# Patient Record
Sex: FEMALE | Race: WHITE | Employment: UNEMPLOYED | RURAL
[De-identification: names, ages, dates, MRNs, and addresses within clinical notes are randomized per-mention and may not be internally consistent; named-entity substitution may affect disease eponyms.]

---

## 2017-10-16 ENCOUNTER — HOSPITAL ENCOUNTER (OUTPATIENT)
Dept: PHYSICAL THERAPY | Age: 47
Discharge: HOME | End: 2017-10-31
Attending: NURSE PRACTITIONER | Admitting: NURSE PRACTITIONER

## 2017-10-16 DIAGNOSIS — M25.462 EFFUSION OF LEFT KNEE: ICD-10-CM

## 2017-10-16 ASSESSMENT — PAIN SCALES - GENERAL: PAINLEVEL_OUTOF10: 6

## 2017-10-16 ASSESSMENT — PAIN DESCRIPTION - LOCATION: LOCATION: KNEE

## 2017-10-16 ASSESSMENT — PAIN DESCRIPTION - DESCRIPTORS: DESCRIPTORS: PRESSURE

## 2017-10-16 ASSESSMENT — PAIN DESCRIPTION - ORIENTATION: ORIENTATION: LEFT

## 2017-10-16 NOTE — PROGRESS NOTES
Physical Therapy  Initial Assessment  Date: 10/16/2017  Patient Name: Karishma Lanza  MRN: [id number]  : 1970     Treatment Diagnosis: L knee pain; limited ROM, weakness    Restrictions  Restrictions/Precautions  Restrictions/Precautions:  (Previous hx of MI, so monitor for s/s)    Subjective   Chart Reviewed: Yes  Patient assessed for rehabilitation services?: Yes  Referring Practitioner: Murriel Sandifer, APRN  Referral Date : 10/06/17  Diagnosis: L knee pain  PT Insurance Information: [employer]    Subjective: Patient reports she has pain in L knee. She has had a long history of knee problems bilaterally with previous surgeries. About 3 weeks ago she had a pop in her L knee. She couldn't walk for 3 days immediately following. She had a knee brace that she wore for 1 and a half weeks. She then took it off so she wouldn't become dependent on it. She works on her feet at [employer]. She feels like when she moves a certain way she gets afraid it's \"going to go. \" Walking on concrete is difficult. She has trouble getting her leg up to don shoes and socks or LBD. Pain Assessment: 0-10  Pain Level: 6 (at worst)  Pain Location: Knee  Pain Orientation: Left  Pain Descriptors: Pressure  Patient Currently in Pain: No    Vision/Hearing   WNL    Orientation  Orientation  Overall Orientation Status: Within Normal Limits    Objective  Observation/Palpation  Palpation: Tender to palpation in medial and lateral joint line, patella tendon and lateral HSs.   Observation: mild limp with ambulation     PROM RLE (degrees)  RLE General PROM: limited  AROM RLE (degrees)  R Knee Flexion 0-145: 127 deg  R Knee Extension 0: lacking 7 degrees  PROM LLE (degrees)  LLE General PROM: limited  AROM LLE (degrees)  L Knee Flexion 0-145: 115 deg  L Knee Extension 0: lacking 7 degrees    Strength RLE  R Hip Flexion: 4+/5  R Hip Extension: 4/5  R Hip ABduction: 4/5  R Knee Flexion: 5/5  R Knee Extension: 5/5  R Ankle Short term goals: 3-4 weeks   Short term goal 1: Patient will improve her score on the LEFS from 29/80 to no less than 40/80 to demonstrate improved function. Short term goal 2: Patient will improve her L knee extension strength to 4-/5 to improve her long distance ambulation. Short term goal 3: Patient will improve her L hip abduction strength to 4-/5 to improve her ability to stand for prolonged times. Short term goal 4: Patient will report no greater than 3/10 pain at rest in L knee to improve the quality of her life. Short term goal 5: Patient will improve L knee flexion AROM to 120 degrees to improve her ability to don her shoes and socks. Long term goals  Long term goal 1: Patient will improve her score on the LEFS from 29/80 to no less than 50/80 to demonstrate improved function. Long term goal 2: Patient will improve her L knee extension strength to 4/5 to improve her long distance ambulation. Long term goal 3: Patient will improve her L hip abduction strength to 4/5 to improve her ability to stand for prolonged times. Long term goal 4: Patient will report no greater than 1/10 pain at rest in L knee to improve the quality of her life. Long term goal 5: Patient will improve L knee flexion AROM to 125 degrees to improve her ability to don her shoes and socks. Therapy Time   Individual Concurrent Group Co-treatment   Time In 1700         Time Out 1741         Minutes [address] [place] [address], [facility]  License Number [id number]    Certification of Medical Necessity: It will be understood that this treatment plan is certified medically necessary by the documenting therapist and referring physician mentioned in this report. Unless the physician indicated otherwise through written correspondence with our office, all further referrals will act as certification of medical necessity on this treatment plan.       Thank you for this referral.  If you have questions regarding this plan of care, please call [phone].           Revisions to this plan (optional):                     Please sign and return this plan to:   FAX: [phone]      Signature:                                 Date:

## 2017-10-18 ENCOUNTER — HOSPITAL ENCOUNTER (OUTPATIENT)
Dept: PHYSICAL THERAPY | Age: 47
Discharge: HOME | End: 2017-10-18
Admitting: NURSE PRACTITIONER

## 2017-10-18 DIAGNOSIS — M25.462 EFFUSION OF LEFT KNEE: ICD-10-CM

## 2017-10-23 ENCOUNTER — HOSPITAL ENCOUNTER (OUTPATIENT)
Dept: PHYSICAL THERAPY | Age: 47
Discharge: HOME | End: 2017-10-23
Admitting: NURSE PRACTITIONER

## 2017-10-23 DIAGNOSIS — M25.462 EFFUSION OF LEFT KNEE: ICD-10-CM

## 2017-10-23 NOTE — FLOWSHEET NOTE
Physical Therapy Daily Treatment Note   Date:  10/23/2017    TIme In:    1602                  Time Out:     3750    Patient Name:  Pritesh Weldon    :  1970  MRN: [id number]    Restrictions/Precautions:  Previous hx of MI, so monitor for s/s  Pertinent Medical History:  Medical/Treatment Diagnosis Information:   L knee pain   Limited ROM, weakness   Insurance/Certification information:   [employer]  Physician Information:   Phineas Quivers, APRN  Plan of care signed (Y/N):    Visit# / total visits:     2    Progress Note: []  Yes  [x]  No  Next due by: Visit #10 or 11/15/17      Pain level:  7.5/10  Subjective:  Pt reports she has a lot of popping and hurts all the time. This rainy weather seems to be worsening her symptoms. She reports she had a lot of pain with the roller last visit and was bruised from her knee to mid thigh. Objective:  Observation:   Test measurements:    Palpation:    Exercises:  Exercise Resistance/Repetitions Other comments   Standing hip abd 2x12 23   Sidestepping x3 laps 23   TKE 2x15   GTB 23   Heels Slides x30 23   Clams RTB x15 23   SLR in Supine 2x12 23   Quad Sets 5\"x20 23   Nustep 8' L4 23        Heel raises  Next visit    Glut sets Next visit    HS sets Next visit      Other Therapeutic Activities:      Manual Treatments:  PROM knee flex/ext, Light STM to anterior, posterior, and lateral knee    Modalities:        Timed Code Treatment Minutes:  53      Total Treatment Minutes:  63    Treatment/Activity Tolerance:  [x] Patient tolerated treatment well [] Patient limited by fatigue  [] Patient limited by pain  [] Patient limited by other medical complications  [x] Other: Pt had difficulty with manual therapy because of tenderness to touch along IT band, anterior knee, posterior knee, and in distal quad. She has a large amount of popping in her knee and repeatedly had to take a break from manual therapy to pop her knee because it would feel locked up.   She

## 2017-10-25 ENCOUNTER — HOSPITAL ENCOUNTER (OUTPATIENT)
Dept: PHYSICAL THERAPY | Age: 47
Discharge: HOME | End: 2017-10-25
Admitting: NURSE PRACTITIONER

## 2017-10-25 DIAGNOSIS — M25.462 EFFUSION OF LEFT KNEE: ICD-10-CM

## 2017-10-25 NOTE — FLOWSHEET NOTE
Physical Therapy Daily Treatment Note   Date:  10/25/2017    TIme In:    1550                 Time Out:     6433    Patient Name:  Marisel Garner    :  1970  MRN: [id number]    Restrictions/Precautions:  Previous hx of MI, so monitor for s/s  Pertinent Medical History:  Medical/Treatment Diagnosis Information:   L knee pain   Limited ROM, weakness   Insurance/Certification information:   [employer] [facility]  Physician Information:   MARK Cheney  Plan of care signed (Y/N):    Visit# / total visits:     3    Progress Note: []  Yes  [x]  No  Next due by: Visit #10 or 11/15/17      Pain level:  8/10  Subjective:  Pt reports having increased pain since last visit. Objective:  Observation:   Test measurements:    Palpation:    Exercises:  Exercise Resistance/Repetitions Other comments   Standing hip abd 2x12 25   Sidestepping x3 laps 25   TKE 2x15   GTB 25   Heels Slides x30 25   Clams RTB x15 25   SLR in Supine 2x12 25   Quad Sets 5\"x20 25   Nustep 8' L4 25        Heel raises  Next visit    Glut sets Next visit    HS sets Next visit      Other Therapeutic Activities:      Manual Treatments:  PROM knee flex/ext, Light STM to anterior, posterior, and lateral knee    Modalities:        Timed Code Treatment Minutes:  55      Total Treatment Minutes:  65    Treatment/Activity Tolerance:  [x] Patient tolerated treatment well [] Patient limited by fatigue  [] Patient limited by pain  [] Patient limited by other medical complications  [x] Other:   Pt tolerated tx well, decreased c/o pain/soreness after session.     Pain after treatment:      7/10    Prognosis: [x] Good [] Fair  [] Poor    Patient Requires Follow-up: [x] Yes  [] No    Plan:   [x] Continue per plan of care [] Alter current plan (see comments)  [] Plan of care initiated [] Hold pending MD visit [] Discharge    Plan for Next Session:        Electronically signed by:  Deborah Suh PTA,

## 2017-10-30 ENCOUNTER — HOSPITAL ENCOUNTER (OUTPATIENT)
Dept: PHYSICAL THERAPY | Age: 47
Discharge: HOME | End: 2017-10-30
Admitting: NURSE PRACTITIONER

## 2017-10-30 DIAGNOSIS — M25.462 EFFUSION OF LEFT KNEE: ICD-10-CM

## 2017-11-01 ENCOUNTER — HOSPITAL ENCOUNTER (OUTPATIENT)
Dept: PHYSICAL THERAPY | Age: 47
Discharge: HOME | End: 2017-11-01
Admitting: NURSE PRACTITIONER

## 2017-11-01 ENCOUNTER — HOSPITAL ENCOUNTER (OUTPATIENT)
Dept: OTHER | Age: 47
Discharge: HOME | End: 2017-11-30
Attending: NURSE PRACTITIONER | Admitting: NURSE PRACTITIONER

## 2017-11-01 DIAGNOSIS — M25.462 EFFUSION OF LEFT KNEE: ICD-10-CM

## 2017-11-06 ENCOUNTER — HOSPITAL ENCOUNTER (OUTPATIENT)
Dept: PHYSICAL THERAPY | Age: 47
Discharge: HOME | End: 2017-11-06
Admitting: NURSE PRACTITIONER

## 2017-11-06 DIAGNOSIS — M25.462 EFFUSION OF LEFT KNEE: ICD-10-CM

## 2017-12-01 ENCOUNTER — HOSPITAL ENCOUNTER (OUTPATIENT)
Dept: OTHER | Age: 47
Discharge: HOME | End: 2017-12-31
Attending: NURSE PRACTITIONER | Admitting: NURSE PRACTITIONER

## 2018-07-02 ENCOUNTER — HOSPITAL ENCOUNTER (OUTPATIENT)
Dept: PHYSICAL THERAPY | Age: 48
Discharge: HOME | End: 2018-07-05
Admitting: NURSE PRACTITIONER

## 2018-07-02 DIAGNOSIS — M25.462 EFFUSION OF LEFT KNEE: ICD-10-CM

## 2018-07-02 ASSESSMENT — PAIN SCALES - GENERAL: PAINLEVEL_OUTOF10: 5

## 2018-07-02 ASSESSMENT — PAIN DESCRIPTION - LOCATION: LOCATION: NECK;SHOULDER

## 2018-07-02 ASSESSMENT — PAIN DESCRIPTION - ORIENTATION: ORIENTATION: RIGHT

## 2018-07-02 ASSESSMENT — PAIN DESCRIPTION - PAIN TYPE: TYPE: ACUTE PAIN

## 2018-07-09 ENCOUNTER — HOSPITAL ENCOUNTER (OUTPATIENT)
Dept: PHYSICAL THERAPY | Age: 48
Discharge: HOME | End: 2018-07-12
Admitting: NURSE PRACTITIONER

## 2018-07-09 DIAGNOSIS — M25.462 EFFUSION OF LEFT KNEE: ICD-10-CM

## 2018-07-11 ENCOUNTER — HOSPITAL ENCOUNTER (OUTPATIENT)
Dept: PHYSICAL THERAPY | Age: 48
Discharge: HOME | End: 2018-07-14
Admitting: NURSE PRACTITIONER

## 2018-07-11 DIAGNOSIS — M25.462 EFFUSION OF LEFT KNEE: ICD-10-CM

## 2018-07-25 ENCOUNTER — HOSPITAL ENCOUNTER (OUTPATIENT)
Dept: PHYSICAL THERAPY | Facility: HOSPITAL | Age: 48
Setting detail: THERAPIES SERIES
Discharge: HOME | End: 2018-07-25
Payer: MEDICAID

## 2018-07-25 PROCEDURE — 97110 THERAPEUTIC EXERCISES: CPT

## 2018-07-25 PROCEDURE — 97140 MANUAL THERAPY 1/> REGIONS: CPT

## 2018-07-25 NOTE — FLOWSHEET NOTE
Physical Therapy Daily Treatment Note/Discharge Summary   Date:  2018    TIme In:   1600                   Time Out:      [id number]    Patient Name:  John Zazueta    :  1970  MRN: [id number]    Restrictions/Precautions:    Pertinent Medical History:  Medical/Treatment Diagnosis Information:  ·   cervical strain, shoulder pain     Insurance/Certification information:    [employer] medicaid  Physician Information:    MARK Ko  Plan of care signed (Y/N):    Visit# / total visits:     4/    G-Code (if applicable):      Date / Visit # G-Code Applied:         Progress Note: []  Yes  [x]  No  Next due by: Visit #10      Pain level:  0/10  Subjective: Pt reported she has been doing much better but still has increased difficulty with repetitive lifting at work. Objective:  Observation:   Test measurements: Quick DASH: 2%;  Cervical: flex: 0-50 deg, ext: 0-40 deg, R SB: 0-25 deg, L SB: 0-25 deg, R Rot: 0-48 deg, L Rot: 0-53  Palpation:    Exercises:  Exercise Resistance/Repetitions Other comments   UT Stretch 5x20\" 25   LS Stretch 5x20\" 25   Chin Tuck x10 25   Scap Sqeeze x20 25   Mid/Low Rows RTBx20 25   Shoulder ER/IR RTB x20 25   Wall Posture 5\"x10 25                              Other Therapeutic Activities:      Manual Treatments:   STM, gentle cxtx, OA release    Modalities:   MH / ES R shoulder / cervical region (not today)      Timed Code Treatment Minutes:  45       Total Treatment Minutes:  65    Treatment/Activity Tolerance:  [x] Patient tolerated treatment well [] Patient limited by fatigue  [] Patient limited by pain  [] Patient limited by other medical complications  [x] Other: Pt has made good progress since starting PT. Pt has achieved 4/4 STG's and 3/4 LTG's with pt still lacking B cervical rotation AROM. Pt has marked improvement with B cervical rotation but still falls short of goal.  Pt feels like she is ready to be discharged to [facility].   Pt provided with updated HEP and expressed good understanding. Pain after treatment:     0/10    Prognosis: [x] Good [] Fair  [] Poor    Patient Requires Follow-up: [] Yes  [x] No      Goals  Short term goals  Time Frame for Short term goals: 3 weeks  Short term goal 1: Pt to be I with HEP - met  Short term goal 2: Pt to demonstrate a 5 deg or greater increase in B cervical rotation AROM. - met   Short term goal 3: Pt to perform daily work activities with pain of less than 5/10 on average. - met  Short term goal 4: Pt to report a 50% reduction in R UE radicular symptoms. - met  Long term goals  Time Frame for Long term goals : 6-8 weeks  Long term goal 1: Quick DASH score to improve to 15% or less indicating improved function. - met. Long term goal 2: Pt to perform daily work activities with pain of less than 3/10. - met  Long term goal 3: Pt to have grade I or less tenderness to palpation at R scapular / cervical / shoulder musculature. - met   Long term goal 4: Pt to demonstrate B cervical rotation AROM of 0-60 deg.  - not met but improved    Plan:   [] Continue per plan of care [] Alter current plan (see comments)  [] Plan of care initiated [] Hold pending MD visit [x] Discharge    Plan for Next Session:        Electronically signed by:  Luis Trujillo PTA

## 2018-08-20 ENCOUNTER — HOSPITAL ENCOUNTER (EMERGENCY)
Facility: HOSPITAL | Age: 48
Discharge: HOME | End: 2018-08-21
Attending: EMERGENCY MEDICINE
Payer: MEDICAID

## 2018-08-20 DIAGNOSIS — S40.011A CONTUSION OF MULTIPLE SITES OF RIGHT SHOULDER AND UPPER ARM, INITIAL ENCOUNTER: Primary | ICD-10-CM

## 2018-08-20 DIAGNOSIS — S40.021A CONTUSION OF MULTIPLE SITES OF RIGHT SHOULDER AND UPPER ARM, INITIAL ENCOUNTER: Primary | ICD-10-CM

## 2018-08-20 PROCEDURE — 99283 EMERGENCY DEPT VISIT LOW MDM: CPT

## 2018-08-20 ASSESSMENT — PAIN SCALES - GENERAL: PAINLEVEL_OUTOF10: 8

## 2018-08-21 ENCOUNTER — APPOINTMENT (OUTPATIENT)
Dept: GENERAL RADIOLOGY | Facility: HOSPITAL | Age: 48
End: 2018-08-21
Payer: MEDICAID

## 2018-08-21 VITALS
DIASTOLIC BLOOD PRESSURE: 70 MMHG | HEART RATE: 86 BPM | BODY MASS INDEX: 35.99 KG/M2 | TEMPERATURE: 98.7 F | OXYGEN SATURATION: 97 % | SYSTOLIC BLOOD PRESSURE: 125 MMHG | WEIGHT: 216 LBS | RESPIRATION RATE: 16 BRPM | HEIGHT: 65 IN

## 2018-08-21 PROCEDURE — 73000 X-RAY EXAM OF COLLAR BONE: CPT

## 2018-08-21 PROCEDURE — 6370000000 HC RX 637 (ALT 250 FOR IP): Performed by: EMERGENCY MEDICINE

## 2018-08-21 PROCEDURE — 73030 X-RAY EXAM OF SHOULDER: CPT

## 2018-08-21 RX ORDER — TRAMADOL HYDROCHLORIDE 50 MG/1
50 TABLET ORAL ONCE
Status: COMPLETED | OUTPATIENT
Start: 2018-08-21 | End: 2018-08-21

## 2018-08-21 RX ADMIN — TRAMADOL HYDROCHLORIDE 50 MG: 50 TABLET, FILM COATED ORAL at 01:20

## 2018-08-21 NOTE — ED PROVIDER NOTES
[address] [place] [address] Court  eMERGENCY dEPARTMENT eNCOUnter      Pt Name: Leslie Tyler  MRN: [id number]  YOB: 1970  Date of evaluation: 2/17/6492  Provider: Priyanka Hennessy MD    CHIEF COMPLAINT       Chief Complaint   Patient presents with    Fall     Pt.fell,injured her right clavicle,shoulder,neck. HISTORY OF PRESENT ILLNESS  (Location/Symptom, Timing/Onset, Context/Setting, Quality, Duration, Modifying Factors, Severity.)   Wandy Vasquez is a 46 y.o. female who presents to the emergency department with right shoulder and clavicle pain after she fell and landed on her shoulder. This happened several hours ago but she is now unable to tolerate the pain. Nursing notes were reviewed. REVIEW OF SYSTEMS    (2-9 systems for level 4, 10 or more for level 5)   ROS:  General:  No fevers, no chills, no weakness  HEENT: No sore throat, runny nose or ear pain  Cardiovascular:  No chest pain, no palpitations  Respiratory:  No shortness of breath, no cough, no wheezing  Gastrointestinal:  No pain, no nausea, no vomiting, no diarrhea  Musculoskeletal:  Right shoulder and clavicle pain as above. No muscle pain, no joint pain  Skin:  No rash, no easy bruising  Genitourinary:  No dysuria, no hematuria    Except as noted above the remainder of the review of systems was reviewed and negative.        PAST MEDICAL HISTORY     Past Medical History:   Diagnosis Date    [facility]     CAD (coronary artery disease)     Diabetes mellitus ([facility]     Hypertension     Pneumonia          SURGICAL HISTORY       Past Surgical History:   Procedure Laterality Date    CORONARY ANGIOPLASTY WITH STENT PLACEMENT      HERNIA REPAIR      KNEE SURGERY           CURRENT MEDICATIONS       Previous Medications    ASPIRIN 325 MG EC TABLET    Take 325 mg by mouth daily    CARVEDILOL (COREG) 12.5 MG TABLET    Take 12.5 mg by mouth 2 times daily (with meals)    GABAPENTIN PO    Take by mouth LISINOPRIL (PRINIVIL;ZESTRIL) 20 MG TABLET    Take 20 mg by mouth daily    METFORMIN (GLUCOPHAGE) 500 MG TABLET    Take 500 mg by mouth 2 times daily (with meals)    SIMVASTATIN (ZOCOR) 10 MG TABLET    Take 10 mg by mouth nightly       ALLERGIES     Codeine; Dye [iodides]; and Sulfur    FAMILY HISTORY     No family history on file. SOCIAL HISTORY       Social History     Social History    Marital status:      Spouse name: N/A    Number of children: N/A    Years of education: N/A     Social History Main Topics    Smoking status: Current Every Day Smoker     Packs/day: 0.50     Types: Cigarettes    Smokeless tobacco: Never Used    Alcohol use No    Drug use: No    Sexual activity: Not on file     Other Topics Concern    Not on file     Social History Narrative    No narrative on file         PHYSICAL EXAM    (up to 7 for level 4, 8 or more for level 5)     ED Triage Vitals   BP Temp Temp src Pulse Resp SpO2 Height Weight   -- -- -- -- -- -- -- --       Physical Exam  General :Patient is awake, alert, oriented, in no acute distress, nontoxic appearing  HEENT: Pupils are equally round and reactive to light, EOMI. Cardiac: Heart regular rate, rhythm, no murmurs, rubs, or gallops  Lungs: Lungs are clear to auscultation, there is no wheezing, rhonchi, or rales. Abdomen: Abdomen is soft, nontender, nondistended. Musculoskeletal: Tenderness to palpation over the area of the medial right clavicle; there is some mild swelling present; patient has full abduction and abduction of the actual shoulder  Joint. Ambulatory  Back: No midline or bony tenderness. Dermatology: Skin is warm and dry  Psych: Mentation is grossly normal, cognition is grossly normal. Affect is appropriate.       DIAGNOSTIC RESULTS       RADIOLOGY:   Non-plain film images such as CT, Ultrasound and MRI are read by the radiologist. Plain radiographic images are visualized and preliminarily interpreted by the emergency physician are any questions or concerns please feel free to contact the dictating provider for clarification.     Doris Llanes MD  Attending Emergency Physician                  Doris Llanes MD  08/21/18 [id number]

## 2018-08-30 ENCOUNTER — HOSPITAL ENCOUNTER (OUTPATIENT)
Dept: GENERAL RADIOLOGY | Facility: HOSPITAL | Age: 48
Discharge: HOME | End: 2018-08-30
Payer: MEDICAID

## 2018-08-30 DIAGNOSIS — M89.8X0 EXOSTOSES, MULTIPLE: ICD-10-CM

## 2018-08-30 PROCEDURE — 73000 X-RAY EXAM OF COLLAR BONE: CPT

## 2018-08-31 ENCOUNTER — HOSPITAL ENCOUNTER (EMERGENCY)
Facility: HOSPITAL | Age: 48
Discharge: HOME | End: 2018-08-31
Attending: EMERGENCY MEDICINE | Admitting: EMERGENCY MEDICINE

## 2018-08-31 ENCOUNTER — HOSPITAL ENCOUNTER (OUTPATIENT)
Dept: CT IMAGING | Facility: HOSPITAL | Age: 48
Discharge: HOME | End: 2018-08-31
Payer: MEDICAID

## 2018-08-31 ENCOUNTER — HOSPITAL ENCOUNTER (OUTPATIENT)
Facility: HOSPITAL | Age: 48
Discharge: HOME | End: 2018-08-31

## 2018-08-31 ENCOUNTER — HOSPITAL ENCOUNTER (EMERGENCY)
Facility: HOSPITAL | Age: 48
End: 2018-08-31
Payer: MEDICAID

## 2018-08-31 ENCOUNTER — HOSPITAL ENCOUNTER (OUTPATIENT)
Facility: HOSPITAL | Age: 48
End: 2018-08-31
Payer: MEDICAID

## 2018-08-31 VITALS
HEART RATE: 88 BPM | HEIGHT: 65 IN | SYSTOLIC BLOOD PRESSURE: 147 MMHG | TEMPERATURE: 98.3 F | OXYGEN SATURATION: 98 % | BODY MASS INDEX: 39.32 KG/M2 | RESPIRATION RATE: 20 BRPM | WEIGHT: 236 LBS | DIASTOLIC BLOOD PRESSURE: 78 MMHG

## 2018-08-31 DIAGNOSIS — N83.8 OVARIAN MASS, RIGHT: Primary | ICD-10-CM

## 2018-08-31 DIAGNOSIS — R52 PAIN: ICD-10-CM

## 2018-08-31 LAB
ALBUMIN SERPL-MCNC: 4.2 G/DL (ref 3.5–5)
ALBUMIN/GLOB SERPL: 1.4 G/DL (ref 1–2)
ALP SERPL-CCNC: 71 U/L (ref 38–126)
ALT SERPL W P-5'-P-CCNC: 39 U/L (ref 13–69)
ANION GAP SERPL CALCULATED.3IONS-SCNC: 12.3 MMOL/L (ref 10–20)
AST SERPL-CCNC: 36 U/L (ref 15–46)
BASOPHILS # BLD AUTO: 0.09 10*3/MM3 (ref 0–0.2)
BASOPHILS NFR BLD AUTO: 0.8 % (ref 0–2.5)
BILIRUB SERPL-MCNC: 0.3 MG/DL (ref 0.2–1.3)
BUN BLD-MCNC: 14 MG/DL (ref 7–20)
BUN/CREAT SERPL: 23.3 (ref 7.1–23.5)
CALCIUM SPEC-SCNC: 9.1 MG/DL (ref 8.4–10.2)
CHLORIDE SERPL-SCNC: 102 MMOL/L (ref 98–107)
CO2 SERPL-SCNC: 29 MMOL/L (ref 26–30)
CREAT BLD-MCNC: 0.6 MG/DL (ref 0.6–1.3)
DEPRECATED RDW RBC AUTO: 39.8 FL (ref 37–54)
EOSINOPHIL # BLD AUTO: 0.21 10*3/MM3 (ref 0–0.7)
EOSINOPHIL NFR BLD AUTO: 1.9 % (ref 0–7)
ERYTHROCYTE [DISTWIDTH] IN BLOOD BY AUTOMATED COUNT: 12.5 % (ref 11.5–14.5)
GFR SERPL CREATININE-BSD FRML MDRD: 106 ML/MIN/1.73
GFR SERPL CREATININE-BSD FRML MDRD: 128 ML/MIN/1.73
GLOBULIN UR ELPH-MCNC: 2.9 GM/DL
GLUCOSE BLD-MCNC: 186 MG/DL (ref 74–98)
HCT VFR BLD AUTO: 40.6 % (ref 37–47)
HGB BLD-MCNC: 13.3 G/DL (ref 12–16)
IMM GRANULOCYTES # BLD: 0.03 10*3/MM3 (ref 0–0.06)
IMM GRANULOCYTES NFR BLD: 0.3 % (ref 0–0.6)
LYMPHOCYTES # BLD AUTO: 2.24 10*3/MM3 (ref 0.6–3.4)
LYMPHOCYTES NFR BLD AUTO: 19.7 % (ref 10–50)
MCH RBC QN AUTO: 29 PG (ref 27–31)
MCHC RBC AUTO-ENTMCNC: 32.8 G/DL (ref 30–37)
MCV RBC AUTO: 88.5 FL (ref 81–99)
MONOCYTES # BLD AUTO: 1.03 10*3/MM3 (ref 0–0.9)
MONOCYTES NFR BLD AUTO: 9.1 % (ref 0–12)
NEUTROPHILS # BLD AUTO: 7.75 10*3/MM3 (ref 2–6.9)
NEUTROPHILS NFR BLD AUTO: 68.2 % (ref 37–80)
NRBC BLD MANUAL-RTO: 0 /100 WBC (ref 0–0)
PLATELET # BLD AUTO: 257 10*3/MM3 (ref 130–400)
PMV BLD AUTO: 10 FL (ref 6–12)
POTASSIUM BLD-SCNC: 4.3 MMOL/L (ref 3.5–5.1)
PROT SERPL-MCNC: 7.1 G/DL (ref 6.3–8.2)
RBC # BLD AUTO: 4.59 10*6/MM3 (ref 4.2–5.4)
SODIUM BLD-SCNC: 139 MMOL/L (ref 137–145)
WBC NRBC COR # BLD: 11.35 10*3/MM3 (ref 4.8–10.8)

## 2018-08-31 PROCEDURE — 36415 COLL VENOUS BLD VENIPUNCTURE: CPT

## 2018-08-31 PROCEDURE — 99283 EMERGENCY DEPT VISIT LOW MDM: CPT

## 2018-08-31 PROCEDURE — 80053 COMPREHEN METABOLIC PANEL: CPT | Performed by: NURSE PRACTITIONER

## 2018-08-31 PROCEDURE — 4500000002 HC ER NO CHARGE

## 2018-08-31 PROCEDURE — 82378 CARCINOEMBRYONIC ANTIGEN: CPT | Performed by: NURSE PRACTITIONER

## 2018-08-31 PROCEDURE — 86304 IMMUNOASSAY TUMOR CA 125: CPT | Performed by: NURSE PRACTITIONER

## 2018-08-31 PROCEDURE — 74176 CT ABD & PELVIS W/O CONTRAST: CPT

## 2018-08-31 PROCEDURE — 85025 COMPLETE CBC W/AUTO DIFF WBC: CPT | Performed by: NURSE PRACTITIONER

## 2018-08-31 PROCEDURE — 86301 IMMUNOASSAY TUMOR CA 19-9: CPT | Performed by: NURSE PRACTITIONER

## 2018-08-31 RX ORDER — ASPIRIN 81 MG/1
81 TABLET, CHEWABLE ORAL DAILY
COMMUNITY

## 2018-08-31 RX ORDER — CLOPIDOGREL BISULFATE 75 MG/1
75 TABLET ORAL DAILY
COMMUNITY

## 2018-08-31 RX ORDER — SERTRALINE HYDROCHLORIDE 25 MG/1
50 TABLET, FILM COATED ORAL DAILY
COMMUNITY
End: 2022-03-15 | Stop reason: SDUPTHER

## 2018-08-31 RX ORDER — LISINOPRIL 20 MG/1
20 TABLET ORAL DAILY
COMMUNITY
End: 2021-09-01

## 2018-08-31 RX ORDER — ATORVASTATIN CALCIUM 10 MG/1
80 TABLET, FILM COATED ORAL DAILY
COMMUNITY
End: 2022-03-15 | Stop reason: SDUPTHER

## 2018-08-31 RX ORDER — GABAPENTIN 300 MG/1
400 CAPSULE ORAL 3 TIMES DAILY
COMMUNITY
End: 2021-04-19

## 2018-09-01 NOTE — ED PROVIDER NOTES
Subjective   History of Present Illness  This is a 48-year-old female who comes in today stating she had a outpatient CT scan done today at [facility] and was contacted by her primary care provider and told to come to the emergency department because she had a 20 cm x 30 cm mass on her ovary.  She reports that she's been having pain intermittently for the past month has progressively gotten worse until she went to see her primary care on Tuesday and they ordered the CT scan to be done today.  She denies any worsening pain but does state that it feels uncomfortable and has for the past month.  She denies any difficulty urinating fever or chills nausea or vomiting.  Review of Systems   Constitutional: Negative.    HENT: Negative.    Eyes: Negative.    Respiratory: Negative.    Cardiovascular: Negative.    Gastrointestinal: Negative.    Endocrine: Negative.    Genitourinary: Positive for menstrual problem and pelvic pain.   Musculoskeletal: Negative.    Skin: Negative.    Allergic/Immunologic: Negative.    Neurological: Negative.    Psychiatric/Behavioral: Negative.    All other systems reviewed and are negative.      Past Medical History:   Diagnosis Date   • Diabetes mellitus (CMS/HCC)    • Hyperlipidemia    • Hypertension    • Myocardial infarction    • Neuropathy        Allergies   Allergen Reactions   • Codeine Nausea And Vomiting   • Sulfa Antibiotics Nausea And Vomiting   • Contrast Dye Rash       Past Surgical History:   Procedure Laterality Date   • HERNIA REPAIR     • KNEE SURGERY         History reviewed. No pertinent family history.    Social History     Social History   • Marital status:      Social History Main Topics   • Smoking status: Current Every Day Smoker     Packs/day: 1.00     Types: Cigarettes   • Alcohol use No   • Drug use: No     Other Topics Concern   • Not on file           Objective   Physical Exam   Constitutional: She appears well-developed and well-nourished.    Nursing note and vitals reviewed.  GEN: No acute distress  Head: Normocephalic, atraumatic  Eyes: Pupils equal round reactive to light  ENT: Posterior pharynx normal in appearance, oral mucosa is moist  Chest: Nontender to palpation  Cardiovascular: Regular rate  Lungs: Clear to auscultation bilaterally  Abdomen: Soft, nontender, nondistended, no peritoneal signs  Extremities: No edema, normal appearance  Neuro: GCS 15  Psych: Mood and affect are appropriate      Procedures           ED Course    called to Miki Bryson to obtain results of CT scan done today.  Was informed that the CT had not been read yet.  Shortly after received a call from the radiologist  who verbally gave me the reading.  He reports that the patient has a large 30 cm x 20 cm solid right cystic ovarian mass which looks as though has been there for some time.  He reports that emergent follow up was not necessary she has no lymphadenopathy to any of the surrounding tissue.  He recommended a gynecology referral for evaluation of this cystic mass.  Called to Dr. GEN Gonzalez on-call OB/GYN and discussed case with him.  He reports that no further testing needed to be done today that she needs to be followed up with gynecology oncology who can perform removal of this large mass.  Given the patient's history of having this pain for over a month and she has no severe onset of pain no nausea or vomiting and it's unlikely that this mass is torsion.  So we will go ahead and draw labs for the gynecology oncology department to review and we will discharge her home with strict return to care instructions.  I've advised her any increase in pain nausea or vomiting she's to report back to the emergency department.  Dr. Gonzalez will also follow-up on this patient as well to see that she gets to the oncology gynecology department.              MDM      Final diagnoses:   Ovarian mass, right            Lange, Nicci COE, APRN  08/31/18 [id number]

## 2018-09-01 NOTE — ED NOTES
Jeana Loo, RN from [facility] called to say that the Radiologist would like to call and speak with ARETHA Herring about patient's CT report. ARETHA Santiago notified.       Nancy Dorantes  08/31/18 2136

## 2018-09-01 NOTE — ED NOTES
Spoke with Jeana Loo, RN from [facility] who stated that the Radiologist had not read CT yet and they had contacted him and would send CT report as soon as it had been read. ARETHA Santiago notified.     Nancy Dorantes  08/31/18 2121

## 2018-09-01 NOTE — ED NOTES
Medical Fax release sent to Analilia Bryson to obtain medical records on patient.     Nancy Dorantes  08/31/18 [id number]

## 2018-09-02 LAB
CANCER AG125 SERPL-ACNC: 32 U/ML (ref 0–38.1)
CANCER AG19-9 SERPL-ACNC: 8 U/ML (ref 0–35)

## 2018-09-04 ENCOUNTER — OFFICE VISIT (OUTPATIENT)
Dept: OBSTETRICS AND GYNECOLOGY | Facility: CLINIC | Age: 48
End: 2018-09-04

## 2018-09-04 VITALS
SYSTOLIC BLOOD PRESSURE: 166 MMHG | DIASTOLIC BLOOD PRESSURE: 90 MMHG | HEIGHT: 65 IN | WEIGHT: 236 LBS | BODY MASS INDEX: 39.32 KG/M2

## 2018-09-04 DIAGNOSIS — N83.8 OVARIAN MASS: Primary | ICD-10-CM

## 2018-09-04 LAB
CEA SERPL-MCNC: 5.9 NG/ML (ref 0–4.7)
SPECIMEN STATUS: NORMAL

## 2018-09-04 PROCEDURE — 99204 OFFICE O/P NEW MOD 45 MIN: CPT | Performed by: OBSTETRICS & GYNECOLOGY

## 2018-09-04 RX ORDER — CARVEDILOL 6.25 MG/1
6.25 TABLET ORAL 2 TIMES DAILY WITH MEALS
COMMUNITY
Start: 2018-09-04 | End: 2021-04-19 | Stop reason: DRUGHIGH

## 2018-09-04 RX ORDER — CYCLOBENZAPRINE HCL 10 MG
10 TABLET ORAL 3 TIMES DAILY PRN
COMMUNITY
Start: 2018-09-04 | End: 2021-09-01

## 2018-09-04 NOTE — PROGRESS NOTES
Subjective   Chief Complaint   Patient presents with   • Ovarian Mass     Hector Nugent is a 48 y.o. year old .  Patient's last menstrual period was 2018.  She presents to be seen because of 30cm ovarian complex mass noted on CT and confirmed on TVs today.  normal CEA marginally elevated. MIx 2, stents placed, on plavix  .   PAtient states started having difficulty moving bowels and this with abd discomfort led her to ED and PCP with subsequent imaging  Revealing above.   OTHER COMPLAINTS:  Nothing else    The following portions of the patient's history were reviewed and updated as appropriate:  She  has a past medical history of Diabetes mellitus (CMS/[facility]); Hyperlipidemia; Hypertension; Myocardial infarction; and Neuropathy.  She  does not have a problem list on file.  She  has a past surgical history that includes Hernia repair and Knee surgery.  Her family history is not on file.  She  reports that she has been smoking Cigarettes.  She has been smoking about 1.00 pack per day. She has never used smokeless tobacco. She reports that she does not drink alcohol or use drugs.  Current Outpatient Prescriptions   Medication Sig Dispense Refill   • aspirin 81 MG chewable tablet Chew 81 mg Daily.     • atorvastatin (LIPITOR) 10 MG tablet Take 10 mg by mouth Daily.     • carvedilol (COREG) 6.25 MG tablet      • clopidogrel (PLAVIX) 75 MG tablet Take 75 mg by mouth Daily.     • cyclobenzaprine (FLEXERIL) 10 MG tablet      • gabapentin (NEURONTIN) 300 MG capsule Take 300 mg by mouth 3 (Three) Times a Day.     • lisinopril (PRINIVIL,ZESTRIL) 20 MG tablet Take 20 mg by mouth Daily.     • metFORMIN (GLUCOPHAGE) 500 MG tablet Take 500 mg by mouth 2 (Two) Times a Day With Meals.     • ONE TOUCH ULTRA TEST test strip      • sertraline (ZOLOFT) 25 MG tablet Take 25 mg by mouth Daily.       No current facility-administered medications for this visit.      Current Outpatient Prescriptions on File Prior to Visit  "  Medication Sig   • aspirin 81 MG chewable tablet Chew 81 mg Daily.   • atorvastatin (LIPITOR) 10 MG tablet Take 10 mg by mouth Daily.   • clopidogrel (PLAVIX) 75 MG tablet Take 75 mg by mouth Daily.   • gabapentin (NEURONTIN) 300 MG capsule Take 300 mg by mouth 3 (Three) Times a Day.   • lisinopril (PRINIVIL,ZESTRIL) 20 MG tablet Take 20 mg by mouth Daily.   • metFORMIN (GLUCOPHAGE) 500 MG tablet Take 500 mg by mouth 2 (Two) Times a Day With Meals.   • sertraline (ZOLOFT) 25 MG tablet Take 25 mg by mouth Daily.     No current facility-administered medications on file prior to visit.      She is allergic to codeine; sulfa antibiotics; and contrast dye.    Smoking status: Current Every Day Smoker                                                   Packs/day: 1.00      Years: 0.00         Types: Cigarettes  Smokeless tobacco: Never Used                        Review of Systems  Consitutional POS: fatigue    NEG: anorexia or night sweats   Gastointestinal POS: constipation (new onset)    NEG: diarrhea or melena   Genitourinary POS: nothing reported    NEG: dysuria or hematuria   Integument POS: nothing reported    NEG: moles that are changing in size, shape, color or rashes   Breast POS: nothing reported    NEG: persistent breast lump, skin dimpling or nipple discharge         Constitutional: positive for fatigue  Eyes: negative  Ears, nose, mouth, throat, and face: negative  Respiratory: negative  Cardiovascular: negative  Hematologic/lymphatic: negative  Musculoskeletal:negative  Neurological: negative  Behavioral/Psych: negative  Endocrine: negative  Allergic/Immunologic: negative          Objective   /90   Ht 165.1 cm (65\")   Wt 107 kg (236 lb)   LMP 08/23/2018   BMI 39.27 kg/m²     General:  well developed; well nourished  no acute distress   Skin:  No suspicious lesions seen   Thyroid: normal to inspection and palpation   Lungs:  breathing is unlabored  clear to auscultation bilaterally   Heart:  regular " rate and rhythm, S1, S2 normal, no murmur, click, rub or gallop   Breasts:  Not performed.   Abdomen: grossly distended, +BS, mildly TTP   Pelvis: Not performed.     Psychiatric: Alert and oriented ×3, mood and affect appropriate  HEENT: Atraumatic, normocephalic, normal scleral icterus  Extremities: 2+ pulses bilaterally, no edema      Lab Review   as above    Imaging   CT of abdomen/pelvis report  Pelvic ultrasound images independantly reviewed - grossly enlarged cystic mass with solid and fluid components.        Assessment   1. Enlarged 30cm complex adnexal mass     Plan   1. Referral GYN ONC  2.  Off work until post op    No orders of the defined types were placed in this encounter.         This note was electronically signed.      September 4, 2018

## 2018-09-10 ENCOUNTER — OFFICE VISIT (OUTPATIENT)
Dept: GYNECOLOGIC ONCOLOGY | Facility: CLINIC | Age: 48
End: 2018-09-10

## 2018-09-10 VITALS
DIASTOLIC BLOOD PRESSURE: 89 MMHG | RESPIRATION RATE: 16 BRPM | HEIGHT: 65 IN | TEMPERATURE: 98.2 F | OXYGEN SATURATION: 98 % | WEIGHT: 238 LBS | SYSTOLIC BLOOD PRESSURE: 179 MMHG | BODY MASS INDEX: 39.65 KG/M2 | HEART RATE: 102 BPM

## 2018-09-10 DIAGNOSIS — R10.84 GENERALIZED ABDOMINAL PAIN: ICD-10-CM

## 2018-09-10 DIAGNOSIS — R19.07 GENERALIZED ABDOMINAL MASS: Primary | ICD-10-CM

## 2018-09-10 DIAGNOSIS — E66.01 SEVERE OBESITY (BMI 35.0-39.9) WITH COMORBIDITY (HCC): ICD-10-CM

## 2018-09-10 DIAGNOSIS — I25.2 H/O ACUTE MYOCARDIAL INFARCTION: ICD-10-CM

## 2018-09-10 DIAGNOSIS — Z72.0 TOBACCO ABUSE: ICD-10-CM

## 2018-09-10 DIAGNOSIS — E11.8 TYPE 2 DIABETES MELLITUS WITH COMPLICATION, WITHOUT LONG-TERM CURRENT USE OF INSULIN (HCC): ICD-10-CM

## 2018-09-10 LAB — REF LAB TEST METHOD: NORMAL

## 2018-09-10 PROCEDURE — 99205 OFFICE O/P NEW HI 60 MIN: CPT | Performed by: OBSTETRICS & GYNECOLOGY

## 2018-09-10 RX ORDER — OXYCODONE HYDROCHLORIDE 5 MG/1
TABLET ORAL
Qty: 20 TABLET | Refills: 0 | Status: SHIPPED | OUTPATIENT
Start: 2018-09-10 | End: 2018-09-25 | Stop reason: HOSPADM

## 2018-09-10 NOTE — PROGRESS NOTES
"Hector Nugent  [id number]  1970      Reason for visit:  Ovarian Mass     Consultation:  Patient is being seen at the request of Dr. Martinez    History of present illness:  The patient is a 48 y.o. year old female who presents today for treatment and evaluation of the above issues.    Patient reports noticing abdominal pain for last 2 months. She states she initially attributed pain to constipation however the pain worsened so she presented to her PCP who ordered CT scan showing Large 30 cm solid and cystic (predominantly cystic) right ovarian mass concerning for mucinous or serous cystadenoma. Patient reports the pain is constant and has episodes of stabbing pain bilaterally. She has been taking percocet 5 x2 and continues to have pain.  She requests additional narcotics today as she is out of medication.  Patient reports worsening distention the last 2 months however states she has had distention since her  23 years ago. She also reports stress incontinence since her  and reports \"bad \" laceration at time of delivery. Patient denies CP, sob, fever, chills, weight loss.  She notes worsening urinary frequency, but normal appetite and bowel function.   She brings CT disk a/p/c and CT disk of c-spine and head to today's visit.    For new patients, [facility] intake form was reviewed and confirmed today. Patient reports abnormal uterine bleeding at baseline with irregular periods still. She does have complaints of hot flashes intermittently.     Her comorbidities are remarkable for long-standing tobacco abuse with 32-pack-year history, coronary artery disease with 3 prior myocardial infarctions and stent placement, and diabetes mellitus with this hemoglobin A1c of 7.1.  She does not check her fingersticks from what I can tell.  She has taken herself off all of her regular medications except for aspirin in preparation for today's visit ×6 days.     OBGYN History:  She is a .  She does not use HRT. She does " not have a history of abnormal pap smears. Reports last pap 20017 and mammogram 2017 wnl.       Oncologic History:   No history exists.         Past Medical History:   Diagnosis Date   • CAD (coronary artery disease)    • Diabetes mellitus (CMS/HCC)    • Hyperlipidemia    • Hypertension    • Myocardial infarction    • Neuropathy        Past Surgical History:   Procedure Laterality Date   • CORONARY ANGIOPLASTY WITH STENT PLACEMENT     • HERNIA REPAIR     • KNEE SURGERY         MEDICATIONS: The current medication list was reviewed with the patient and updated in the EMR this date per the Medical Assistant. Medication dosages and frequencies were confirmed to be accurate.      Allergies:  is allergic to codeine; sulfa antibiotics; and contrast dye.    Social History:   Social History     Social History   • Marital status:      Spouse name: N/A   • Number of children: N/A   • Years of education: N/A     Occupational History   • Not on file.     Social History Main Topics   • Smoking status: Current Every Day Smoker     Packs/day: 1.00     Types: Cigarettes   • Smokeless tobacco: Never Used   • Alcohol use No   • Drug use: No   • Sexual activity: Yes     Partners: Male     Birth control/ protection: None     Other Topics Concern   • Not on file     Social History Narrative   • No narrative on file       Family History:  History reviewed. No pertinent family history.    Health Maintenance:    Health Maintenance   Topic Date Due   • ANNUAL PHYSICAL  08/15/1971   • PNEUMOCOCCAL VACCINE (19-64 MEDIUM RISK) (1 of 1 - PPSV23) 08/15/1987   • TDAP/TD VACCINES (1 - Tdap) 08/15/1987   • PAP SMEAR  12/19/2017   • INFLUENZA VACCINE  08/01/2018   • ZOSTER VACCINE (1 of 2) 08/15/2018   • COLONOSCOPY  08/31/2018   • MAMMOGRAM  09/04/2018   • LIPID PANEL  09/04/2018       Review of Systems   Constitutional: Positive for fatigue. Negative for activity change, appetite change, chills, fever and unexpected weight change.   HENT:  "Negative for congestion, hearing loss, postnasal drip and sore throat.    Eyes: Negative for photophobia, pain and redness. Visual disturbance: corrective lenses.   Respiratory: Negative for cough, chest tightness, shortness of breath and wheezing.    Cardiovascular: Negative for chest pain, palpitations and leg swelling.   Gastrointestinal: Positive for abdominal distention, abdominal pain and constipation. Negative for blood in stool, diarrhea, nausea and vomiting.        Change in stool consistency   Endocrine: Positive for heat intolerance. Negative for cold intolerance and polyuria (hot flashes).   Genitourinary: Positive for enuresis and frequency. Negative for difficulty urinating, dyspareunia, dysuria, genital sores, hematuria, pelvic pain, urgency, vaginal bleeding, vaginal discharge and vaginal pain.   Musculoskeletal: Positive for myalgias. Negative for back pain, gait problem, joint swelling and neck stiffness.   Skin: Positive for wound (multiple insect bites). Negative for color change, pallor and rash.   Allergic/Immunologic: Negative for environmental allergies, food allergies and immunocompromised state.   Neurological: Positive for dizziness (vertigo). Negative for seizures, syncope, speech difficulty, weakness, light-headedness, numbness and headaches.   Hematological: Negative for adenopathy. Does not bruise/bleed easily.   Psychiatric/Behavioral: Negative for behavioral problems, confusion, dysphoric mood, hallucinations and sleep disturbance. The patient is nervous/anxious.        Physical Exam    Vitals:    09/10/18 1457   BP: 179/89   Pulse: 102   Resp: 16   Temp: 98.2 °F (36.8 °C)   TempSrc: Temporal Artery    SpO2: 98%   Weight: 108 kg (238 lb)   Height: 165.1 cm (65\")     Body mass index is 39.61 kg/m².    Wt Readings from Last 3 Encounters:   09/10/18 108 kg (238 lb)   09/04/18 107 kg (236 lb)   08/31/18 107 kg (236 lb)         GENERAL: Alert, well-appearing female appearing her stated " age who is in no apparent distress.  Patient is obese.  HEENT: Sclera anicteric. Head normocephalic, atraumatic. Mucus membranes moist.   NECK: Trachea midline, supple, without masses.  No thyromegaly.   BREASTS: Deferred  CARDIOVASCULAR: Normal rate, regular rhythm, no murmurs, rubs, or gallops. Trace bilateral peripheral edema.  RESPIRATORY: Clear to auscultation bilaterally, normal respiratory effort  BACK:  No CVA tenderness, no vertebral tenderness on palpation  GASTROINTESTINAL:  Abdomen is taught due to mass effect , tender periumbilically and diffusely, largely distended, no rebound or guarding.  SKIN:  Warm, dry, well-perfused.  All visible areas intact.  No rashes, ulcers.  Multiple insect bites - extremities and abdomen  PSYCHIATRIC: AO x3, with appropriate affect, normal thought processes.  NEUROLOGIC: No focal deficits.  Moves extremities well.  MUSCULOSKELETAL: Normal gait and station.   EXTREMITIES:   No cyanosis, clubbing, symmetric.  LYMPHATICS:  No cervical or inguinal adenopathy noted.     PELVIC exam:    GYNECOLOGIC:  External genitalia are free from lesion. On speculum examination, the cervix was free from lesion. On bimanual examination large mass appreciated, pulled out of pelvis without posterior cul de sac nodularity or pelvic fullness otherwise.  Uterus was normal in size and shape. There is no cervical motion or uterine tenderness. No cervical mass was palpated. Parametria were smooth. Rectovaginal exam was deferred.     ECOG PS 0    PROCEDURES:  TVUS:     Diagnostic Data:      Report from CT scan of abdomen and pelvis without IV contrast 8/31/2018 showed 30 cm solid and cystic mass likely representing an ovarian neoplasm which was predominantly cystic.    On Dr. Mims's review of the images, this was mainly septic with septations at the base and a focally solid area.  There is no ascites nodularity or lymphadenopathy suggestive of a cancerous process.  Uterus was prominent for age, but  no other gynecologic abnormality was appreciated.  No hydronephrosis or suspicious findings of solid organs were identified.    Report from CT scan head without contrast 5/23/2018 was negative for acute intracranial hemorrhage or mass effect.  CT scan of C-spine the same date showed no acute traumatic abnormality but was remarkable for cervical spondylosis.    Report from CT scan of chest 5/23/2018 showed no acute abnormality was remarkable for coronary artery disease.      Lab Results   Component Value Date    WBC 11.35 (H) 08/31/2018    HGB 13.3 08/31/2018    HCT 40.6 08/31/2018    MCV 88.5 08/31/2018     08/31/2018    NEUTROABS 7.75 (H) 08/31/2018    GLUCOSE 186 (H) 08/31/2018    BUN 14 08/31/2018    CREATININE 0.60 08/31/2018    EGFRIFNONA 106 08/31/2018    EGFRIFAFRI 128 08/31/2018     08/31/2018    K 4.3 08/31/2018     08/31/2018    CO2 29.0 08/31/2018    CALCIUM 9.1 08/31/2018    ALBUMIN 4.20 08/31/2018    AST 36 08/31/2018    ALT 39 08/31/2018    BILITOT 0.3 08/31/2018     Lab Results   Component Value Date     32.0 08/31/2018           Assessment/Plan   This is a 48 y.o. woman with Large right sided ovarian mass and abdominal pain.      Ovarian mass  -CT scan 8/31/18: 30 cm solid and cystic (predominantly cystic)  Right ovarian mass concerning for mucinous or serous cystadenoma. Imaging reviewed with patient and findings discussed with patient.  -TVUS 9/4/18: 27 cm plus complex abdominal mass.  Overall etiology uncertain.  Cystic and solid components noted.  Encompasses the entire abdomen.   -Discussed with patient likely pathology and treatment. All of patients questions answered.   -Patient was consented for Total Abdominal Hysterectomy, Bilateral Salpingo-oophorectomy, possible cancer staging.  Frozen section to be performed at the time of surgery.  -Risks and benefits of surgery were discussed.  This included, but was not limited to, infection and bleeding like when the skin is  cut; damage to surrounding structures; and incisional complications.  Risk of DVT was addressed for major surgeries.  Standard of care efforts to minimize these risks were reviewed.  Typical hospital stay and recovery were discussed as well as post-procedure precautions.  Surgical implications of chronic illnesses on recovery and surgical outcome were reviewed.   -Risks and benefits of possible debulking were further discussed.  This included lymphocyst, hematoma, lymphedema, vascular injury, and nerve injury, bowel perferation.  -Patient verbalized understanding of the plan including the risks and benefits.  Appropriate perioperative testing including laboratory evaluation, EKG as clinically indicated, chest x-ray as clinically indicated, and preadmission evaluation were all ordered as a part of this patient's care.  - 8/31: 32  -Patient was reassured regarding her diagnosis and that this is likely a benign process.    CAD  -H/O 3 previous stent placements, MIs  -Patient reports she has been off of her heart medication including carvedilol and Plavix for 6 days prior to this appointment to avoid postponing surgery  -Continue home medication now  -Patient to be seen for preoperative clearance by Dr. Car; appointment 9/11    HTN  -stable when taking home medications  -restart home medications    DM II  -Restart home medications   --SSI post op     Obesity with BMI 39  -Some of this is likely related to patient's mass  -However obesity complicates all aspects of care and I don't think that entire BMI is related to the large pelvic abdominal mass     Tobacco abuse  -Scheduled nebulized treatment postoperative  -Likely nicotine patch    Abdominal pain and request for narcotics  -Patient was given a prescription for oxycodone 5 dispense 20  -She was advised that this is a large number of pills and she should not need any more prior to surgery  -Will check urine drug screen preoperatively in keeping with practice  guidelines    FOLLOW UP:  Surgery    Patient was seen and examined with Dr. Villarreal,  resident, who performed portions of the examination and documentation for this patient's care under my direct supervision.  I agree with the above documentation and plan.    I personally spent 60 minutes face-to-face with the patient of which >50% was spent performing counseling/coordiantion of care.    Note: Speech recognition transcription software was used to dictate portions of this document.  An attempt at proofreading has been made though minor errors in transcription may still be present.  Please do not hesitate to call our office with any questions.    Kianna Mims MD  09/10/18  5:07 PM

## 2018-09-11 ENCOUNTER — PREP FOR SURGERY (OUTPATIENT)
Dept: GYNECOLOGIC ONCOLOGY | Facility: CLINIC | Age: 48
End: 2018-09-11

## 2018-09-11 DIAGNOSIS — R19.07 GENERALIZED ABDOMINAL MASS: Primary | ICD-10-CM

## 2018-09-11 RX ORDER — CELECOXIB 100 MG/1
200 CAPSULE ORAL ONCE
Status: CANCELLED | OUTPATIENT
Start: 2018-09-11

## 2018-09-11 RX ORDER — PREGABALIN 25 MG/1
150 CAPSULE ORAL ONCE
Status: CANCELLED | OUTPATIENT
Start: 2018-09-11

## 2018-09-11 RX ORDER — SODIUM CHLORIDE, SODIUM LACTATE, POTASSIUM CHLORIDE, CALCIUM CHLORIDE 600; 310; 30; 20 MG/100ML; MG/100ML; MG/100ML; MG/100ML
100 INJECTION, SOLUTION INTRAVENOUS CONTINUOUS
Status: CANCELLED | OUTPATIENT
Start: 2018-09-11

## 2018-09-11 RX ORDER — ACETAMINOPHEN 325 MG/1
650 TABLET ORAL ONCE
Status: CANCELLED | OUTPATIENT
Start: 2018-09-11

## 2018-09-18 ENCOUNTER — TRANSCRIBE ORDERS (OUTPATIENT)
Dept: ADMINISTRATIVE | Facility: HOSPITAL | Age: 48
End: 2018-09-18

## 2018-09-18 DIAGNOSIS — I25.10 CAD IN NATIVE ARTERY: Primary | ICD-10-CM

## 2018-09-20 ENCOUNTER — TELEPHONE (OUTPATIENT)
Dept: GYNECOLOGIC ONCOLOGY | Facility: CLINIC | Age: 48
End: 2018-09-20

## 2018-09-20 NOTE — TELEPHONE ENCOUNTER
Phoned pt regarding clearance for surgery, states is having stress Friday 9-21-18 am, will call me as soon as she leaves, the doctor is going to read it while she is there to be cleared for surgery.

## 2018-09-21 ENCOUNTER — PATIENT EDUCATION (SURGERY INSTRUCTIONS) (OUTPATIENT)
Dept: GYNECOLOGIC ONCOLOGY | Facility: CLINIC | Age: 48
End: 2018-09-21

## 2018-09-21 ENCOUNTER — HOSPITAL ENCOUNTER (OUTPATIENT)
Dept: NUCLEAR MEDICINE | Facility: HOSPITAL | Age: 48
Discharge: HOME | End: 2018-09-21
Attending: INTERNAL MEDICINE

## 2018-09-21 DIAGNOSIS — I25.10 CAD IN NATIVE ARTERY: ICD-10-CM

## 2018-09-21 LAB
BH CV STRESS COMMENTS STAGE 1: NORMAL
BH CV STRESS DOSE REGADENOSON STAGE 1: 0.4
BH CV STRESS DURATION MIN STAGE 1: 0
BH CV STRESS DURATION SEC STAGE 1: 10
BH CV STRESS PROTOCOL 1: NORMAL
BH CV STRESS RECOVERY BP: NORMAL MMHG
BH CV STRESS RECOVERY HR: 88 BPM
BH CV STRESS STAGE 1: 1
LV EF NUC BP: 57 %
MAXIMAL PREDICTED HEART RATE: 170 BPM
PERCENT MAX PREDICTED HR: 58.24 %
STRESS BASELINE BP: NORMAL MMHG
STRESS BASELINE HR: 74 BPM
STRESS PERCENT HR: 69 %
STRESS POST PEAK BP: NORMAL MMHG
STRESS POST PEAK HR: 99 BPM
STRESS TARGET HR: 145 BPM

## 2018-09-21 PROCEDURE — 0 TECHNETIUM SESTAMIBI: Performed by: INTERNAL MEDICINE

## 2018-09-21 PROCEDURE — 78452 HT MUSCLE IMAGE SPECT MULT: CPT

## 2018-09-21 PROCEDURE — 93017 CV STRESS TEST TRACING ONLY: CPT

## 2018-09-21 PROCEDURE — A9500 TC99M SESTAMIBI: HCPCS | Performed by: INTERNAL MEDICINE

## 2018-09-21 PROCEDURE — 25010000002 REGADENOSON 0.4 MG/5ML SOLUTION: Performed by: INTERNAL MEDICINE

## 2018-09-21 RX ADMIN — TECHNETIUM TC 99M SESTAMIBI 1 DOSE: 1 INJECTION INTRAVENOUS at 09:00

## 2018-09-21 RX ADMIN — REGADENOSON 0.4 MG: 0.08 INJECTION, SOLUTION INTRAVENOUS at 09:00

## 2018-09-21 RX ADMIN — TECHNETIUM TC 99M SESTAMIBI 1 DOSE: 1 INJECTION INTRAVENOUS at 07:00

## 2018-09-21 NOTE — PATIENT INSTRUCTIONS
Gynecologic Oncology  Inpatient Pre-op Patient Education  *See checked boxes for your instructions*    Patient Name:  Hector Nugent  [id number]  1970    Surgeon:  Dr. Mims    Appointment  [x]  1. Your surgery has been scheduled on 9-24-18. You will need to be at the second floor surgery registration of the [facility] hospital on that day at 10:00 AM.  Enter the campus grounds through entrance #2, park in [facility], walk up the hill into the hospital and follow that georges until you see elevators on right, use that elevator to go  to the 2nd floor, when the door opens, you will see an arrow to direct you to registration.     [x] 2.  You have a pre-admission testing (PAT) appointment for labs and possibly chest xray and EKG, on 9-23-18 at 10:45 AM.  You will need to be at hospital registration on the first floor, 10 minutes before that time.     [x] 3.  The hospital registration department is located in the long hallway between the [facility] and [address] buildings.     The Day(s) Before Surgery  [x] 1. On 9-23-18, the day prior to surgery, you will need to follow a clear liquid diet which consists of apple, grape, or cranberry juice, coffee, tea, Jell-O, broth, popsicles, and sharmin. NO MILK, CREAM, OR ORANGE JUICE.  No solid food after midnight on 9-22-18.   You may have sips of clear fluids up until two-three hours prior to your arrival to the hospital on the morning of surgery.    [] 2.  You will need to use a mild laxative at least once (either in the morning or afternoon) the day prior to surgery to clean out your bowels.  You can purchase Miralax over-the-counter at the pharmacy and follow the directions on the back.      [x] 3.  Do not take vitamins or full dose aspirin one week before surgery.  If you normally take a blood thinning medication such as Warfarin, Eliquis, or Xarelto, we will give you specific instructions regarding these medications and we may need to talk with your other doctors.   [x] 4.  On the  morning of your surgery, you may likely take your routine prescription medications with a sip of water as reviewed with you by your surgeon.  Bring your home medications with you to the hospital as we may need to reference these.   In particular be sure to bring any inhalers.     Post-surgery Instructions  [x] 1.  A normal length of stay for your type of surgery is approximately 2-3 hospital nights.  All rooms are private, so family member may stay with you.     [x] 2.  Do not take your own home prescription medication while you are in the hospital unless otherwise instructed.  These will be provided to you.         Comments:

## 2018-09-23 ENCOUNTER — HOSPITAL ENCOUNTER (OUTPATIENT)
Dept: GENERAL RADIOLOGY | Facility: HOSPITAL | Age: 48
Discharge: HOME | End: 2018-09-23
Admitting: OBSTETRICS & GYNECOLOGY

## 2018-09-23 ENCOUNTER — APPOINTMENT (OUTPATIENT)
Dept: PREADMISSION TESTING | Facility: HOSPITAL | Age: 48
End: 2018-09-23

## 2018-09-23 VITALS — HEIGHT: 65 IN | WEIGHT: 233.69 LBS | BODY MASS INDEX: 38.93 KG/M2

## 2018-09-23 DIAGNOSIS — R19.07 GENERALIZED ABDOMINAL MASS: ICD-10-CM

## 2018-09-23 LAB
ABO GROUP BLD: NORMAL
ALBUMIN SERPL-MCNC: 4.51 G/DL (ref 3.2–4.8)
ALBUMIN/GLOB SERPL: 2 G/DL (ref 1.5–2.5)
ALP SERPL-CCNC: 73 U/L (ref 25–100)
ALT SERPL W P-5'-P-CCNC: 27 U/L (ref 7–40)
AMPHET+METHAMPHET UR QL: NEGATIVE
AMPHETAMINES UR QL: NEGATIVE
ANION GAP SERPL CALCULATED.3IONS-SCNC: 11 MMOL/L (ref 3–11)
AST SERPL-CCNC: 19 U/L (ref 0–33)
BARBITURATES UR QL SCN: NEGATIVE
BASOPHILS # BLD AUTO: 0.05 10*3/MM3 (ref 0–0.2)
BASOPHILS NFR BLD AUTO: 0.4 % (ref 0–1)
BENZODIAZ UR QL SCN: NEGATIVE
BILIRUB SERPL-MCNC: 0.4 MG/DL (ref 0.3–1.2)
BLD GP AB SCN SERPL QL: NEGATIVE
BUN BLD-MCNC: 11 MG/DL (ref 9–23)
BUN/CREAT SERPL: 19.6 (ref 7–25)
BUPRENORPHINE SERPL-MCNC: NEGATIVE NG/ML
CALCIUM SPEC-SCNC: 9.1 MG/DL (ref 8.7–10.4)
CANNABINOIDS SERPL QL: NEGATIVE
CHLORIDE SERPL-SCNC: 105 MMOL/L (ref 99–109)
CO2 SERPL-SCNC: 26 MMOL/L (ref 20–31)
COCAINE UR QL: NEGATIVE
CREAT BLD-MCNC: 0.56 MG/DL (ref 0.6–1.3)
DEPRECATED RDW RBC AUTO: 40.4 FL (ref 37–54)
EOSINOPHIL # BLD AUTO: 0.3 10*3/MM3 (ref 0–0.3)
EOSINOPHIL NFR BLD AUTO: 2.4 % (ref 0–3)
ERYTHROCYTE [DISTWIDTH] IN BLOOD BY AUTOMATED COUNT: 12.6 % (ref 11.3–14.5)
GFR SERPL CREATININE-BSD FRML MDRD: 115 ML/MIN/1.73
GFR SERPL CREATININE-BSD FRML MDRD: 139 ML/MIN/1.73
GLOBULIN UR ELPH-MCNC: 2.3 GM/DL
GLUCOSE BLD-MCNC: 145 MG/DL (ref 70–100)
HBA1C MFR BLD: 8.1 % (ref 4.8–5.6)
HCT VFR BLD AUTO: 42 % (ref 34.5–44)
HGB BLD-MCNC: 13.7 G/DL (ref 11.5–15.5)
IMM GRANULOCYTES # BLD: 0.06 10*3/MM3 (ref 0–0.03)
IMM GRANULOCYTES NFR BLD: 0.5 % (ref 0–0.6)
LYMPHOCYTES # BLD AUTO: 1.87 10*3/MM3 (ref 0.6–4.8)
LYMPHOCYTES NFR BLD AUTO: 15.1 % (ref 24–44)
MCH RBC QN AUTO: 28.5 PG (ref 27–31)
MCHC RBC AUTO-ENTMCNC: 32.6 G/DL (ref 32–36)
MCV RBC AUTO: 87.5 FL (ref 80–99)
METHADONE UR QL SCN: NEGATIVE
MONOCYTES # BLD AUTO: 1.02 10*3/MM3 (ref 0–1)
MONOCYTES NFR BLD AUTO: 8.2 % (ref 0–12)
NEUTROPHILS # BLD AUTO: 9.17 10*3/MM3 (ref 1.5–8.3)
NEUTROPHILS NFR BLD AUTO: 73.9 % (ref 41–71)
OPIATES UR QL: NEGATIVE
OXYCODONE UR QL SCN: NEGATIVE
PCP UR QL SCN: NEGATIVE
PLATELET # BLD AUTO: 248 10*3/MM3 (ref 150–450)
PMV BLD AUTO: 9.8 FL (ref 6–12)
POTASSIUM BLD-SCNC: 3.9 MMOL/L (ref 3.5–5.5)
PROPOXYPH UR QL: NEGATIVE
PROT SERPL-MCNC: 6.8 G/DL (ref 5.7–8.2)
RBC # BLD AUTO: 4.8 10*6/MM3 (ref 3.89–5.14)
RH BLD: POSITIVE
SODIUM BLD-SCNC: 142 MMOL/L (ref 132–146)
T&S EXPIRATION DATE: NORMAL
TRICYCLICS UR QL SCN: NEGATIVE
WBC NRBC COR # BLD: 12.41 10*3/MM3 (ref 3.5–10.8)

## 2018-09-23 PROCEDURE — 71046 X-RAY EXAM CHEST 2 VIEWS: CPT

## 2018-09-23 PROCEDURE — 83036 HEMOGLOBIN GLYCOSYLATED A1C: CPT | Performed by: ANESTHESIOLOGY

## 2018-09-23 PROCEDURE — 86850 RBC ANTIBODY SCREEN: CPT | Performed by: OBSTETRICS & GYNECOLOGY

## 2018-09-23 PROCEDURE — 86901 BLOOD TYPING SEROLOGIC RH(D): CPT | Performed by: OBSTETRICS & GYNECOLOGY

## 2018-09-23 PROCEDURE — 85025 COMPLETE CBC W/AUTO DIFF WBC: CPT | Performed by: OBSTETRICS & GYNECOLOGY

## 2018-09-23 PROCEDURE — 36415 COLL VENOUS BLD VENIPUNCTURE: CPT

## 2018-09-23 PROCEDURE — 86900 BLOOD TYPING SEROLOGIC ABO: CPT | Performed by: OBSTETRICS & GYNECOLOGY

## 2018-09-23 PROCEDURE — 80306 DRUG TEST PRSMV INSTRMNT: CPT | Performed by: OBSTETRICS & GYNECOLOGY

## 2018-09-23 PROCEDURE — 80053 COMPREHEN METABOLIC PANEL: CPT | Performed by: OBSTETRICS & GYNECOLOGY

## 2018-09-23 RX ORDER — AMLODIPINE BESYLATE 5 MG/1
5 TABLET ORAL DAILY
COMMUNITY
End: 2021-04-19 | Stop reason: DRUGHIGH

## 2018-09-24 ENCOUNTER — HOSPITAL ENCOUNTER (INPATIENT)
Facility: HOSPITAL | Age: 48
LOS: 1 days | Discharge: HOME | End: 2018-09-25
Attending: OBSTETRICS & GYNECOLOGY | Admitting: OBSTETRICS & GYNECOLOGY

## 2018-09-24 ENCOUNTER — ANESTHESIA (OUTPATIENT)
Dept: PERIOP | Facility: HOSPITAL | Age: 48
End: 2018-09-24

## 2018-09-24 ENCOUNTER — ANESTHESIA EVENT (OUTPATIENT)
Dept: PERIOP | Facility: HOSPITAL | Age: 48
End: 2018-09-24

## 2018-09-24 DIAGNOSIS — R19.07 GENERALIZED ABDOMINAL MASS: ICD-10-CM

## 2018-09-24 LAB
ABO GROUP BLD: NORMAL
B-HCG UR QL: NEGATIVE
GLUCOSE BLDC GLUCOMTR-MCNC: 149 MG/DL (ref 70–130)
GLUCOSE BLDC GLUCOMTR-MCNC: 194 MG/DL (ref 70–130)
INTERNAL NEGATIVE CONTROL: NEGATIVE
INTERNAL POSITIVE CONTROL: POSITIVE
Lab: NORMAL
RH BLD: POSITIVE

## 2018-09-24 PROCEDURE — 88307 TISSUE EXAM BY PATHOLOGIST: CPT | Performed by: OBSTETRICS & GYNECOLOGY

## 2018-09-24 PROCEDURE — 25010000002 ONDANSETRON PER 1 MG: Performed by: NURSE ANESTHETIST, CERTIFIED REGISTERED

## 2018-09-24 PROCEDURE — 25010000002 PROPOFOL 10 MG/ML EMULSION: Performed by: NURSE ANESTHETIST, CERTIFIED REGISTERED

## 2018-09-24 PROCEDURE — 25010000002 BUPRENORPHINE PER 0.1 MG: Performed by: NURSE ANESTHETIST, CERTIFIED REGISTERED

## 2018-09-24 PROCEDURE — 82962 GLUCOSE BLOOD TEST: CPT

## 2018-09-24 PROCEDURE — 58150 TOTAL HYSTERECTOMY: CPT | Performed by: OBSTETRICS & GYNECOLOGY

## 2018-09-24 PROCEDURE — 94799 UNLISTED PULMONARY SVC/PX: CPT

## 2018-09-24 PROCEDURE — 88331 PATH CONSLTJ SURG 1 BLK 1SPC: CPT | Performed by: OBSTETRICS & GYNECOLOGY

## 2018-09-24 PROCEDURE — 25010000002 NEOSTIGMINE 10 MG/10ML SOLUTION: Performed by: NURSE ANESTHETIST, CERTIFIED REGISTERED

## 2018-09-24 PROCEDURE — 88302 TISSUE EXAM BY PATHOLOGIST: CPT | Performed by: OBSTETRICS & GYNECOLOGY

## 2018-09-24 PROCEDURE — 25010000002 DEXAMETHASONE SODIUM PHOSPHATE 10 MG/ML SOLUTION 1 ML VIAL: Performed by: NURSE ANESTHETIST, CERTIFIED REGISTERED

## 2018-09-24 PROCEDURE — 86901 BLOOD TYPING SEROLOGIC RH(D): CPT

## 2018-09-24 PROCEDURE — 25010000002 PHENYLEPHRINE PER 1 ML: Performed by: NURSE ANESTHETIST, CERTIFIED REGISTERED

## 2018-09-24 PROCEDURE — 25010000002 DEXAMETHASONE SODIUM PHOSPHATE 10 MG/ML SOLUTION: Performed by: NURSE ANESTHETIST, CERTIFIED REGISTERED

## 2018-09-24 PROCEDURE — 0UT90ZZ RESECTION OF UTERUS, OPEN APPROACH: ICD-10-PCS | Performed by: OBSTETRICS & GYNECOLOGY

## 2018-09-24 PROCEDURE — 0DTJ0ZZ RESECTION OF APPENDIX, OPEN APPROACH: ICD-10-PCS | Performed by: OBSTETRICS & GYNECOLOGY

## 2018-09-24 PROCEDURE — 63710000001 INSULIN REGULAR HUMAN PER 5 UNITS: Performed by: OBSTETRICS & GYNECOLOGY

## 2018-09-24 PROCEDURE — 81025 URINE PREGNANCY TEST: CPT | Performed by: ANESTHESIOLOGY

## 2018-09-24 PROCEDURE — 25010000002 MORPHINE SULFATE (PF) 2 MG/ML SOLUTION: Performed by: OBSTETRICS & GYNECOLOGY

## 2018-09-24 PROCEDURE — 94640 AIRWAY INHALATION TREATMENT: CPT

## 2018-09-24 PROCEDURE — 86900 BLOOD TYPING SEROLOGIC ABO: CPT

## 2018-09-24 PROCEDURE — 25010000002 CEFOXITIN: Performed by: OBSTETRICS & GYNECOLOGY

## 2018-09-24 PROCEDURE — 0UT70ZZ RESECTION OF BILATERAL FALLOPIAN TUBES, OPEN APPROACH: ICD-10-PCS | Performed by: OBSTETRICS & GYNECOLOGY

## 2018-09-24 PROCEDURE — 0UT20ZZ RESECTION OF BILATERAL OVARIES, OPEN APPROACH: ICD-10-PCS | Performed by: OBSTETRICS & GYNECOLOGY

## 2018-09-24 PROCEDURE — 25010000002 FENTANYL CITRATE (PF) 100 MCG/2ML SOLUTION: Performed by: NURSE ANESTHETIST, CERTIFIED REGISTERED

## 2018-09-24 RX ORDER — ACETAMINOPHEN 325 MG/1
650 TABLET ORAL EVERY 6 HOURS SCHEDULED
Status: DISCONTINUED | OUTPATIENT
Start: 2018-09-24 | End: 2018-09-25 | Stop reason: HOSPADM

## 2018-09-24 RX ORDER — LIDOCAINE HYDROCHLORIDE 10 MG/ML
0.5 INJECTION, SOLUTION EPIDURAL; INFILTRATION; INTRACAUDAL; PERINEURAL ONCE AS NEEDED
Status: COMPLETED | OUTPATIENT
Start: 2018-09-24 | End: 2018-09-24

## 2018-09-24 RX ORDER — NEOSTIGMINE METHYLSULFATE 1 MG/ML
INJECTION, SOLUTION INTRAVENOUS AS NEEDED
Status: DISCONTINUED | OUTPATIENT
Start: 2018-09-24 | End: 2018-09-24 | Stop reason: SURG

## 2018-09-24 RX ORDER — ONDANSETRON 2 MG/ML
INJECTION INTRAMUSCULAR; INTRAVENOUS AS NEEDED
Status: DISCONTINUED | OUTPATIENT
Start: 2018-09-24 | End: 2018-09-24 | Stop reason: SURG

## 2018-09-24 RX ORDER — OXYCODONE HYDROCHLORIDE 5 MG/1
5 TABLET ORAL EVERY 4 HOURS PRN
Status: DISCONTINUED | OUTPATIENT
Start: 2018-09-24 | End: 2018-09-25 | Stop reason: HOSPADM

## 2018-09-24 RX ORDER — SODIUM CHLORIDE, SODIUM LACTATE, POTASSIUM CHLORIDE, CALCIUM CHLORIDE 600; 310; 30; 20 MG/100ML; MG/100ML; MG/100ML; MG/100ML
100 INJECTION, SOLUTION INTRAVENOUS CONTINUOUS
Status: DISCONTINUED | OUTPATIENT
Start: 2018-09-24 | End: 2018-09-24

## 2018-09-24 RX ORDER — ROCURONIUM BROMIDE 10 MG/ML
INJECTION, SOLUTION INTRAVENOUS AS NEEDED
Status: DISCONTINUED | OUTPATIENT
Start: 2018-09-24 | End: 2018-09-24 | Stop reason: SURG

## 2018-09-24 RX ORDER — ONDANSETRON 4 MG/1
4 TABLET, FILM COATED ORAL EVERY 6 HOURS PRN
Status: DISCONTINUED | OUTPATIENT
Start: 2018-09-24 | End: 2018-09-25 | Stop reason: HOSPADM

## 2018-09-24 RX ORDER — LISINOPRIL 20 MG/1
20 TABLET ORAL DAILY
Status: DISCONTINUED | OUTPATIENT
Start: 2018-09-25 | End: 2018-09-25 | Stop reason: HOSPADM

## 2018-09-24 RX ORDER — DOCUSATE SODIUM 100 MG/1
200 CAPSULE, LIQUID FILLED ORAL 2 TIMES DAILY
Status: DISCONTINUED | OUTPATIENT
Start: 2018-09-24 | End: 2018-09-25 | Stop reason: HOSPADM

## 2018-09-24 RX ORDER — NICOTINE 21 MG/24HR
1 PATCH, TRANSDERMAL 24 HOURS TRANSDERMAL
Status: DISCONTINUED | OUTPATIENT
Start: 2018-09-24 | End: 2018-09-25 | Stop reason: HOSPADM

## 2018-09-24 RX ORDER — PROPOFOL 10 MG/ML
VIAL (ML) INTRAVENOUS AS NEEDED
Status: DISCONTINUED | OUTPATIENT
Start: 2018-09-24 | End: 2018-09-24 | Stop reason: SURG

## 2018-09-24 RX ORDER — GABAPENTIN 400 MG/1
400 CAPSULE ORAL 3 TIMES DAILY
Status: DISCONTINUED | OUTPATIENT
Start: 2018-09-24 | End: 2018-09-25 | Stop reason: HOSPADM

## 2018-09-24 RX ORDER — DIPHENHYDRAMINE HCL 25 MG
25 CAPSULE ORAL NIGHTLY PRN
Status: DISCONTINUED | OUTPATIENT
Start: 2018-09-24 | End: 2018-09-25 | Stop reason: HOSPADM

## 2018-09-24 RX ORDER — PROMETHAZINE HYDROCHLORIDE 25 MG/1
25 SUPPOSITORY RECTAL ONCE AS NEEDED
Status: DISCONTINUED | OUTPATIENT
Start: 2018-09-24 | End: 2018-09-24 | Stop reason: HOSPADM

## 2018-09-24 RX ORDER — PROMETHAZINE HYDROCHLORIDE 25 MG/ML
6.25 INJECTION, SOLUTION INTRAMUSCULAR; INTRAVENOUS ONCE AS NEEDED
Status: DISCONTINUED | OUTPATIENT
Start: 2018-09-24 | End: 2018-09-24 | Stop reason: HOSPADM

## 2018-09-24 RX ORDER — DIPHENHYDRAMINE HYDROCHLORIDE 50 MG/ML
25 INJECTION INTRAMUSCULAR; INTRAVENOUS NIGHTLY PRN
Status: DISCONTINUED | OUTPATIENT
Start: 2018-09-24 | End: 2018-09-25 | Stop reason: HOSPADM

## 2018-09-24 RX ORDER — PROPOFOL 10 MG/ML
VIAL (ML) INTRAVENOUS CONTINUOUS PRN
Status: DISCONTINUED | OUTPATIENT
Start: 2018-09-24 | End: 2018-09-24 | Stop reason: SURG

## 2018-09-24 RX ORDER — SODIUM CHLORIDE 9 MG/ML
75 INJECTION, SOLUTION INTRAVENOUS CONTINUOUS
Status: DISCONTINUED | OUTPATIENT
Start: 2018-09-24 | End: 2018-09-25

## 2018-09-24 RX ORDER — DEXTROSE MONOHYDRATE 25 G/50ML
25 INJECTION, SOLUTION INTRAVENOUS
Status: DISCONTINUED | OUTPATIENT
Start: 2018-09-24 | End: 2018-09-25 | Stop reason: HOSPADM

## 2018-09-24 RX ORDER — ATORVASTATIN CALCIUM 40 MG/1
80 TABLET, FILM COATED ORAL DAILY
Status: DISCONTINUED | OUTPATIENT
Start: 2018-09-25 | End: 2018-09-25 | Stop reason: HOSPADM

## 2018-09-24 RX ORDER — FAMOTIDINE 20 MG/1
20 TABLET, FILM COATED ORAL ONCE
Status: COMPLETED | OUTPATIENT
Start: 2018-09-24 | End: 2018-09-24

## 2018-09-24 RX ORDER — HYDROMORPHONE HYDROCHLORIDE 1 MG/ML
0.5 INJECTION, SOLUTION INTRAMUSCULAR; INTRAVENOUS; SUBCUTANEOUS
Status: DISCONTINUED | OUTPATIENT
Start: 2018-09-24 | End: 2018-09-24 | Stop reason: HOSPADM

## 2018-09-24 RX ORDER — CELECOXIB 200 MG/1
200 CAPSULE ORAL ONCE
Status: COMPLETED | OUTPATIENT
Start: 2018-09-24 | End: 2018-09-24

## 2018-09-24 RX ORDER — LABETALOL HYDROCHLORIDE 5 MG/ML
5 INJECTION, SOLUTION INTRAVENOUS
Status: DISCONTINUED | OUTPATIENT
Start: 2018-09-24 | End: 2018-09-24 | Stop reason: HOSPADM

## 2018-09-24 RX ORDER — HYDROMORPHONE HYDROCHLORIDE 1 MG/ML
0.5 INJECTION, SOLUTION INTRAMUSCULAR; INTRAVENOUS; SUBCUTANEOUS
Status: DISCONTINUED | OUTPATIENT
Start: 2018-09-24 | End: 2018-09-25 | Stop reason: HOSPADM

## 2018-09-24 RX ORDER — IPRATROPIUM BROMIDE AND ALBUTEROL SULFATE 2.5; .5 MG/3ML; MG/3ML
3 SOLUTION RESPIRATORY (INHALATION) ONCE AS NEEDED
Status: DISCONTINUED | OUTPATIENT
Start: 2018-09-24 | End: 2018-09-24 | Stop reason: HOSPADM

## 2018-09-24 RX ORDER — OXYCODONE HYDROCHLORIDE 5 MG/1
10 TABLET ORAL EVERY 4 HOURS PRN
Status: DISCONTINUED | OUTPATIENT
Start: 2018-09-24 | End: 2018-09-25 | Stop reason: HOSPADM

## 2018-09-24 RX ORDER — FAMOTIDINE 10 MG/ML
20 INJECTION, SOLUTION INTRAVENOUS ONCE
Status: CANCELLED | OUTPATIENT
Start: 2018-09-24 | End: 2018-09-24

## 2018-09-24 RX ORDER — LIDOCAINE HYDROCHLORIDE 10 MG/ML
INJECTION, SOLUTION EPIDURAL; INFILTRATION; INTRACAUDAL; PERINEURAL AS NEEDED
Status: DISCONTINUED | OUTPATIENT
Start: 2018-09-24 | End: 2018-09-24 | Stop reason: SURG

## 2018-09-24 RX ORDER — CLOPIDOGREL BISULFATE 75 MG/1
75 TABLET ORAL DAILY
Status: DISCONTINUED | OUTPATIENT
Start: 2018-09-25 | End: 2018-09-25 | Stop reason: HOSPADM

## 2018-09-24 RX ORDER — MAGNESIUM HYDROXIDE 1200 MG/15ML
LIQUID ORAL AS NEEDED
Status: DISCONTINUED | OUTPATIENT
Start: 2018-09-24 | End: 2018-09-24 | Stop reason: HOSPADM

## 2018-09-24 RX ORDER — FENTANYL CITRATE 50 UG/ML
50 INJECTION, SOLUTION INTRAMUSCULAR; INTRAVENOUS
Status: DISCONTINUED | OUTPATIENT
Start: 2018-09-24 | End: 2018-09-24 | Stop reason: HOSPADM

## 2018-09-24 RX ORDER — NALOXONE HCL 0.4 MG/ML
0.4 VIAL (ML) INJECTION
Status: DISCONTINUED | OUTPATIENT
Start: 2018-09-24 | End: 2018-09-25 | Stop reason: HOSPADM

## 2018-09-24 RX ORDER — PREGABALIN 150 MG/1
150 CAPSULE ORAL ONCE
Status: COMPLETED | OUTPATIENT
Start: 2018-09-24 | End: 2018-09-24

## 2018-09-24 RX ORDER — FENTANYL CITRATE 50 UG/ML
INJECTION, SOLUTION INTRAMUSCULAR; INTRAVENOUS AS NEEDED
Status: DISCONTINUED | OUTPATIENT
Start: 2018-09-24 | End: 2018-09-24 | Stop reason: SURG

## 2018-09-24 RX ORDER — NICOTINE POLACRILEX 4 MG
15 LOZENGE BUCCAL
Status: DISCONTINUED | OUTPATIENT
Start: 2018-09-24 | End: 2018-09-25 | Stop reason: HOSPADM

## 2018-09-24 RX ORDER — CARVEDILOL 6.25 MG/1
6.25 TABLET ORAL 2 TIMES DAILY WITH MEALS
Status: DISCONTINUED | OUTPATIENT
Start: 2018-09-24 | End: 2018-09-25 | Stop reason: HOSPADM

## 2018-09-24 RX ORDER — MORPHINE SULFATE 2 MG/ML
2 INJECTION, SOLUTION INTRAMUSCULAR; INTRAVENOUS EVERY 4 HOURS PRN
Status: DISCONTINUED | OUTPATIENT
Start: 2018-09-24 | End: 2018-09-24

## 2018-09-24 RX ORDER — ALBUTEROL SULFATE 2.5 MG/3ML
2.5 SOLUTION RESPIRATORY (INHALATION)
Status: DISCONTINUED | OUTPATIENT
Start: 2018-09-24 | End: 2018-09-25 | Stop reason: HOSPADM

## 2018-09-24 RX ORDER — IBUPROFEN 600 MG/1
600 TABLET ORAL EVERY 6 HOURS PRN
Status: DISCONTINUED | OUTPATIENT
Start: 2018-09-24 | End: 2018-09-25 | Stop reason: HOSPADM

## 2018-09-24 RX ORDER — SODIUM CHLORIDE 0.9 % (FLUSH) 0.9 %
1-10 SYRINGE (ML) INJECTION AS NEEDED
Status: DISCONTINUED | OUTPATIENT
Start: 2018-09-24 | End: 2018-09-24 | Stop reason: HOSPADM

## 2018-09-24 RX ORDER — SCOLOPAMINE TRANSDERMAL SYSTEM 1 MG/1
1 PATCH, EXTENDED RELEASE TRANSDERMAL
Status: DISCONTINUED | OUTPATIENT
Start: 2018-09-24 | End: 2018-09-24

## 2018-09-24 RX ORDER — GLYCOPYRROLATE 0.2 MG/ML
INJECTION INTRAMUSCULAR; INTRAVENOUS AS NEEDED
Status: DISCONTINUED | OUTPATIENT
Start: 2018-09-24 | End: 2018-09-24 | Stop reason: SURG

## 2018-09-24 RX ORDER — PROMETHAZINE HYDROCHLORIDE 25 MG/1
25 TABLET ORAL ONCE AS NEEDED
Status: DISCONTINUED | OUTPATIENT
Start: 2018-09-24 | End: 2018-09-24 | Stop reason: HOSPADM

## 2018-09-24 RX ORDER — AMLODIPINE BESYLATE 5 MG/1
5 TABLET ORAL NIGHTLY
Status: DISCONTINUED | OUTPATIENT
Start: 2018-09-24 | End: 2018-09-25 | Stop reason: HOSPADM

## 2018-09-24 RX ORDER — DEXAMETHASONE SODIUM PHOSPHATE 10 MG/ML
INJECTION, SOLUTION INTRAMUSCULAR; INTRAVENOUS AS NEEDED
Status: DISCONTINUED | OUTPATIENT
Start: 2018-09-24 | End: 2018-09-24 | Stop reason: SURG

## 2018-09-24 RX ORDER — ACETAMINOPHEN 325 MG/1
650 TABLET ORAL ONCE
Status: COMPLETED | OUTPATIENT
Start: 2018-09-24 | End: 2018-09-24

## 2018-09-24 RX ORDER — ATRACURIUM BESYLATE 10 MG/ML
INJECTION, SOLUTION INTRAVENOUS AS NEEDED
Status: DISCONTINUED | OUTPATIENT
Start: 2018-09-24 | End: 2018-09-24

## 2018-09-24 RX ORDER — ESMOLOL HYDROCHLORIDE 10 MG/ML
INJECTION INTRAVENOUS AS NEEDED
Status: DISCONTINUED | OUTPATIENT
Start: 2018-09-24 | End: 2018-09-24 | Stop reason: SURG

## 2018-09-24 RX ORDER — SODIUM CHLORIDE, SODIUM LACTATE, POTASSIUM CHLORIDE, CALCIUM CHLORIDE 600; 310; 30; 20 MG/100ML; MG/100ML; MG/100ML; MG/100ML
9 INJECTION, SOLUTION INTRAVENOUS CONTINUOUS
Status: DISCONTINUED | OUTPATIENT
Start: 2018-09-24 | End: 2018-09-24

## 2018-09-24 RX ORDER — ONDANSETRON 2 MG/ML
4 INJECTION INTRAMUSCULAR; INTRAVENOUS EVERY 6 HOURS PRN
Status: DISCONTINUED | OUTPATIENT
Start: 2018-09-24 | End: 2018-09-25 | Stop reason: HOSPADM

## 2018-09-24 RX ADMIN — GABAPENTIN 400 MG: 400 CAPSULE ORAL at 22:15

## 2018-09-24 RX ADMIN — SODIUM CHLORIDE, POTASSIUM CHLORIDE, SODIUM LACTATE AND CALCIUM CHLORIDE: 600; 310; 30; 20 INJECTION, SOLUTION INTRAVENOUS at 13:55

## 2018-09-24 RX ADMIN — LIDOCAINE HYDROCHLORIDE 50 MG: 10 INJECTION, SOLUTION EPIDURAL; INFILTRATION; INTRACAUDAL; PERINEURAL at 11:48

## 2018-09-24 RX ADMIN — PREGABALIN 150 MG: 150 CAPSULE ORAL at 11:10

## 2018-09-24 RX ADMIN — PROPOFOL 25 MCG/KG/MIN: 10 INJECTION, EMULSION INTRAVENOUS at 12:00

## 2018-09-24 RX ADMIN — ROCURONIUM BROMIDE 50 MG: 10 SOLUTION INTRAVENOUS at 11:48

## 2018-09-24 RX ADMIN — ACETAMINOPHEN 650 MG: 325 TABLET ORAL at 11:10

## 2018-09-24 RX ADMIN — LIDOCAINE HYDROCHLORIDE 0.2 ML: 10 INJECTION, SOLUTION EPIDURAL; INFILTRATION; INTRACAUDAL; PERINEURAL at 11:12

## 2018-09-24 RX ADMIN — POLYETHYLENE GLYCOL (3350) 17 G: 17 POWDER, FOR SOLUTION ORAL at 17:27

## 2018-09-24 RX ADMIN — DOCUSATE SODIUM 200 MG: 100 CAPSULE, LIQUID FILLED ORAL at 22:16

## 2018-09-24 RX ADMIN — SCOPOLAMINE 1 PATCH: 1 PATCH, EXTENDED RELEASE TRANSDERMAL at 11:27

## 2018-09-24 RX ADMIN — SODIUM CHLORIDE, POTASSIUM CHLORIDE, SODIUM LACTATE AND CALCIUM CHLORIDE 9 ML/HR: 600; 310; 30; 20 INJECTION, SOLUTION INTRAVENOUS at 11:12

## 2018-09-24 RX ADMIN — AMLODIPINE BESYLATE 5 MG: 5 TABLET ORAL at 22:16

## 2018-09-24 RX ADMIN — GLYCOPYRROLATE 0.4 MG: 0.2 INJECTION, SOLUTION INTRAMUSCULAR; INTRAVENOUS at 13:59

## 2018-09-24 RX ADMIN — ROCURONIUM BROMIDE 10 MG: 10 SOLUTION INTRAVENOUS at 12:45

## 2018-09-24 RX ADMIN — ROCURONIUM BROMIDE 10 MG: 10 SOLUTION INTRAVENOUS at 13:10

## 2018-09-24 RX ADMIN — ALBUTEROL SULFATE 2.5 MG: 2.5 SOLUTION RESPIRATORY (INHALATION) at 18:50

## 2018-09-24 RX ADMIN — DEXAMETHASONE SODIUM PHOSPHATE 61.4 ML: 10 INJECTION, SOLUTION INTRAMUSCULAR; INTRAVENOUS at 11:54

## 2018-09-24 RX ADMIN — PHENYLEPHRINE HYDROCHLORIDE 80 MCG: 10 INJECTION INTRAVENOUS at 12:15

## 2018-09-24 RX ADMIN — NICOTINE 1 PATCH: 14 PATCH, EXTENDED RELEASE TRANSDERMAL at 17:27

## 2018-09-24 RX ADMIN — CARVEDILOL 6.25 MG: 6.25 TABLET, FILM COATED ORAL at 17:27

## 2018-09-24 RX ADMIN — CELECOXIB 200 MG: 200 CAPSULE ORAL at 11:10

## 2018-09-24 RX ADMIN — DEXAMETHASONE SODIUM PHOSPHATE 6 MG: 10 INJECTION, SOLUTION INTRAMUSCULAR; INTRAVENOUS at 12:06

## 2018-09-24 RX ADMIN — ESMOLOL HYDROCHLORIDE 30 MG: 10 INJECTION INTRAVENOUS at 11:55

## 2018-09-24 RX ADMIN — FAMOTIDINE 20 MG: 20 TABLET, FILM COATED ORAL at 11:12

## 2018-09-24 RX ADMIN — PHENYLEPHRINE HYDROCHLORIDE 80 MCG: 10 INJECTION INTRAVENOUS at 12:03

## 2018-09-24 RX ADMIN — NEOSTIGMINE METHYLSULFATE 3 MG: 1 INJECTION, SOLUTION INTRAVENOUS at 13:59

## 2018-09-24 RX ADMIN — PHENYLEPHRINE HYDROCHLORIDE 80 MCG: 10 INJECTION INTRAVENOUS at 12:28

## 2018-09-24 RX ADMIN — SODIUM CHLORIDE 75 ML/HR: 9 INJECTION, SOLUTION INTRAVENOUS at 16:02

## 2018-09-24 RX ADMIN — FENTANYL CITRATE 50 MCG: 50 INJECTION, SOLUTION INTRAMUSCULAR; INTRAVENOUS at 11:48

## 2018-09-24 RX ADMIN — SODIUM CHLORIDE, POTASSIUM CHLORIDE, SODIUM LACTATE AND CALCIUM CHLORIDE: 600; 310; 30; 20 INJECTION, SOLUTION INTRAVENOUS at 12:50

## 2018-09-24 RX ADMIN — CEFOXITIN 2 G: 2 INJECTION, POWDER, FOR SOLUTION INTRAVENOUS at 12:00

## 2018-09-24 RX ADMIN — MORPHINE SULFATE 2 MG: 2 INJECTION, SOLUTION INTRAMUSCULAR; INTRAVENOUS at 19:19

## 2018-09-24 RX ADMIN — GABAPENTIN 400 MG: 400 CAPSULE ORAL at 17:26

## 2018-09-24 RX ADMIN — PROPOFOL 200 MG: 10 INJECTION, EMULSION INTRAVENOUS at 11:48

## 2018-09-24 RX ADMIN — ACETAMINOPHEN 650 MG: 325 TABLET, FILM COATED ORAL at 17:26

## 2018-09-24 RX ADMIN — ONDANSETRON 4 MG: 2 INJECTION INTRAMUSCULAR; INTRAVENOUS at 13:47

## 2018-09-24 RX ADMIN — FENTANYL CITRATE 50 MCG: 50 INJECTION, SOLUTION INTRAMUSCULAR; INTRAVENOUS at 12:22

## 2018-09-24 RX ADMIN — INSULIN HUMAN 2 UNITS: 100 INJECTION, SOLUTION PARENTERAL at 17:50

## 2018-09-24 NOTE — ANESTHESIA PROCEDURE NOTES
Peripheral Block      Patient location during procedure: OR  Reason for block: at surgeon's request and post-op pain management  Performed by  CRNA: BOYD MORALES  Preanesthetic Checklist  Completed: patient identified, site marked, surgical consent, pre-op evaluation, timeout performed, IV checked, risks and benefits discussed and monitors and equipment checked  Prep:  Pt Position: supine  Sterile barriers:cap, gloves, sterile barriers and mask  Prep: ChloraPrep  Patient monitoring: blood pressure monitoring, continuous pulse oximetry and EKG  Procedure  Sedation:yes  Performed under: general  Guidance:ultrasound guided  Images:still images obtained    Laterality:Bilateral  Block Type:TAP  Injection Technique:single-shot  Needle Type:short-bevel and echogenic  Needle Gauge:20 G    Medications  Comment:Block Injection:  LA dose divided between Right and Left block       Adjuncts:  Decadron 4mg PSF, Buprenex 0.3mg (Per total volume of LA)  Local Injected:bupivacaine 0.25% Local Amount Injected:60mL  Post Assessment  Injection Assessment: negative aspiration for heme, incremental injection and no paresthesia on injection  Patient Tolerance:comfortable throughout block  Complications:no  Additional Notes      Under Ultrasound guidance, a BBraun 4inch 360 degree needle was advanced with Normal Saline hydro dissection of tissue.  The Internal Oblique and Transversus Abdominus muscles where visualized.  At or before the aponeurosis of Internal Oblique, local anesthetic spread was visualized in the Transversus Abdominus Plane. Injection was made incrementally with aspiration every 5 mls.  There was no  intravascular injection,  injection pressure was normal, there was no neural injection, and the procedure was completed without difficulty.  Thank You.

## 2018-09-24 NOTE — ANESTHESIA PREPROCEDURE EVALUATION
Anesthesia Evaluation     Patient summary reviewed and Nursing notes reviewed   history of anesthetic complications: PONV  NPO Solid Status: > 8 hours  NPO Liquid Status: > 8 hours           Airway   Mallampati: II  TM distance: >3 FB  Neck ROM: full  No difficulty expected  Dental      Pulmonary - normal exam   (+) sleep apnea,   Cardiovascular - normal exam    (+) hypertension, past MI , CAD, hyperlipidemia,     ROS comment: SEE RECENT STRESS TEST    To discus with surgeon    Neuro/Psych  GI/Hepatic/Renal/Endo    (+)   diabetes mellitus,     Musculoskeletal     Abdominal    Substance History      OB/GYN          Other   (+) arthritis                   Anesthesia Plan    ASA 2     general   (Tap      Very deltailed discussion of cardiac risk with patient and 3 family members.  Risks benefits and alternatives given the result of fridays stress test were discussed and the decision to proceed with surgery today was reached.)  intravenous induction   Anesthetic plan, all risks, benefits, and alternatives have been provided, discussed and informed consent has been obtained with: patient.    Plan discussed with CRNA.

## 2018-09-24 NOTE — ANESTHESIA PROCEDURE NOTES
Airway  Urgency: elective    Airway not difficult    General Information and Staff    Patient location during procedure: OR  CRNA: BOYD MORALES    Indications and Patient Condition  Indications for airway management: airway protection    Preoxygenated: yes  MILS not maintained throughout  Mask difficulty assessment: 1 - vent by mask    Final Airway Details  Final airway type: endotracheal airway      Successful airway: ETT  Cuffed: yes   Successful intubation technique: direct laryngoscopy  Endotracheal tube insertion site: oral  Blade: Lowery  Blade size: 2  ETT size: 7.0 mm  Cormack-Lehane Classification: grade I - full view of glottis  Placement verified by: chest auscultation and capnometry   Inital cuff pressure (cm H2O): 21  Cuff volume (mL): 5  Measured from: lips  ETT to lips (cm): 21  Number of attempts at approach: 1    Additional Comments  Negative epigastric sounds, Breath sound equal bilaterally with symmetric chest rise and fall

## 2018-09-25 VITALS
HEART RATE: 68 BPM | DIASTOLIC BLOOD PRESSURE: 56 MMHG | HEIGHT: 65 IN | TEMPERATURE: 98.5 F | WEIGHT: 233.69 LBS | BODY MASS INDEX: 38.93 KG/M2 | SYSTOLIC BLOOD PRESSURE: 110 MMHG | OXYGEN SATURATION: 99 % | RESPIRATION RATE: 16 BRPM

## 2018-09-25 PROBLEM — R10.84 GENERALIZED ABDOMINAL PAIN: Status: RESOLVED | Noted: 2018-09-10 | Resolved: 2018-09-25

## 2018-09-25 PROBLEM — R19.07 GENERALIZED ABDOMINAL MASS: Status: RESOLVED | Noted: 2018-09-10 | Resolved: 2018-09-25

## 2018-09-25 LAB
ANION GAP SERPL CALCULATED.3IONS-SCNC: 3 MMOL/L (ref 3–11)
BUN BLD-MCNC: 7 MG/DL (ref 9–23)
BUN/CREAT SERPL: 10.4 (ref 7–25)
CALCIUM SPEC-SCNC: 8.6 MG/DL (ref 8.7–10.4)
CHLORIDE SERPL-SCNC: 107 MMOL/L (ref 99–109)
CO2 SERPL-SCNC: 25 MMOL/L (ref 20–31)
CREAT BLD-MCNC: 0.67 MG/DL (ref 0.6–1.3)
DEPRECATED RDW RBC AUTO: 40.2 FL (ref 37–54)
ERYTHROCYTE [DISTWIDTH] IN BLOOD BY AUTOMATED COUNT: 12.5 % (ref 11.3–14.5)
GFR SERPL CREATININE-BSD FRML MDRD: 113 ML/MIN/1.73
GFR SERPL CREATININE-BSD FRML MDRD: 93 ML/MIN/1.73
GLUCOSE BLD-MCNC: 213 MG/DL (ref 70–100)
GLUCOSE BLDC GLUCOMTR-MCNC: 190 MG/DL (ref 70–130)
GLUCOSE BLDC GLUCOMTR-MCNC: 234 MG/DL (ref 70–130)
GLUCOSE BLDC GLUCOMTR-MCNC: 236 MG/DL (ref 70–130)
HCT VFR BLD AUTO: 38.6 % (ref 34.5–44)
HGB BLD-MCNC: 12.4 G/DL (ref 11.5–15.5)
MCH RBC QN AUTO: 28.4 PG (ref 27–31)
MCHC RBC AUTO-ENTMCNC: 32.1 G/DL (ref 32–36)
MCV RBC AUTO: 88.3 FL (ref 80–99)
PLATELET # BLD AUTO: 259 10*3/MM3 (ref 150–450)
PMV BLD AUTO: 10.1 FL (ref 6–12)
POTASSIUM BLD-SCNC: 4.4 MMOL/L (ref 3.5–5.5)
RBC # BLD AUTO: 4.37 10*6/MM3 (ref 3.89–5.14)
SODIUM BLD-SCNC: 135 MMOL/L (ref 132–146)
WBC NRBC COR # BLD: 16.01 10*3/MM3 (ref 3.5–10.8)

## 2018-09-25 PROCEDURE — 63710000001 INSULIN REGULAR HUMAN PER 5 UNITS: Performed by: OBSTETRICS & GYNECOLOGY

## 2018-09-25 PROCEDURE — 94640 AIRWAY INHALATION TREATMENT: CPT

## 2018-09-25 PROCEDURE — 80048 BASIC METABOLIC PNL TOTAL CA: CPT | Performed by: OBSTETRICS & GYNECOLOGY

## 2018-09-25 PROCEDURE — 85027 COMPLETE CBC AUTOMATED: CPT | Performed by: OBSTETRICS & GYNECOLOGY

## 2018-09-25 PROCEDURE — 82962 GLUCOSE BLOOD TEST: CPT

## 2018-09-25 PROCEDURE — 25010000002 ENOXAPARIN PER 10 MG: Performed by: OBSTETRICS & GYNECOLOGY

## 2018-09-25 RX ORDER — IBUPROFEN 600 MG/1
600 TABLET ORAL EVERY 6 HOURS PRN
Qty: 30 TABLET | Refills: 0 | Status: SHIPPED | OUTPATIENT
Start: 2018-09-25 | End: 2020-06-19

## 2018-09-25 RX ORDER — DOCUSATE SODIUM 250 MG
250 CAPSULE ORAL 2 TIMES DAILY PRN
Qty: 60 CAPSULE | Refills: 1 | Status: SHIPPED | OUTPATIENT
Start: 2018-09-25 | End: 2020-06-19

## 2018-09-25 RX ORDER — ACETAMINOPHEN 325 MG/1
650 TABLET ORAL EVERY 4 HOURS PRN
Qty: 30 TABLET | Refills: 0 | Status: SHIPPED | OUTPATIENT
Start: 2018-09-25 | End: 2020-06-19

## 2018-09-25 RX ADMIN — POLYETHYLENE GLYCOL (3350) 17 G: 17 POWDER, FOR SOLUTION ORAL at 08:47

## 2018-09-25 RX ADMIN — NICOTINE 1 PATCH: 14 PATCH, EXTENDED RELEASE TRANSDERMAL at 08:48

## 2018-09-25 RX ADMIN — ACETAMINOPHEN 650 MG: 325 TABLET, FILM COATED ORAL at 11:42

## 2018-09-25 RX ADMIN — ACETAMINOPHEN 650 MG: 325 TABLET, FILM COATED ORAL at 00:33

## 2018-09-25 RX ADMIN — DOCUSATE SODIUM 200 MG: 100 CAPSULE, LIQUID FILLED ORAL at 08:46

## 2018-09-25 RX ADMIN — SODIUM CHLORIDE 75 ML/HR: 9 INJECTION, SOLUTION INTRAVENOUS at 04:19

## 2018-09-25 RX ADMIN — LISINOPRIL 20 MG: 20 TABLET ORAL at 08:47

## 2018-09-25 RX ADMIN — OXYCODONE HYDROCHLORIDE 5 MG: 5 TABLET ORAL at 08:45

## 2018-09-25 RX ADMIN — ALBUTEROL SULFATE 2.5 MG: 2.5 SOLUTION RESPIRATORY (INHALATION) at 08:43

## 2018-09-25 RX ADMIN — IBUPROFEN 600 MG: 600 TABLET ORAL at 00:39

## 2018-09-25 RX ADMIN — CARVEDILOL 6.25 MG: 6.25 TABLET, FILM COATED ORAL at 08:47

## 2018-09-25 RX ADMIN — GABAPENTIN 400 MG: 400 CAPSULE ORAL at 08:47

## 2018-09-25 RX ADMIN — OXYCODONE HYDROCHLORIDE 5 MG: 5 TABLET ORAL at 06:24

## 2018-09-25 RX ADMIN — IBUPROFEN 600 MG: 600 TABLET ORAL at 06:24

## 2018-09-25 RX ADMIN — INSULIN HUMAN 4 UNITS: 100 INJECTION, SOLUTION PARENTERAL at 11:42

## 2018-09-25 RX ADMIN — INSULIN HUMAN 2 UNITS: 100 INJECTION, SOLUTION PARENTERAL at 00:33

## 2018-09-25 RX ADMIN — ATORVASTATIN CALCIUM 80 MG: 40 TABLET, FILM COATED ORAL at 08:47

## 2018-09-25 RX ADMIN — ACETAMINOPHEN 650 MG: 325 TABLET, FILM COATED ORAL at 06:17

## 2018-09-25 RX ADMIN — INSULIN HUMAN 4 UNITS: 100 INJECTION, SOLUTION PARENTERAL at 08:46

## 2018-09-25 RX ADMIN — OXYCODONE HYDROCHLORIDE 5 MG: 5 TABLET ORAL at 00:39

## 2018-09-26 LAB
CYTO UR: NORMAL
LAB AP CASE REPORT: NORMAL
LAB AP CLINICAL INFORMATION: NORMAL
Lab: NORMAL
PATH REPORT.FINAL DX SPEC: NORMAL
PATH REPORT.GROSS SPEC: NORMAL

## 2018-09-27 ENCOUNTER — TELEPHONE (OUTPATIENT)
Dept: GYNECOLOGIC ONCOLOGY | Facility: CLINIC | Age: 48
End: 2018-09-27

## 2018-09-27 NOTE — TELEPHONE ENCOUNTER
----- Message from Kianna Mims MD sent at 9/27/2018 11:49 AM EDT -----  pls call pt to notify her of benign path  Thanks!    ----- Message -----  From: Lab, Background User  Sent: 9/26/2018   4:14 PM  To: Kianna Mims MD

## 2018-10-01 ENCOUNTER — TELEPHONE (OUTPATIENT)
Dept: GYNECOLOGIC ONCOLOGY | Facility: CLINIC | Age: 48
End: 2018-10-01

## 2018-10-01 NOTE — TELEPHONE ENCOUNTER
Pt returned call to office, I informed her of results, pt v/u, has appt scheduled for post op with Dr. Mims, will call before if needed.

## 2018-10-01 NOTE — TELEPHONE ENCOUNTER
Kianna Mims MD  [facility]             pls call pt to notify her of benign path   Thanks!      Phoned pt, left message with family member for her to call.

## 2018-10-11 ENCOUNTER — OFFICE VISIT (OUTPATIENT)
Dept: GYNECOLOGIC ONCOLOGY | Facility: CLINIC | Age: 48
End: 2018-10-11

## 2018-10-11 VITALS
RESPIRATION RATE: 18 BRPM | TEMPERATURE: 97.7 F | WEIGHT: 216 LBS | HEART RATE: 87 BPM | DIASTOLIC BLOOD PRESSURE: 84 MMHG | SYSTOLIC BLOOD PRESSURE: 184 MMHG | OXYGEN SATURATION: 97 % | BODY MASS INDEX: 35.94 KG/M2

## 2018-10-11 DIAGNOSIS — Z98.890 POST-OPERATIVE STATE: Primary | ICD-10-CM

## 2018-10-11 PROCEDURE — 99024 POSTOP FOLLOW-UP VISIT: CPT | Performed by: OBSTETRICS & GYNECOLOGY

## 2018-10-11 NOTE — PROGRESS NOTES
Hector Nugent  [id number]  1970      Reason for Visit:  Postoperative evaluation    History of Present Illness:  Patient is a very pleasant 48 y.o. woman who presents for a post operative evaluation status post TOTAL ABDOMINAL HYSTERECTOMY BILATERAL SALPINGO OOPHORECTOMY, APPENDECTOMY performed on 9/24/18.      Surgery and hospital course were uncomplicated.  Today, patient notes normal bowel and bladder function.  Her pain is well controlled. She has questions about resuming normal activities.     Past Medical History, Past Surgical History, Social History, Family History have been reviewed and are without significant changes except as mentioned.    Review of Systems   All other systems were reviewed and are negative except as mentioned above.    Medications:  The current medication list was reviewed in the EMR    ALLERGIES:    Allergies   Allergen Reactions   • Codeine Nausea And Vomiting   • Sulfa Antibiotics Nausea And Vomiting   • Contrast Dye Rash           BP (!) 184/84   Pulse 87   Temp 97.7 °F (36.5 °C) (Temporal Artery )   Resp 18   Wt 98 kg (216 lb)   SpO2 97%   BMI 35.94 kg/m²        Physical Exam  Constitutional:  Patient is a pleasant woman in no acute distress.  Gastrointestinal: Abdomen is soft and appropriately tender.  There is no mass palpated.  There is no rebound or guarding.  Incision is clean, dry and intact.  Extremities:  Bilateral lower extremities are non-tender. Patient has sores on arms and legs (reports it is from bug bites)  Gynecologic:GYNECOLOGIC:  External genitalia are free from lesion. On speculum examination, the vaginal cuff was intact and no lesions were appreciated.  On bimanual examination, no fullness was appreciated.  Uterus, cervix and adnexa were absent.  There was no significant tenderness.  Rectovaginal exam was deferred.      PATHOLOGY:  Tissue Pathology Exam   Order: [id number]   Status:  Final result   Visible to patient:  No (Not Released) Dx:   Generalized abdominal mass   Component 2wk ago   Clinical Information    The working history is ovarian mass.    Final Diagnosis   1. 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  Unremarkable fimbriated fallopian tube.  2. UTERUS, HYSTERECTOMY AND RIGHT SALPINGO-OOPHORECTOMY:  Benign ectocervical and endocervical mucosa with nabothian cysts.  Benign proliferative phase endometrium, negative for hyperplasia, atypia, or malignancy.   Right ovary with a benign corpus luteal cyst.  Unremarkable fimbriated fallopian tube.  3. APPENDIX:  Unremarkable appendix with fibrous obliteration of the lumen and focal minimal acute inflammation.   Negative for dysplasia or malignancy.     JTA/mbc    Electronically signed by Jakob Hernandez DO on 9/26/2018 at 1614   Intraoperative Consultation    Frozen section: Verbal report given to Dr. Mims via speakerphone on 9/24/2018 at 1:27 PM.     FROZEN SECTION DIAGNOSIS:  Benign mucinous cystadenoma and mature cystic teratoma (JTA).               ASSESSMENT/PLAN:  Hector Nugent returns for a post-operative evaluation today.  All pathology reports were given to patient.      Overall, the patient is very pleased with her care.  I recommended continuation of post operative precautions as discussed.    Patient had a question about additional pain medicine. Discussed rare to need opioid medication at this time. Ibuprofen and Tylenol can be bought OTC. Bc was reviewed and showed patient has received multiple narcotic scripts leonel-operative. Please refer to scanned in Bc. No more narcotics to be given to patient.     She is to follow up on an as needed basis.    Note: Speech recognition transcription software was used to dictate portions of this document.  An attempt at proofreading has been made though minor errors in transcription  may still be present.  Please do not hesitate to call our office with any questions.    Kianna Mims MD

## 2019-02-20 ENCOUNTER — TELEPHONE (OUTPATIENT)
Dept: SURGERY | Facility: CLINIC | Age: 49
End: 2019-02-20

## 2019-02-20 NOTE — TELEPHONE ENCOUNTER
Pt did not show for appt , called left message for pt to call back to reschedule called # [phone] also called [phone] no answer no voice mail also called [phone] left message

## 2019-05-02 ENCOUNTER — OFFICE VISIT (OUTPATIENT)
Dept: SURGERY | Facility: CLINIC | Age: 49
End: 2019-05-02

## 2019-05-02 VITALS
SYSTOLIC BLOOD PRESSURE: 120 MMHG | HEIGHT: 65 IN | HEART RATE: 85 BPM | DIASTOLIC BLOOD PRESSURE: 74 MMHG | OXYGEN SATURATION: 96 % | TEMPERATURE: 96.8 F | WEIGHT: 209 LBS | BODY MASS INDEX: 34.82 KG/M2

## 2019-05-02 DIAGNOSIS — K43.2 INCISIONAL HERNIA, WITHOUT OBSTRUCTION OR GANGRENE: Primary | ICD-10-CM

## 2019-05-02 PROCEDURE — 99204 OFFICE O/P NEW MOD 45 MIN: CPT | Performed by: SURGERY

## 2019-05-02 RX ORDER — SITAGLIPTIN 100 MG/1
100 TABLET, FILM COATED ORAL DAILY
COMMUNITY
Start: 2019-04-19 | End: 2022-03-15
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
## 2019-05-06 ENCOUNTER — TELEPHONE (OUTPATIENT)
Dept: SURGERY | Facility: CLINIC | Age: 49
End: 2019-05-06

## 2019-05-06 ENCOUNTER — DOCUMENTATION (OUTPATIENT)
Dept: PREADMISSION TESTING | Facility: HOSPITAL | Age: 49
End: 2019-05-06

## 2019-05-06 ENCOUNTER — APPOINTMENT (OUTPATIENT)
Dept: PREADMISSION TESTING | Facility: HOSPITAL | Age: 49
End: 2019-05-06

## 2019-05-06 VITALS
DIASTOLIC BLOOD PRESSURE: 81 MMHG | OXYGEN SATURATION: 97 % | HEIGHT: 65 IN | BODY MASS INDEX: 34.82 KG/M2 | HEART RATE: 81 BPM | WEIGHT: 209 LBS | SYSTOLIC BLOOD PRESSURE: 138 MMHG | RESPIRATION RATE: 16 BRPM

## 2019-05-06 LAB
ANION GAP SERPL CALCULATED.3IONS-SCNC: 16.2 MMOL/L (ref 10–20)
BUN BLD-MCNC: 14 MG/DL (ref 7–20)
BUN/CREAT SERPL: 23.3 (ref 7.1–23.5)
CALCIUM SPEC-SCNC: 8.9 MG/DL (ref 8.4–10.2)
CHLORIDE SERPL-SCNC: 100 MMOL/L (ref 98–107)
CO2 SERPL-SCNC: 29 MMOL/L (ref 26–30)
CREAT BLD-MCNC: 0.6 MG/DL (ref 0.6–1.3)
DEPRECATED RDW RBC AUTO: 38.7 FL (ref 37–54)
ERYTHROCYTE [DISTWIDTH] IN BLOOD BY AUTOMATED COUNT: 11.9 % (ref 12.3–15.4)
GFR SERPL CREATININE-BSD FRML MDRD: 106 ML/MIN/1.73
GFR SERPL CREATININE-BSD FRML MDRD: 128 ML/MIN/1.73
GLUCOSE BLD-MCNC: 112 MG/DL (ref 74–98)
HCT VFR BLD AUTO: 41.9 % (ref 34–46.6)
HGB BLD-MCNC: 13.9 G/DL (ref 12–15.9)
MCH RBC QN AUTO: 29.3 PG (ref 26.6–33)
MCHC RBC AUTO-ENTMCNC: 33.2 G/DL (ref 31.5–35.7)
MCV RBC AUTO: 88.2 FL (ref 79–97)
PLATELET # BLD AUTO: 220 10*3/MM3 (ref 140–450)
PMV BLD AUTO: 10.3 FL (ref 6–12)
POTASSIUM BLD-SCNC: 4.2 MMOL/L (ref 3.5–5.1)
RBC # BLD AUTO: 4.75 10*6/MM3 (ref 3.77–5.28)
SODIUM BLD-SCNC: 141 MMOL/L (ref 137–145)
WBC NRBC COR # BLD: 11.77 10*3/MM3 (ref 3.4–10.8)

## 2019-05-06 PROCEDURE — 85027 COMPLETE CBC AUTOMATED: CPT | Performed by: SURGERY

## 2019-05-06 PROCEDURE — 80048 BASIC METABOLIC PNL TOTAL CA: CPT | Performed by: SURGERY

## 2019-05-06 PROCEDURE — 93005 ELECTROCARDIOGRAM TRACING: CPT | Performed by: SURGERY

## 2019-05-06 PROCEDURE — 36415 COLL VENOUS BLD VENIPUNCTURE: CPT

## 2019-05-06 NOTE — PAT
"DURING PRE ADMISSION TESTING, PATIENTS EKG PRESENTED AS NORMAL. HOWEVER, PATIENT HAS A HISTORY OF TWO PAST MI'S, HAS BLOCKAGES IN HER HEART AND HAS TWO CARDIAC STENTS. PATIENT HAS NOT BEEN COMPLIANT WITH SEEING DR. ARRIAZA FOR HER CARDIAC HISTORY. HOWEVER, PATIENT STATES SHE HAS SINCE LOST A LOT OF WEIGHT AND HAS NOT HAD ANY CHEST PAINS OR SHORTNESS OF BREATH. DUE TO HER HISTORY, THIS WRITER CONTACTED AUBREY SCHMITZ CRNA WHO STATED PATIENT DOES IN FACT STILL NEED A CARDIAC CLEARANCE. STACY FROM DR. GALVAN'S OFFICE NOTIFIED OF THIS. MEDICAL RELEASE WAS ALSO SENT TO DR. CHATTERJEE OFFICE REQUESTING RECORDS AND NOTING THAT CARDIAC CLEARANCE WAS NEEDED. PATIENT VERB UND OF THIS AND VERB UND THAT SHE MAY NEED TO GO AND SEE DR. ARRIAZA TO OBTAIN THIS INFORMATION. PATIENT VERB UND.     PATIENT ALSO HAS SEVERE PSORIASIS ALL OVER HER BODY WITH OPEN AREAS WHERE SHE HAS SCRATCHED INCLUDING ON HER ABDOMEN AND TORSO. SHE STATES DR. GALVAN LOOKED AT IT AND IS AWARE. SHE HAS A DERMATOLOGIST APPOINTMENT IN June. AUBREY SCHMITZ CRNA ALSO NOTIFIED OF THIS SITUATION IN WHICH HE STATED \"SHOULD NOT BE A PROBLEM ESPECIALLY IF DR. GALVAN HAS SEEN THIS.\" PT INSTRUCTED TO TRY NOT TO SCRATCH AND LET THESE AREAS DRY UP AND HEAL BEFORE SURGERY. PT VERB UND.   "

## 2019-05-06 NOTE — DISCHARGE INSTRUCTIONS
PAT PASS GIVEN/REVIEWED WITH PT.  VERBALIZED UNDERSTANDING OF THE FOLLOWING:  DO NOT EAT, DRINK, SMOKE, USE SMOKELESS TOBACCO OR CHEW GUM AFTER MIDNIGHT THE NIGHT BEFORE SURGERY.  THIS ALSO INCLUDES HARD CANDIES AND MINTS.    DO NOT SHAVE THE AREA TO BE OPERATED ON AT LEAST 48 HOURS PRIOR TO THE PROCEDURE.  DO NOT WEAR MAKE UP OR NAIL POLISH.  DO NOT LEAVE IN ANY PIERCING OR WEAR JEWELRY THE DAY OF SURGERY.      DO NOT USE ADHESIVES IF YOU WEAR DENTURES.    DO NOT WEAR EYE CONTACTS; BRING IN YOUR GLASSES.    ONLY TAKE MEDICATION THE MORNING OF YOUR PROCEDURE IF INSTRUCTED BY YOUR SURGEON WITH ENOUGH WATER TO SWALLOW THE MEDICATION.  IF YOUR SURGEON DID NOT SPECIFY WHICH MEDICATIONS TO TAKE, YOU WILL NEED TO CALL THEIR OFFICE FOR FURTHER INSTRUCTIONS AND DO AS THEY INSTRUCT.    LEAVE ANYTHING YOU CONSIDER VALUABLE AT HOME.    YOU WILL NEED TO ARRANGE FOR SOMEONE TO DRIVE YOU HOME AFTER SURGERY.  IT IS RECOMMENDED THAT YOU DO NOT DRIVE, WORK, DRINK ALCOHOL OR MAKE MAJOR DECISIONS FOR AT LEAST 24 HOURS AFTER YOUR PROCEDURE IS COMPLETE.      THE DAY OF YOUR PROCEDURE, BRING IN THE FOLLOWING IF APPLICABLE:   PICTURE ID AND INSURANCE/MEDICARE OR MEDICAID CARDS   COPY OF ADVANCED DIRECTIVE/LIVING WILL/POWER OR    CPAP/BIPAP/INHALERS   SKIN PREP SHEET   YOUR PREADMISSION TESTING PASS (IF NOT A PHONE HISTORY)    Chlorhexidine wipes along with instruction/verification sheet given to pt.  Instructed pt to apply stickers from chlorhexidine wipes to verification sheet once skin prep has been  completed, and to return to [facility] the day of the procedure.  Pt. Verbalizes understanding.

## 2019-05-07 ENCOUNTER — TELEPHONE (OUTPATIENT)
Dept: SURGERY | Facility: CLINIC | Age: 49
End: 2019-05-07

## 2019-05-07 NOTE — TELEPHONE ENCOUNTER
Patient will need a cardiac clearance by Dr Car. His office stated they will call us back with an appt or Dr Car may clear her without seeing her  She was seen last September.

## 2019-05-17 ENCOUNTER — TELEPHONE (OUTPATIENT)
Dept: SURGERY | Facility: CLINIC | Age: 49
End: 2019-05-17

## 2019-05-23 ENCOUNTER — APPOINTMENT (OUTPATIENT)
Dept: PREADMISSION TESTING | Facility: HOSPITAL | Age: 49
End: 2019-05-23

## 2019-05-23 VITALS — BODY MASS INDEX: 35.16 KG/M2 | WEIGHT: 211 LBS | HEIGHT: 65 IN

## 2019-05-24 ENCOUNTER — APPOINTMENT (OUTPATIENT)
Dept: PREADMISSION TESTING | Facility: HOSPITAL | Age: 49
End: 2019-05-24

## 2019-05-30 ENCOUNTER — TELEPHONE (OUTPATIENT)
Dept: SURGERY | Facility: CLINIC | Age: 49
End: 2019-05-30

## 2019-05-30 NOTE — TELEPHONE ENCOUNTER
Called the patient to remind them of an upcoming appointment with general surgery. I was unable to reach to reach the patient. Not accepting calls on phone.

## 2019-06-03 ENCOUNTER — HOSPITAL ENCOUNTER (OUTPATIENT)
Facility: HOSPITAL | Age: 49
Setting detail: HOSPITAL OUTPATIENT SURGERY
Discharge: HOME | End: 2019-06-03
Attending: SURGERY | Admitting: SURGERY

## 2019-06-03 ENCOUNTER — ANESTHESIA EVENT (OUTPATIENT)
Dept: PERIOP | Facility: HOSPITAL | Age: 49
End: 2019-06-03

## 2019-06-03 ENCOUNTER — ANESTHESIA (OUTPATIENT)
Dept: PERIOP | Facility: HOSPITAL | Age: 49
End: 2019-06-03

## 2019-06-03 VITALS
HEART RATE: 60 BPM | SYSTOLIC BLOOD PRESSURE: 111 MMHG | DIASTOLIC BLOOD PRESSURE: 56 MMHG | TEMPERATURE: 98.2 F | RESPIRATION RATE: 18 BRPM | OXYGEN SATURATION: 94 %

## 2019-06-03 DIAGNOSIS — K43.2 INCISIONAL HERNIA, WITHOUT OBSTRUCTION OR GANGRENE: ICD-10-CM

## 2019-06-03 LAB — GLUCOSE BLDC GLUCOMTR-MCNC: 136 MG/DL (ref 70–130)

## 2019-06-03 PROCEDURE — 82962 GLUCOSE BLOOD TEST: CPT

## 2019-06-03 PROCEDURE — 25010000002 DEXAMETHASONE PER 1 MG: Performed by: NURSE ANESTHETIST, CERTIFIED REGISTERED

## 2019-06-03 PROCEDURE — S0260 H&P FOR SURGERY: HCPCS | Performed by: SURGERY

## 2019-06-03 PROCEDURE — C1781 MESH (IMPLANTABLE): HCPCS | Performed by: SURGERY

## 2019-06-03 PROCEDURE — 25010000002 PROPOFOL 200 MG/20ML EMULSION: Performed by: NURSE ANESTHETIST, CERTIFIED REGISTERED

## 2019-06-03 PROCEDURE — 49561 PR REPAIR INCISIONAL HERNIA,STRANG: CPT | Performed by: SURGERY

## 2019-06-03 PROCEDURE — 25010000002 ONDANSETRON PER 1 MG: Performed by: NURSE ANESTHETIST, CERTIFIED REGISTERED

## 2019-06-03 PROCEDURE — 49587 PR REPAIR UMBILICAL HERN,5+Y/O,STRANG: CPT | Performed by: SURGERY

## 2019-06-03 PROCEDURE — 25010000002 FENTANYL CITRATE (PF) 250 MCG/5ML SOLUTION: Performed by: NURSE ANESTHETIST, CERTIFIED REGISTERED

## 2019-06-03 PROCEDURE — 25010000002 MIDAZOLAM PER 1 MG: Performed by: NURSE ANESTHETIST, CERTIFIED REGISTERED

## 2019-06-03 PROCEDURE — 25010000002 HYDROMORPHONE 1 MG/ML SOLUTION

## 2019-06-03 PROCEDURE — 49568 PR IMPLANT MESH HERNIA REPAIR/DEBRIDEMENT CLOSURE: CPT | Performed by: SURGERY

## 2019-06-03 DEVICE — VENTRIO ST HERNIA PATCH
Type: IMPLANTABLE DEVICE | Site: ABDOMEN | Status: FUNCTIONAL
Brand: VENTRIO ST HERNIA PATCH

## 2019-06-03 RX ORDER — PROMETHAZINE HYDROCHLORIDE 25 MG/ML
6.25 INJECTION, SOLUTION INTRAMUSCULAR; INTRAVENOUS ONCE AS NEEDED
Status: DISCONTINUED | OUTPATIENT
Start: 2019-06-03 | End: 2019-06-03 | Stop reason: HOSPADM

## 2019-06-03 RX ORDER — ONDANSETRON 2 MG/ML
4 INJECTION INTRAMUSCULAR; INTRAVENOUS ONCE AS NEEDED
Status: DISCONTINUED | OUTPATIENT
Start: 2019-06-03 | End: 2019-06-03 | Stop reason: HOSPADM

## 2019-06-03 RX ORDER — MEPERIDINE HYDROCHLORIDE 50 MG/ML
12.5 INJECTION INTRAMUSCULAR; INTRAVENOUS; SUBCUTANEOUS
Status: COMPLETED | OUTPATIENT
Start: 2019-06-03 | End: 2019-06-03

## 2019-06-03 RX ORDER — ROCURONIUM BROMIDE 10 MG/ML
INJECTION, SOLUTION INTRAVENOUS AS NEEDED
Status: DISCONTINUED | OUTPATIENT
Start: 2019-06-03 | End: 2019-06-03 | Stop reason: SURG

## 2019-06-03 RX ORDER — SODIUM CHLORIDE 0.9 % (FLUSH) 0.9 %
3 SYRINGE (ML) INJECTION AS NEEDED
Status: DISCONTINUED | OUTPATIENT
Start: 2019-06-03 | End: 2019-06-03 | Stop reason: HOSPADM

## 2019-06-03 RX ORDER — HYDROCODONE BITARTRATE AND ACETAMINOPHEN 5; 325 MG/1; MG/1
1-2 TABLET ORAL EVERY 4 HOURS PRN
Qty: 24 TABLET | Refills: 0 | Status: SHIPPED | OUTPATIENT
Start: 2019-06-03 | End: 2020-06-19

## 2019-06-03 RX ORDER — FENTANYL CITRATE 50 UG/ML
INJECTION, SOLUTION INTRAMUSCULAR; INTRAVENOUS AS NEEDED
Status: DISCONTINUED | OUTPATIENT
Start: 2019-06-03 | End: 2019-06-03 | Stop reason: SURG

## 2019-06-03 RX ORDER — DEXAMETHASONE SODIUM PHOSPHATE 4 MG/ML
INJECTION, SOLUTION INTRA-ARTICULAR; INTRALESIONAL; INTRAMUSCULAR; INTRAVENOUS; SOFT TISSUE AS NEEDED
Status: DISCONTINUED | OUTPATIENT
Start: 2019-06-03 | End: 2019-06-03 | Stop reason: SURG

## 2019-06-03 RX ORDER — PROMETHAZINE HYDROCHLORIDE 25 MG/1
25 TABLET ORAL ONCE AS NEEDED
Status: DISCONTINUED | OUTPATIENT
Start: 2019-06-03 | End: 2019-06-03 | Stop reason: HOSPADM

## 2019-06-03 RX ORDER — BUPIVACAINE HYDROCHLORIDE 5 MG/ML
INJECTION, SOLUTION EPIDURAL; INTRACAUDAL AS NEEDED
Status: DISCONTINUED | OUTPATIENT
Start: 2019-06-03 | End: 2019-06-03 | Stop reason: HOSPADM

## 2019-06-03 RX ORDER — LIDOCAINE HYDROCHLORIDE 20 MG/ML
INJECTION, SOLUTION INTRAVENOUS AS NEEDED
Status: DISCONTINUED | OUTPATIENT
Start: 2019-06-03 | End: 2019-06-03 | Stop reason: SURG

## 2019-06-03 RX ORDER — MIDAZOLAM HYDROCHLORIDE 1 MG/ML
INJECTION INTRAMUSCULAR; INTRAVENOUS AS NEEDED
Status: DISCONTINUED | OUTPATIENT
Start: 2019-06-03 | End: 2019-06-03 | Stop reason: SURG

## 2019-06-03 RX ORDER — ONDANSETRON 2 MG/ML
INJECTION INTRAMUSCULAR; INTRAVENOUS AS NEEDED
Status: DISCONTINUED | OUTPATIENT
Start: 2019-06-03 | End: 2019-06-03 | Stop reason: SURG

## 2019-06-03 RX ORDER — ACETAMINOPHEN 500 MG
1000 TABLET ORAL ONCE
Status: COMPLETED | OUTPATIENT
Start: 2019-06-03 | End: 2019-06-03

## 2019-06-03 RX ORDER — MEPERIDINE HYDROCHLORIDE 50 MG/ML
INJECTION INTRAMUSCULAR; INTRAVENOUS; SUBCUTANEOUS
Status: COMPLETED
Start: 2019-06-03 | End: 2019-06-03

## 2019-06-03 RX ORDER — ACETAMINOPHEN 500 MG
1000 TABLET ORAL ONCE
Status: DISCONTINUED | OUTPATIENT
Start: 2019-06-03 | End: 2019-06-03 | Stop reason: HOSPADM

## 2019-06-03 RX ORDER — LORAZEPAM 2 MG/ML
0.5 INJECTION INTRAMUSCULAR ONCE
Status: DISCONTINUED | OUTPATIENT
Start: 2019-06-03 | End: 2019-06-03 | Stop reason: HOSPADM

## 2019-06-03 RX ORDER — SODIUM CHLORIDE, SODIUM LACTATE, POTASSIUM CHLORIDE, CALCIUM CHLORIDE 600; 310; 30; 20 MG/100ML; MG/100ML; MG/100ML; MG/100ML
1000 INJECTION, SOLUTION INTRAVENOUS CONTINUOUS
Status: DISCONTINUED | OUTPATIENT
Start: 2019-06-03 | End: 2019-06-03 | Stop reason: HOSPADM

## 2019-06-03 RX ORDER — PROMETHAZINE HYDROCHLORIDE 25 MG/1
25 SUPPOSITORY RECTAL ONCE AS NEEDED
Status: DISCONTINUED | OUTPATIENT
Start: 2019-06-03 | End: 2019-06-03 | Stop reason: HOSPADM

## 2019-06-03 RX ORDER — GLYCOPYRROLATE 0.2 MG/ML
INJECTION INTRAMUSCULAR; INTRAVENOUS AS NEEDED
Status: DISCONTINUED | OUTPATIENT
Start: 2019-06-03 | End: 2019-06-03 | Stop reason: SURG

## 2019-06-03 RX ORDER — SCOLOPAMINE TRANSDERMAL SYSTEM 1 MG/1
1 PATCH, EXTENDED RELEASE TRANSDERMAL CONTINUOUS
Status: DISCONTINUED | OUTPATIENT
Start: 2019-06-03 | End: 2019-06-03 | Stop reason: HOSPADM

## 2019-06-03 RX ORDER — CLINDAMYCIN PHOSPHATE 900 MG/50ML
900 INJECTION, SOLUTION INTRAVENOUS ONCE
Status: COMPLETED | OUTPATIENT
Start: 2019-06-03 | End: 2019-06-03

## 2019-06-03 RX ORDER — MAGNESIUM HYDROXIDE 1200 MG/15ML
LIQUID ORAL AS NEEDED
Status: DISCONTINUED | OUTPATIENT
Start: 2019-06-03 | End: 2019-06-03 | Stop reason: HOSPADM

## 2019-06-03 RX ORDER — NEOSTIGMINE METHYLSULFATE 5 MG/5 ML
SYRINGE (ML) INTRAVENOUS AS NEEDED
Status: DISCONTINUED | OUTPATIENT
Start: 2019-06-03 | End: 2019-06-03 | Stop reason: SURG

## 2019-06-03 RX ORDER — PROMETHAZINE HYDROCHLORIDE 25 MG/ML
12.5 INJECTION, SOLUTION INTRAMUSCULAR; INTRAVENOUS ONCE AS NEEDED
Status: DISCONTINUED | OUTPATIENT
Start: 2019-06-03 | End: 2019-06-03 | Stop reason: HOSPADM

## 2019-06-03 RX ORDER — ALBUTEROL SULFATE 2.5 MG/3ML
2.5 SOLUTION RESPIRATORY (INHALATION) ONCE AS NEEDED
Status: DISCONTINUED | OUTPATIENT
Start: 2019-06-03 | End: 2019-06-03 | Stop reason: HOSPADM

## 2019-06-03 RX ORDER — PROPOFOL 10 MG/ML
INJECTION, EMULSION INTRAVENOUS AS NEEDED
Status: DISCONTINUED | OUTPATIENT
Start: 2019-06-03 | End: 2019-06-03 | Stop reason: SURG

## 2019-06-03 RX ADMIN — MEPERIDINE HYDROCHLORIDE 12.5 MG: 50 INJECTION, SOLUTION INTRAMUSCULAR; INTRAVENOUS; SUBCUTANEOUS at 11:00

## 2019-06-03 RX ADMIN — Medication 4 MG: at 09:57

## 2019-06-03 RX ADMIN — ROCURONIUM BROMIDE 10 MG: 10 INJECTION INTRAVENOUS at 09:37

## 2019-06-03 RX ADMIN — LIDOCAINE HYDROCHLORIDE 60 MG: 20 INJECTION, SOLUTION INTRAVENOUS at 09:12

## 2019-06-03 RX ADMIN — HYDROMORPHONE HYDROCHLORIDE 0.5 MG: 1 INJECTION, SOLUTION INTRAMUSCULAR; INTRAVENOUS; SUBCUTANEOUS at 10:41

## 2019-06-03 RX ADMIN — DEXAMETHASONE SODIUM PHOSPHATE 4 MG: 4 INJECTION, SOLUTION INTRAMUSCULAR; INTRAVENOUS at 09:12

## 2019-06-03 RX ADMIN — PROPOFOL 140 MG: 10 INJECTION, EMULSION INTRAVENOUS at 09:12

## 2019-06-03 RX ADMIN — HYDROMORPHONE HYDROCHLORIDE 0.5 MG: 1 INJECTION, SOLUTION INTRAMUSCULAR; INTRAVENOUS; SUBCUTANEOUS at 10:23

## 2019-06-03 RX ADMIN — PROPOFOL 40 MG: 10 INJECTION, EMULSION INTRAVENOUS at 09:37

## 2019-06-03 RX ADMIN — FENTANYL CITRATE 50 MCG: 50 INJECTION, SOLUTION INTRAMUSCULAR; INTRAVENOUS at 09:37

## 2019-06-03 RX ADMIN — SODIUM CHLORIDE, POTASSIUM CHLORIDE, SODIUM LACTATE AND CALCIUM CHLORIDE 1000 ML: 600; 310; 30; 20 INJECTION, SOLUTION INTRAVENOUS at 08:44

## 2019-06-03 RX ADMIN — ONDANSETRON 4 MG: 2 INJECTION INTRAMUSCULAR; INTRAVENOUS at 09:12

## 2019-06-03 RX ADMIN — Medication 0.5 MG: at 10:41

## 2019-06-03 RX ADMIN — ROCURONIUM BROMIDE 20 MG: 10 INJECTION INTRAVENOUS at 09:12

## 2019-06-03 RX ADMIN — ACETAMINOPHEN 1000 MG: 500 TABLET, FILM COATED ORAL at 08:44

## 2019-06-03 RX ADMIN — Medication 0.5 MG: at 10:53

## 2019-06-03 RX ADMIN — HYDROMORPHONE HYDROCHLORIDE 0.5 MG: 1 INJECTION, SOLUTION INTRAMUSCULAR; INTRAVENOUS; SUBCUTANEOUS at 10:53

## 2019-06-03 RX ADMIN — MEPERIDINE HYDROCHLORIDE 12.5 MG: 50 INJECTION, SOLUTION INTRAMUSCULAR; INTRAVENOUS; SUBCUTANEOUS at 11:05

## 2019-06-03 RX ADMIN — SCOPALAMINE 1 PATCH: 1 PATCH, EXTENDED RELEASE TRANSDERMAL at 09:05

## 2019-06-03 RX ADMIN — FENTANYL CITRATE 100 MCG: 50 INJECTION, SOLUTION INTRAMUSCULAR; INTRAVENOUS at 09:12

## 2019-06-03 RX ADMIN — GLYCOPYRROLATE 0.6 MG: 0.2 INJECTION, SOLUTION INTRAMUSCULAR; INTRAVENOUS at 09:57

## 2019-06-03 RX ADMIN — MIDAZOLAM HYDROCHLORIDE 2 MG: 1 INJECTION, SOLUTION INTRAMUSCULAR; INTRAVENOUS at 09:12

## 2019-06-03 RX ADMIN — Medication 0.5 MG: at 10:23

## 2019-06-03 RX ADMIN — CLINDAMYCIN PHOSPHATE 900 MG: 900 INJECTION, SOLUTION INTRAVENOUS at 09:22

## 2019-06-03 NOTE — DISCHARGE INSTRUCTIONS
Please follow all post op instructions and follow up appointment time from your physician's office included in your discharge packet.    No pushing, pulling, tugging,  heavy lifting, or strenuous activity.  No major decision making, driving, or drinking alcoholic beverages for 24 hours. ( due to the medications you have  received)  Always use good hand hygiene/washing techniques.  NO driving while taking pain medications.    Use your ice pack as instructed, do not use continuously.    Follow your physicians instructions as previously directed.    To assist you in voiding:  Drink plenty of fluids  Listen to running water while attempting to void.    If you are unable to urinate and you have an uncomfortable urge to void or it has been   6 hours since you were discharged, return to the Emergency Room

## 2019-06-03 NOTE — ANESTHESIA PROCEDURE NOTES
Airway  Urgency: elective    Difficult airway    General Information and Staff    Patient location during procedure: OR  CRNA: Krishan Gandhi CRNA    Indications and Patient Condition  Indications for airway management: airway protection    Preoxygenated: yes  MILS maintained throughout  Mask difficulty assessment: 2 - vent by mask + OA or adjuvant +/- NMBA    Final Airway Details  Final airway type: endotracheal airway      Successful airway: ETT  Cuffed: yes   Successful intubation technique: direct laryngoscopy  Facilitating devices/methods: intubating stylet, anterior pressure/BURP, OPA, cricoid pressure and Bougie  Endotracheal tube insertion site: oral  Blade: Aisha (second attempt with VL)  Blade size: 3  ETT size (mm): 7.5  Cormack-Lehane Classification: grade III - view of epiglottis only  Placement verified by: chest auscultation and capnometry   Cuff volume (mL): 6  Measured from: teeth  ETT to teeth (cm): 21  Number of attempts at approach: 1    Additional Comments  DIFFICULT INTUBATION. Pt very anterior resulting in a Grade III view. Bougie attempted without success. Video laryngoscope ( VL) utilized for second attempt. Second attempt successful and non-traumatic.. Dentition and lips as noted on pre-induction. ETT cuff inflated to minimal occlusive pressure. ETT secured.

## 2019-06-03 NOTE — ANESTHESIA POSTPROCEDURE EVALUATION
Patient: Hector Nugent    Procedure Summary     Date:  06/03/19 Room / Location:  [facility] OR  / [facility] OR    Anesthesia Start:  0910 Anesthesia Stop:  1011    Procedure:  UMBILLICAL HERNIAL REPAIR AND INCISIONAL HERNIA REPAIR WITH MESH (N/A Abdomen) Diagnosis:       Incisional hernia, without obstruction or gangrene      (Incisional hernia, without obstruction or gangrene [K43.2])    Surgeon:  Saeed Jackman MD Provider:  Krishan Gandhi CRNA    Anesthesia Type:  general ASA Status:  3          Anesthesia Type: general  Last vitals  BP   127/66 @ 1013 6/3/2019   Temp 97.4   Pulse 62   Resp 20   SpO2 96%     Post Anesthesia Care and Evaluation    Patient location during evaluation: PACU  Patient participation: complete - patient participated  Level of consciousness: awake and sleepy but conscious  Pain management: adequate  Airway patency: patent  Anesthetic complications: No anesthetic complications  PONV Status: none  Cardiovascular status: acceptable  Respiratory status: acceptable, spontaneous ventilation, oral airway and face mask  Hydration status: acceptable
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
## 2019-06-03 NOTE — BRIEF OP NOTE
PATIENT:    Hector Nugent    DATE OF SURGERY:   6/3/2019    PHYSICIAN:    Saeed Jackman MD    REFERRING PHYSICIAN:  Criss Nazario, ARETHA    YOB: 1970    PREOPERATIVE DIAGNOSIS:  Incarcerated incisional hernia    POSTOPERATIVE DIAGNOSIS:  Incarcerated incisional hernia    PROCEDURE: #1.  Incisional herniorraphy with reduction of incarceration       Placement of medium oval dual sided Ventrio Bard mesh                             #2.  Repair incarcerated umbilical hernia  HISTORY:  The patient presents to me for evaluation and treatment of a history significant for a history significant for incisional hernia.  They are here now today for elective repair.    ANESTHESIA:  General endotracheal.    OPERATIVE PROCEDURE:  The patient was taken to the operating room, placed in the supine position, and given general endotracheal anesthesia per the Anesthesia service.  The patient was prepped and draped in the normal sterile fashion and the patient was given preoperative IV antibiotics.  An appropriate timeout per the nursing staff was performed prior to the incision.  I did meet with the patient preoperatively and marked them accordingly.    An incision was made on the anterior abdominal wall over the area of marking, this was sharply dissected down to the hernia sac itself which was bluntly dissected free. We were able to enter the hernia sac without difficulty, there was evidence of incarceration noted and this was reduced without difficulty. I was able to bluntly dissect some chronic attachments to the anterior abdominal wall around the hernia sac and felt I had excellent exposure performed.  Also identified an umbilical hernia 2 cm above the incisional hernia that is incarcerated omentum.  I reduced and excised the hernia sac.  The hernia was repaired primarily with Prolene suture.    I then placed a medium oval piece of Ventrio dual sided Marlex mesh and sutured it in place accordingly and a  multi-quadrant fashion with 0 Prolene suture. I felt I had excellent hernia repair and the defect was clearly covered.    Closure was then attended to with a 3-0 Vicryl suture on the wound and then the skin was closed with 4-0 Vicryl.  Pressure type dressing was applied. Hemostasis was previously intact.    The patient was stable at this point in time and subsequently transferred back to the recovery room in stable condition.    Saeed Jackman MD

## 2019-06-03 NOTE — ANESTHESIA PREPROCEDURE EVALUATION
Anesthesia Evaluation     Patient summary reviewed and Nursing notes reviewed   history of anesthetic complications: PONV  NPO Solid Status: > 8 hours  NPO Liquid Status: > 8 hours           Airway   Mallampati: II  TM distance: >3 FB  Neck ROM: full  No difficulty expected  Dental    (+) poor dentition        Pulmonary - normal exam   (+) a smoker Current Abstained day of surgery, sleep apnea (does not use cpap),   Cardiovascular - normal exam  Exercise tolerance: good (4-7 METS)    ECG reviewed  Patient on routine beta blocker and Beta blocker given within 24 hours of surgery    (+) hypertension well controlled 2 medications or greater, past MI  >12 months, CAD, cardiac stents more than 12 months ago hyperlipidemia,     ROS comment: 9/2018 Stress test    Technically poor study due to significant gut attenuation artifact  1) Moderate perfusion abnormality linsey apical segment with reversibility noted   2) Unable to intepret inferior wall   3) Normal LV systolic function ( EF 57% ) with normal LVedV   4) Inferior wall hypokinesis     ECG 5/2019 NSR    Neuro/Psych  (+) psychiatric history Anxiety,     GI/Hepatic/Renal/Endo    (+) obesity,   diabetes mellitus type 2,     Musculoskeletal     Abdominal   (+) obese,    Substance History - negative use     OB/GYN negative ob/gyn ROS         Other   (+) arthritis     ROS/Med Hx Other: lujan-clearance                  Anesthesia Plan    ASA 3     general   (Risks and benefits of general anesthesia discussed with patient, including, aspiration, recall, dental damage, cardiac or respiratory compromise, stroke, fluctuations in blood pressure, seizure or death.     Pt advised that a endotracheal tube (ETT), laryngeal mask airway (LMA) or mask would be utilized to maintain the airway. Pt verbalized understanding and agreed to plan.    Tylenol 1 gm PO,   Scopolamine patch)  intravenous induction   Anesthetic plan, all risks, benefits, and alternatives have been provided,  discussed and informed consent has been obtained with: patient.

## 2019-06-05 NOTE — OP NOTE
PATIENT:    Hector Nugent    DATE OF SURGERY:   6/3/2019    PHYSICIAN:    Saeed Jackman MD    REFERRING PHYSICIAN:  Criss Nazario, ARETHA    YOB: 1970    PREOPERATIVE DIAGNOSIS:  Incarcerated incisional hernia    POSTOPERATIVE DIAGNOSIS:  Incarcerated incisional hernia    PROCEDURE:  Incisional herniorraphy with reduction of incarceration           HISTORY:  The patient presents to me for evaluation and treatment of a history significant for a history significant for incisional hernia.  They are here now today for elective repair.    ANESTHESIA:  General endotracheal.    OPERATIVE PROCEDURE:  The patient was taken to the operating room, placed in the supine position, and given general endotracheal anesthesia per the Anesthesia service.  The patient was prepped and draped in the normal sterile fashion and the patient was given preoperative IV antibiotics.  An appropriate timeout per the nursing staff was performed prior to the incision.  I did meet with the patient preoperatively and marked them accordingly.    An incision was made on the anterior abdominal wall over the area of marking, this was sharply dissected down to the hernia sac itself which was bluntly dissected free. We were able to enter the hernia sac without difficulty, there was evidence of incarceration noted and this was reduced without difficulty. I was able to bluntly dissect some chronic attachments to the anterior abdominal wall around the hernia sac and felt I had excellent exposure performed.    I repair the hernia primarily, no need for mesh.. I felt I had excellent hernia repair and the defect was clearly covered.    Closure was then attended to with a 3-0 Vicryl suture on the wound and then the skin was closed with 4-0 Vicryl.  Pressure type dressing was applied. Hemostasis was previously intact.    The patient was stable at this point in time and subsequently transferred back to the recovery room in stable  condition.    Saeed Jackman MD

## 2019-06-05 NOTE — OP NOTE
PATIENT:    Hector Nugent    DATE OF SURGERY:   6/3/2019    PHYSICIAN:    Saeed Jackman MD    REFERRING PHYSICIAN:  Criss Nazario APRN    YOB: 1970    PREOPERATIVE DIAGNOSIS:  Incarcerated incisional hernia    POSTOPERATIVE DIAGNOSIS:  Incarcerated incisional hernia, incarcerated umbilical hernia    PROCEDURE: #1.  Incisional herniorraphy with reduction of incarceration       Placement of medium oval dual sided Ventrio Bard mesh                             #2.  Repair incarcerated umbilical hernia  HISTORY:  The patient presents to me for evaluation and treatment of a history significant for a history significant for incisional hernia.  They are here now today for elective repair.    ANESTHESIA:  General endotracheal.    OPERATIVE PROCEDURE:  The patient was taken to the operating room, placed in the supine position, and given general endotracheal anesthesia per the Anesthesia service.  The patient was prepped and draped in the normal sterile fashion and the patient was given preoperative IV antibiotics.  An appropriate timeout per the nursing staff was performed prior to the incision.  I did meet with the patient preoperatively and marked them accordingly.    An incision was made on the anterior abdominal wall over the area of marking, this was sharply dissected down to the hernia sac itself which was bluntly dissected free. We were able to enter the hernia sac without difficulty, there was evidence of incarceration noted and this was reduced without difficulty. I was able to bluntly dissect some chronic attachments to the anterior abdominal wall around the hernia sac and felt I had excellent exposure performed.  A second hernia was identified above the previous hernia.  It was an incarcerated umbilical hernia that was repaired primarily with Prolene suture.    I then placed a medium oval piece of Ventrio dual sided Marlex mesh and sutured it in place accordingly and a multi-quadrant  fashion with 0 Prolene suture. I felt I had excellent hernia repair and the defect was clearly covered.    Closure was then attended to with a 3-0 Vicryl suture on the wound and then the skin was closed with 4-0 Vicryl.  Pressure type dressing was applied. Hemostasis was previously intact.    The patient was stable at this point in time and subsequently transferred back to the recovery room in stable condition.    Saeed Jackman MD

## 2019-06-07 LAB
LAB AP CASE REPORT: NORMAL
PATH REPORT.FINAL DX SPEC: NORMAL

## 2019-06-13 ENCOUNTER — OFFICE VISIT (OUTPATIENT)
Dept: SURGERY | Facility: CLINIC | Age: 49
End: 2019-06-13

## 2019-06-13 VITALS
SYSTOLIC BLOOD PRESSURE: 155 MMHG | HEART RATE: 93 BPM | DIASTOLIC BLOOD PRESSURE: 91 MMHG | WEIGHT: 210.98 LBS | TEMPERATURE: 97.8 F | HEIGHT: 65 IN | OXYGEN SATURATION: 99 % | BODY MASS INDEX: 35.15 KG/M2

## 2019-06-13 DIAGNOSIS — Z48.89 POSTOPERATIVE VISIT: Primary | ICD-10-CM

## 2019-06-13 PROCEDURE — 99024 POSTOP FOLLOW-UP VISIT: CPT | Performed by: SURGERY

## 2019-06-13 NOTE — PROGRESS NOTES
"Patient: Hector Nugent    YOB: 1970    Date: 06/13/2019    Primary Care Provider: Criss Nazario APRN    Reason for Consultation: Follow-up hernia    Chief Complaint   Patient presents with   • Post-op Hernia       History of present illness:  I saw the patient in the office today as a followup from their recent hernia repair.  They state that they have done well and are having no complaints.    The following portions of the patient's history were reviewed and updated as appropriate: allergies, current medications, past family history, past medical history, past social history, past surgical history and problem list.    Vital Signs:   Vitals:    06/13/19 1124   BP: 155/91   Pulse: 93   Temp: 97.8 °F (36.6 °C)   TempSrc: Temporal   SpO2: 99%   Weight: 95.7 kg (210 lb 15.7 oz)   Height: 165.1 cm (65\")       Physical Exam:   General Appearance:    Alert, cooperative, in no acute distress   Abdomen:     no masses, no organomegaly, soft non-tender, non-distended, no guarding, wounds are well healed, no evidence of recurrent hernia         Assessment / Plan:    1. Postoperative visit        I did discuss the situation with the patient today in the office and they have done well from their recent herniorraphy.  I have released the patient back to normal activity, they understand that they need to be careful about heavy lifting.  I need to see the patient back in the office only if they are having further problems, they know to call me if they are.    Electronically signed by Saeed Jackman MD  06/13/19      Portions of this note have been scribed for Saeed Jackman MD by Michelle Milligan. 6/13/2019  12:23 PM              "

## 2019-08-16 ENCOUNTER — HOSPITAL ENCOUNTER (OUTPATIENT)
Facility: HOSPITAL | Age: 49
Discharge: HOME | End: 2019-08-16
Payer: MEDICAID

## 2019-08-16 PROCEDURE — 99001 SPECIMEN HANDLING PT-LAB: CPT

## 2019-10-16 ENCOUNTER — HOSPITAL ENCOUNTER (OUTPATIENT)
Dept: GENERAL RADIOLOGY | Facility: HOSPITAL | Age: 49
Discharge: HOME | End: 2019-10-16
Payer: MEDICAID

## 2019-10-16 ENCOUNTER — HOSPITAL ENCOUNTER (OUTPATIENT)
Facility: HOSPITAL | Age: 49
Discharge: HOME | End: 2019-10-16
Payer: MEDICAID

## 2019-10-16 DIAGNOSIS — M25.571 PAIN AND SWELLING OF ANKLE, RIGHT: ICD-10-CM

## 2019-10-16 DIAGNOSIS — M25.471 PAIN AND SWELLING OF ANKLE, RIGHT: ICD-10-CM

## 2019-10-16 DIAGNOSIS — M25.462 PAIN AND SWELLING OF LEFT KNEE: ICD-10-CM

## 2019-10-16 DIAGNOSIS — M25.562 PAIN AND SWELLING OF LEFT KNEE: ICD-10-CM

## 2019-10-16 PROCEDURE — 73562 X-RAY EXAM OF KNEE 3: CPT

## 2020-03-06 ENCOUNTER — HOSPITAL ENCOUNTER (OUTPATIENT)
Dept: MAMMOGRAPHY | Facility: HOSPITAL | Age: 50
Discharge: HOME | End: 2020-03-06
Payer: MEDICAID

## 2020-03-06 PROCEDURE — 77067 SCR MAMMO BI INCL CAD: CPT

## 2020-03-19 ENCOUNTER — HOSPITAL ENCOUNTER (OUTPATIENT)
Dept: ULTRASOUND IMAGING | Facility: HOSPITAL | Age: 50
Discharge: HOME | End: 2020-03-19
Payer: MEDICAID

## 2020-03-19 ENCOUNTER — HOSPITAL ENCOUNTER (OUTPATIENT)
Dept: MAMMOGRAPHY | Facility: HOSPITAL | Age: 50
Discharge: HOME | End: 2020-03-19
Payer: MEDICAID

## 2020-03-19 PROCEDURE — 76642 ULTRASOUND BREAST LIMITED: CPT

## 2020-03-19 PROCEDURE — 77066 DX MAMMO INCL CAD BI: CPT

## 2020-05-20 ENCOUNTER — TELEPHONE (OUTPATIENT)
Dept: CARDIOLOGY | Facility: CLINIC | Age: 50
End: 2020-05-20

## 2020-05-20 NOTE — TELEPHONE ENCOUNTER
REFERRED BY SHAILESH CARIAS  PH. [phone] EXT 2008 (ALL REC REQ 5/20)     *PREVIOUS CARDIOLOGIST: DR ARRIAZA [facility] 2017 AND 1 SCANNED DOC 9/18/18, [place] 2015  *TESTING: EPIC

## 2020-06-12 NOTE — PROGRESS NOTES
[facility] AT [address], [address]  [place], [place], [address] [phone]  WWW.[facility]           OUTPATIENT CLINIC CONSULTATION NOTE    Patient care team:  Patient Care Team:  Criss Nazario APRN as PCP - General (Family Medicine)  Saeed Jackman MD as Consulting Physician (General Surgery)    Requesting Provider and Reason for consultation: The patient is being seen today at the request of ARETHA Mandujano for CAD.     Subjective:   Chief complaint:   Chief Complaint   Patient presents with   • Advice Only       HPI:    Hector Nugent is a 49 y.o. female.  Cardiac problem list:  1. Coronary artery disease:  a. Non-ST elevation MI, 08/16/2012.  Left heart catheterization, Dr. Rojelio Car:  EF 25% to 30%.  Distal LAD stented with Resolute drug-eluting stent and in the proximal a VeriFlex 4 x 24 mm stent. Diffuse disease in the RPL.\  b. Cardiac catheterization 1/2017 Dr. Car: LAD proximal 50%, mid 100% felt to open, diagonal 50% proximal and mid, % status post synergy 2.5 x 18 mm meter, RCA with diffuse disease in the LADAN  2.  Ischemic cardiomyopathy:   a. Echocardiogram, 08/17/2012:  EF 30%  to 35%.  b. TTE 1/2017: EF 40 to 45%  3. Hypertension.   4. Hyperlipidemia.  5. Diabetes mellitus  type 2.   6. Obesity, BMI 36.6.   7. Tobacco abuse.   8. COPD.   9. Obstructive sleep apnea.   10. Surgical history:  a.  Hernia repair.  b. Unspecified knee surgery x3.      She presents today for consultation.  He has previously been followed by Dr. Car, but is wishing to transition her care to a cardiologist in [place].  She feels that will help make her more compliant.  She denies any chest pain, shortness of breath, palpitations, lightheadedness, syncope, orthopnea, or PND.  She does have dependent lower extremity edema that improves with elevation.  She continues to smoke 1 to 1-1/2 packs/day.  She has not been very active recently due to losing her job.   She notes that both her father and her brother had MIs in their 40s.  Denies alcohol or drug use.    Review of Systems:  Positive for lower extremity edema  Negative for exertional chest pain, dyspnea with exertion, orthopnea, PND, palpitations, lightheadedness, syncope.  All other systems reviewed and are negative.    PFSH:  Patient Active Problem List   Diagnosis   • H/O acute myocardial infarction   • Type 2 diabetes mellitus with complication, without long-term current use of insulin (CMS/[facility])   • Tobacco abuse   • Severe obesity (BMI 35.0-39.9) with comorbidity (CMS/HCC)   • Incisional hernia, without obstruction or gangrene         Current Outpatient Medications:   •  amLODIPine (NORVASC) 5 MG tablet, Take 5 mg by mouth Daily., Disp: , Rfl:   •  aspirin 81 MG chewable tablet, Chew 81 mg Daily. DNS PER DR. ROWLEY, Disp: , Rfl:   •  atorvastatin (LIPITOR) 10 MG tablet, Take 80 mg by mouth Daily., Disp: , Rfl:   •  carvedilol (COREG) 6.25 MG tablet, Take 6.25 mg by mouth 2 (Two) Times a Day With Meals., Disp: , Rfl:   •  clopidogrel (PLAVIX) 75 MG tablet, Take 75 mg by mouth Daily., Disp: , Rfl:   •  cyclobenzaprine (FLEXERIL) 10 MG tablet, Take 10 mg by mouth 3 (Three) Times a Day As Needed., Disp: , Rfl:   •  gabapentin (NEURONTIN) 300 MG capsule, Take 400 mg by mouth 3 (Three) Times a Day., Disp: , Rfl:   •  JANUVIA 100 MG tablet, Take 100 mg by mouth Daily., Disp: , Rfl:   •  lisinopril (PRINIVIL,ZESTRIL) 20 MG tablet, Take 20 mg by mouth Daily., Disp: , Rfl:   •  metFORMIN (GLUCOPHAGE) 500 MG tablet, Take  by mouth 3 (Three) Times a Day. Patient takes 1/2 pill in am, 1 pill at night., Disp: , Rfl:   •  sertraline (ZOLOFT) 25 MG tablet, Take 50 mg by mouth Daily., Disp: , Rfl:     Allergies   Allergen Reactions   • Codeine Nausea And Vomiting   • Contrast Dye Rash   • Sulfa Antibiotics Nausea And Vomiting       Social History     Socioeconomic History   • Marital status:      Spouse name: Not on  "file   • Number of children: Not on file   • Years of education: Not on file   • Highest education level: Not on file   Tobacco Use   • Smoking status: Current Every Day Smoker     Packs/day: 1.00     Years: 35.00     Pack years: 35.00     Types: Cigarettes   • Smokeless tobacco: Never Used   Substance and Sexual Activity   • Alcohol use: No   • Drug use: No   • Sexual activity: Yes     Partners: Male     Birth control/protection: None     Family History   Problem Relation Age of Onset   • Heart disease Mother    • Hypertension Mother    • Hyperlipidemia Mother    • Heart attack Father    • Heart attack Brother          Objective:   Physical Exam:  /94 (BP Location: Left arm, Patient Position: Sitting)   Pulse 74   Ht 165.1 cm (65\")   Wt 100 kg (220 lb 9.6 oz)   LMP 09/03/2018   SpO2 99%   BMI 36.71 kg/m²   CONSTITUTIONAL: Well-nourished. In no acute distress.   SKIN: Warm and dry. No rashes noted  HEENT: Head is normocephalic and atraumatic. Pupils are equal and reactive to light bilaterally. Mucous membranes are pink and moist.   NECK: Supple without masses or thyromegaly. There is no jugular venous distention at 30°.  LUNGS: Normal effort. Clear to auscultation bilaterally without wheezing, rhonchi, or rales noted.   CARDIOVASCULAR: Regular rate with a normal S1 and S2. There is no murmur, gallop, rub, or click appreciated. Carotid upstrokes are 2+ and symmetrical without bruits. There is no peripheral edema.  Left radial 2+, right ulnar 2+.  Dorsalis pedis pulse 2+ left, 2+ right  ABDOMEN: Normal bowel sounds.  Soft and nondistended. No tenderness with palpitation.   MUSCULOSKELETAL:  No digital cyanosis  NEUROLOGICAL: Nonfocal.  PSYCHIATRIC: Alert, orientated x 3, appropriate affect     Labs:  BUN   Date Value Ref Range Status   05/06/2019 14 7 - 20 mg/dL Final     Creatinine   Date Value Ref Range Status   05/06/2019 0.60 0.60 - 1.30 mg/dL Final     Potassium   Date Value Ref Range Status   "   05/06/2019 4.2 3.5 - 5.1 mmol/L Final     ALT (SGPT)   Date Value Ref Range Status   09/23/2018 27 7 - 40 U/L Final     AST (SGOT)   Date Value Ref Range Status   09/23/2018 19 0 - 33 U/L Final     WBC   Date Value Ref Range Status   05/06/2019 11.77 (H) 3.40 - 10.80 10*3/mm3 Final     Hemoglobin   Date Value Ref Range Status   05/06/2019 13.9 12.0 - 15.9 g/dL Final     Hematocrit   Date Value Ref Range Status   05/06/2019 41.9 34.0 - 46.6 % Final     Platelets   Date Value Ref Range Status   05/06/2019 220 140 - 450 10*3/mm3 Final       No results found for: CHOL  Lab Results   Component Value Date    TRIG 169 (H) 01/09/2017     Lab Results   Component Value Date    HDL 36 (L) 01/09/2017     Lab Results   Component Value Date     (H) 01/09/2017     No components found for: LDLDIRECTC    Outside labs 2/20/2020  Hemoglobin A1c 8.6%    10/30/2019  ALT 26  AST 16  BUN 17  Creatinine 0.62  Potassium 4.8  Sodium 142  Triglycerides 154  Total cholesterol 149  HDL 34  LDL 84  -  Diagnostic Data:      ECG 12 Lead  Date/Time: 6/19/2020 10:15 AM  Performed by: David Sandoval MD  Authorized by: David Sandoval MD   Comparison: compared with previous ECG from 5/20/2019  Similar to previous ECG  Rhythm: sinus rhythm  Rate: normal  BPM: 95  Comments:  ms  QTc 424 ms            Stress Test 9/2018  Technically poor study due to significant gut attenuation artifact  1) Moderate perfusion abnormality linsey apical segment with reversibility noted   2) Unable to intepret inferior wall   3) Normal LV systolic function ( EF 57% ) with normal LVedV   4) Inferior wall hypokinesis    TTE 1/9/2017   1. Technically difficult study.     2. Moderate left ventricular hypertrophy with moderate reduction in LV      systolic function (EF of 40% to 45%).      3. Severe left atrial enlargement with mild elevation of left ventricular end-diastolic pressures.      4. Trace mitral insufficiency and tricuspid insufficiency.     5.  Contracted IVC.     6. Severe hypokinesis of the anterior and the anterolateral walls from the      midsection to the apex.           Assessment and Plan:   Hector was seen today for advice only.    Diagnoses and all orders for this visit:    Coronary artery disease involving native coronary artery of native heart without angina pectoris  Mixed hyperlipidemia  -Currently asymptomatic.  -Continue DAPT, carvedilol, amlodipine, atorvastatin.  -Encouraged dietary and lifestyle modifications  -Low threshold for repeat ischemic testing should the patient have recurrent anginal-like symptoms.    Chronic systolic congestive heart failure (CMS/HCC)  -EF 8/2012 25 to 30% per TTE.  Improved to 40 to 45% per TTE in 2017.  Stress testing in 2018 with an estimated EF of 57%.  -Continue lisinopril, carvedilol    Essential hypertension  -Elevated this visit.  The patient has not taken her blood pressure medicine since early yesterday due to being out of her prescription.  -Continue amlodipine, Coreg, lisinopril    Tobacco dependence  Hector Nugent  reports that she has been smoking cigarettes. She has a 35.00 pack-year smoking history. She has never used smokeless tobacco.. I have educated her on the risk of diseases from using tobacco products such as cancer, COPD and heart diease.     I advised her to quit and she is willing to quit or at least decrease the amount of cigarettes she is using.. We have discussed the following method/s for tobacco cessation:  Counseling.  Together we have set a quit date for 6 months.  She will follow up with me in 6 months or sooner to check on her progress.    I have encouraged the patient to place a goal of decreasing her smoking from 2 packs/day to 1 pack/day by her next visit    I spent 4 minutes counseling the patient.       - Return in about 6 months (around 12/19/2020).    Scribed for David Sandoval MD by Nneka Sloan, ARETHA   6/19/2020  10:47    I, David Sandoval MD, personally  performed the services as scribed by the above named individual. I have made any necessary edits and it is both accurate and complete.     David Sandoval MD, MSc, [facility]  Interventional Cardiology  [facility]

## 2020-06-19 ENCOUNTER — HOSPITAL ENCOUNTER (OUTPATIENT)
Facility: HOSPITAL | Age: 50
Discharge: HOME | End: 2020-06-19
Payer: MEDICAID

## 2020-06-19 ENCOUNTER — CONSULT (OUTPATIENT)
Dept: CARDIOLOGY | Facility: CLINIC | Age: 50
End: 2020-06-19

## 2020-06-19 VITALS
DIASTOLIC BLOOD PRESSURE: 94 MMHG | OXYGEN SATURATION: 99 % | SYSTOLIC BLOOD PRESSURE: 166 MMHG | HEIGHT: 65 IN | WEIGHT: 220.6 LBS | BODY MASS INDEX: 36.75 KG/M2 | HEART RATE: 74 BPM

## 2020-06-19 DIAGNOSIS — I50.22 CHRONIC SYSTOLIC CONGESTIVE HEART FAILURE (HCC): ICD-10-CM

## 2020-06-19 DIAGNOSIS — I25.10 CORONARY ARTERY DISEASE INVOLVING NATIVE CORONARY ARTERY OF NATIVE HEART WITHOUT ANGINA PECTORIS: Primary | ICD-10-CM

## 2020-06-19 DIAGNOSIS — E78.2 MIXED HYPERLIPIDEMIA: ICD-10-CM

## 2020-06-19 DIAGNOSIS — I10 ESSENTIAL HYPERTENSION: ICD-10-CM

## 2020-06-19 PROCEDURE — 93000 ELECTROCARDIOGRAM COMPLETE: CPT | Performed by: INTERNAL MEDICINE

## 2020-06-19 PROCEDURE — 99243 OFF/OP CNSLTJ NEW/EST LOW 30: CPT | Performed by: INTERNAL MEDICINE

## 2020-06-19 PROCEDURE — 93005 ELECTROCARDIOGRAM TRACING: CPT

## 2020-10-27 ENCOUNTER — TELEPHONE (OUTPATIENT)
Dept: SURGERY | Facility: CLINIC | Age: 50
End: 2020-10-27

## 2021-04-14 ENCOUNTER — HOSPITAL ENCOUNTER (EMERGENCY)
Facility: HOSPITAL | Age: 51
Discharge: HOME | End: 2021-04-14
Attending: STUDENT IN AN ORGANIZED HEALTH CARE EDUCATION/TRAINING PROGRAM | Admitting: STUDENT IN AN ORGANIZED HEALTH CARE EDUCATION/TRAINING PROGRAM

## 2021-04-14 ENCOUNTER — APPOINTMENT (OUTPATIENT)
Dept: GENERAL RADIOLOGY | Facility: HOSPITAL | Age: 51
End: 2021-04-14

## 2021-04-14 VITALS
HEIGHT: 64 IN | TEMPERATURE: 97.9 F | RESPIRATION RATE: 16 BRPM | BODY MASS INDEX: 37.39 KG/M2 | SYSTOLIC BLOOD PRESSURE: 144 MMHG | OXYGEN SATURATION: 96 % | DIASTOLIC BLOOD PRESSURE: 78 MMHG | HEART RATE: 63 BPM | WEIGHT: 219 LBS

## 2021-04-14 DIAGNOSIS — R07.9 CHEST PAIN, UNSPECIFIED TYPE: Primary | ICD-10-CM

## 2021-04-14 LAB
ALBUMIN SERPL-MCNC: 4.2 G/DL (ref 3.5–5.2)
ALBUMIN/GLOB SERPL: 1.4 G/DL
ALP SERPL-CCNC: 146 U/L (ref 39–117)
ALT SERPL W P-5'-P-CCNC: 37 U/L (ref 1–33)
ANION GAP SERPL CALCULATED.3IONS-SCNC: 10.7 MMOL/L (ref 5–15)
AST SERPL-CCNC: 23 U/L (ref 1–32)
BASOPHILS # BLD AUTO: 0.07 10*3/MM3 (ref 0–0.2)
BASOPHILS NFR BLD AUTO: 0.6 % (ref 0–1.5)
BILIRUB SERPL-MCNC: 0.3 MG/DL (ref 0–1.2)
BUN SERPL-MCNC: 11 MG/DL (ref 6–20)
BUN/CREAT SERPL: 18 (ref 7–25)
CALCIUM SPEC-SCNC: 9.1 MG/DL (ref 8.6–10.5)
CHLORIDE SERPL-SCNC: 102 MMOL/L (ref 98–107)
CO2 SERPL-SCNC: 23.3 MMOL/L (ref 22–29)
CREAT SERPL-MCNC: 0.61 MG/DL (ref 0.57–1)
DEPRECATED RDW RBC AUTO: 39.3 FL (ref 37–54)
EOSINOPHIL # BLD AUTO: 0.15 10*3/MM3 (ref 0–0.4)
EOSINOPHIL NFR BLD AUTO: 1.3 % (ref 0.3–6.2)
ERYTHROCYTE [DISTWIDTH] IN BLOOD BY AUTOMATED COUNT: 12.1 % (ref 12.3–15.4)
GFR SERPL CREATININE-BSD FRML MDRD: 103 ML/MIN/1.73
GFR SERPL CREATININE-BSD FRML MDRD: 125 ML/MIN/1.73
GLOBULIN UR ELPH-MCNC: 2.9 GM/DL
GLUCOSE SERPL-MCNC: 370 MG/DL (ref 65–99)
HCT VFR BLD AUTO: 44.4 % (ref 34–46.6)
HGB BLD-MCNC: 14.9 G/DL (ref 12–15.9)
HOLD SPECIMEN: NORMAL
IMM GRANULOCYTES # BLD AUTO: 0.04 10*3/MM3 (ref 0–0.05)
IMM GRANULOCYTES NFR BLD AUTO: 0.4 % (ref 0–0.5)
LYMPHOCYTES # BLD AUTO: 2.18 10*3/MM3 (ref 0.7–3.1)
LYMPHOCYTES NFR BLD AUTO: 19.2 % (ref 19.6–45.3)
MCH RBC QN AUTO: 29.9 PG (ref 26.6–33)
MCHC RBC AUTO-ENTMCNC: 33.6 G/DL (ref 31.5–35.7)
MCV RBC AUTO: 89.2 FL (ref 79–97)
MONOCYTES # BLD AUTO: 0.94 10*3/MM3 (ref 0.1–0.9)
MONOCYTES NFR BLD AUTO: 8.3 % (ref 5–12)
NEUTROPHILS NFR BLD AUTO: 7.96 10*3/MM3 (ref 1.7–7)
NEUTROPHILS NFR BLD AUTO: 70.2 % (ref 42.7–76)
NRBC BLD AUTO-RTO: 0 /100 WBC (ref 0–0.2)
PLATELET # BLD AUTO: 204 10*3/MM3 (ref 140–450)
PMV BLD AUTO: 10.7 FL (ref 6–12)
POTASSIUM SERPL-SCNC: 4.4 MMOL/L (ref 3.5–5.2)
PROT SERPL-MCNC: 7.1 G/DL (ref 6–8.5)
RBC # BLD AUTO: 4.98 10*6/MM3 (ref 3.77–5.28)
SODIUM SERPL-SCNC: 136 MMOL/L (ref 136–145)
TROPONIN T SERPL-MCNC: <0.01 NG/ML (ref 0–0.03)
TROPONIN T SERPL-MCNC: <0.01 NG/ML (ref 0–0.03)
WBC # BLD AUTO: 11.34 10*3/MM3 (ref 3.4–10.8)
WHOLE BLOOD HOLD SPECIMEN: NORMAL
WHOLE BLOOD HOLD SPECIMEN: NORMAL

## 2021-04-14 PROCEDURE — 84484 ASSAY OF TROPONIN QUANT: CPT | Performed by: STUDENT IN AN ORGANIZED HEALTH CARE EDUCATION/TRAINING PROGRAM

## 2021-04-14 PROCEDURE — 99284 EMERGENCY DEPT VISIT MOD MDM: CPT

## 2021-04-14 PROCEDURE — 80053 COMPREHEN METABOLIC PANEL: CPT | Performed by: STUDENT IN AN ORGANIZED HEALTH CARE EDUCATION/TRAINING PROGRAM

## 2021-04-14 PROCEDURE — 71045 X-RAY EXAM CHEST 1 VIEW: CPT

## 2021-04-14 PROCEDURE — 93005 ELECTROCARDIOGRAM TRACING: CPT | Performed by: STUDENT IN AN ORGANIZED HEALTH CARE EDUCATION/TRAINING PROGRAM

## 2021-04-14 PROCEDURE — 85025 COMPLETE CBC W/AUTO DIFF WBC: CPT | Performed by: STUDENT IN AN ORGANIZED HEALTH CARE EDUCATION/TRAINING PROGRAM

## 2021-04-14 RX ORDER — NITROGLYCERIN 0.4 MG/1
0.4 TABLET SUBLINGUAL
Qty: 25 TABLET | Refills: 0 | Status: SHIPPED | OUTPATIENT
Start: 2021-04-14

## 2021-04-14 RX ORDER — ASPIRIN 325 MG
325 TABLET ORAL ONCE
Status: COMPLETED | OUTPATIENT
Start: 2021-04-14 | End: 2021-04-14

## 2021-04-14 RX ORDER — SODIUM CHLORIDE 0.9 % (FLUSH) 0.9 %
10 SYRINGE (ML) INJECTION AS NEEDED
Status: DISCONTINUED | OUTPATIENT
Start: 2021-04-14 | End: 2021-04-14 | Stop reason: HOSPADM

## 2021-04-14 RX ADMIN — ASPIRIN 325 MG ORAL TABLET 325 MG: 325 PILL ORAL at 11:38

## 2021-04-14 NOTE — ED NOTES
Spoke with Dr. Mazariegos's office at this time. Jessica is unable to get pt an appointment sooner than the one she has. Dr. Champion Notified.     Katey Ledezma  04/14/21 [id number]
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
## 2021-04-14 NOTE — DISCHARGE INSTRUCTIONS
At this time they have no openings to move your appointment up.  I would recommend calling on a weekly basis to see if there have been any cancellations.

## 2021-04-19 ENCOUNTER — OFFICE VISIT (OUTPATIENT)
Dept: CARDIOLOGY | Facility: CLINIC | Age: 51
End: 2021-04-19

## 2021-04-19 VITALS
DIASTOLIC BLOOD PRESSURE: 96 MMHG | OXYGEN SATURATION: 97 % | HEIGHT: 64 IN | HEART RATE: 89 BPM | SYSTOLIC BLOOD PRESSURE: 164 MMHG | WEIGHT: 219 LBS | BODY MASS INDEX: 37.39 KG/M2

## 2021-04-19 DIAGNOSIS — E78.5 DYSLIPIDEMIA: ICD-10-CM

## 2021-04-19 DIAGNOSIS — E66.01 SEVERE OBESITY (BMI 35.0-39.9) WITH COMORBIDITY (HCC): ICD-10-CM

## 2021-04-19 DIAGNOSIS — R07.2 PRECORDIAL PAIN: Primary | ICD-10-CM

## 2021-04-19 DIAGNOSIS — E11.8 TYPE 2 DIABETES MELLITUS WITH COMPLICATION, WITHOUT LONG-TERM CURRENT USE OF INSULIN (HCC): ICD-10-CM

## 2021-04-19 DIAGNOSIS — I25.10 CORONARY ARTERY DISEASE INVOLVING NATIVE CORONARY ARTERY OF NATIVE HEART WITHOUT ANGINA PECTORIS: ICD-10-CM

## 2021-04-19 DIAGNOSIS — I10 ESSENTIAL HYPERTENSION: ICD-10-CM

## 2021-04-19 DIAGNOSIS — Z72.0 TOBACCO ABUSE: ICD-10-CM

## 2021-04-19 PROCEDURE — 99214 OFFICE O/P EST MOD 30 MIN: CPT | Performed by: INTERNAL MEDICINE

## 2021-04-19 RX ORDER — CARVEDILOL 12.5 MG/1
12.5 TABLET ORAL 2 TIMES DAILY
Qty: 60 TABLET | Refills: 11 | Status: SHIPPED | OUTPATIENT
Start: 2021-04-19

## 2021-04-19 RX ORDER — OXYCODONE HYDROCHLORIDE AND ACETAMINOPHEN 5; 325 MG/1; MG/1
1 TABLET ORAL EVERY 6 HOURS PRN
COMMUNITY

## 2021-04-19 RX ORDER — AMLODIPINE BESYLATE 10 MG/1
10 TABLET ORAL DAILY
Qty: 30 TABLET | Refills: 11 | Status: SHIPPED | OUTPATIENT
Start: 2021-04-19

## 2021-04-19 RX ORDER — GABAPENTIN 400 MG/1
CAPSULE ORAL
COMMUNITY
Start: 2021-01-14

## 2021-04-19 NOTE — PROGRESS NOTES
"    Subjective:     Encounter Date:04/19/2021      Patient ID: Hector Nugent is a 50 y.o. female.    Chief Complaint: Chest discomfort  HPI  This is a 50-year-old female patient with known coronary artery disease who presents to cardiology clinic to evaluate chest discomfort of approximately 1 month duration.  The patient reports having a pressure sensation in her retrosternal area.  The discomfort does not radiate.  It typically occurs 3-4 times per week but has increased in frequency over the last several weeks.  The patient indicates the discomfort has an 8/10 intensity.  The discomfort will typically only last 15 to 20 seconds per episode.  It is associated with diaphoresis and shortness of breath.  The discomfort is precipitated by stressful situations as well as exertional activities.  It is typically improved with rest and/or sublingual nitroglycerin.  The patient was recently seen in a local emergency room for worsening chest discomfort.  Twelve-lead electrocardiogram showed no high risk ischemic ST-T wave changes or injury current and cardiac troponins were serially normal.  The patient is known to have coronary artery disease with 2 prior myocardial infarctions both of which were addressed with multiple stents being placed.  These events occurred around 2014 and again in 2017.  The patient had an abnormal vasodilator nuclear stress test in 2018.  The patient indicates that at her last catheterization she was told that she had a chronically occluded artery to the bottom of her heart that had developed \"collaterals\".  Apparently an attempt was made at revascularization of this chronic total occlusion that was unsuccessful.  The patient also reports shortness of breath with activity in conjunction with her chest discomfort.  It generally occurs with walking and is relieved with rest.  There is associated diaphoresis but no orthopnea PND or lower extremity edema.  She denies having dizziness palpitations or " syncope.  The patient has a history of hypertension type 2 diabetes mellitus and dyslipidemia.  She is currently smoking between 1-1/2 and 2 packs of cigarettes per day.  The following portions of the patient's history were reviewed and updated as appropriate: allergies, current medications, past family history, past medical history, past social history, past surgical history and problem  Review of Systems   Constitutional: Positive for diaphoresis. Negative for chills, fever, malaise/fatigue, weight gain and weight loss.   HENT: Negative for ear discharge, hearing loss, hoarse voice and nosebleeds.    Eyes: Negative for discharge, double vision, pain and photophobia.   Cardiovascular: Positive for chest pain and dyspnea on exertion. Negative for claudication, cyanosis, irregular heartbeat, leg swelling, near-syncope, orthopnea, palpitations, paroxysmal nocturnal dyspnea and syncope.   Respiratory: Positive for shortness of breath. Negative for cough, hemoptysis, sputum production and wheezing.    Endocrine: Negative for cold intolerance, heat intolerance, polydipsia, polyphagia and polyuria.   Hematologic/Lymphatic: Negative for adenopathy and bleeding problem. Does not bruise/bleed easily.   Skin: Negative for color change, flushing, itching and rash.   Musculoskeletal: Negative for muscle cramps, muscle weakness, myalgias and stiffness.   Gastrointestinal: Negative for abdominal pain, diarrhea, hematemesis, hematochezia, nausea and vomiting.   Genitourinary: Negative for dysuria, frequency and nocturia.   Neurological: Negative for focal weakness, loss of balance, numbness, paresthesias and seizures.   Psychiatric/Behavioral: Negative for altered mental status, hallucinations and suicidal ideas.   Allergic/Immunologic: Negative for HIV exposure, hives and persistent infections.           Current Outpatient Medications:   •  amLODIPine (NORVASC) 5 MG tablet, Take 5 mg by mouth Daily., Disp: , Rfl:   •  aspirin 81  MG chewable tablet, Chew 81 mg Daily. DNS PER DR. ROWLEY, Disp: , Rfl:   •  atorvastatin (LIPITOR) 10 MG tablet, Take 80 mg by mouth Daily., Disp: , Rfl:   •  carvedilol (COREG) 6.25 MG tablet, Take 6.25 mg by mouth 2 (Two) Times a Day With Meals., Disp: , Rfl:   •  clopidogrel (PLAVIX) 75 MG tablet, Take 75 mg by mouth Daily., Disp: , Rfl:   •  cyclobenzaprine (FLEXERIL) 10 MG tablet, Take 10 mg by mouth 3 (Three) Times a Day As Needed., Disp: , Rfl:   •  gabapentin (NEURONTIN) 400 MG capsule, , Disp: , Rfl:   •  JANUVIA 100 MG tablet, Take 100 mg by mouth Daily., Disp: , Rfl:   •  lisinopril (PRINIVIL,ZESTRIL) 20 MG tablet, Take 20 mg by mouth Daily., Disp: , Rfl:   •  metFORMIN (GLUCOPHAGE) 500 MG tablet, Take  by mouth 3 (Three) Times a Day. Patient takes 1/2 pill in am, 1 pill at night., Disp: , Rfl:   •  nitroglycerin (NITROSTAT) 0.4 MG SL tablet, Place 1 tablet under the tongue Every 5 (Five) Minutes As Needed for Chest Pain. Take no more than 3 doses in 15 minutes., Disp: 25 tablet, Rfl: 0  •  oxyCODONE-acetaminophen (PERCOCET) 5-325 MG per tablet, Take 1 tablet by mouth Every 6 (Six) Hours As Needed., Disp: , Rfl:   •  sertraline (ZOLOFT) 25 MG tablet, Take 50 mg by mouth Daily., Disp: , Rfl:     Objective:   Vitals and nursing note reviewed.   Constitutional:       Appearance: Healthy appearance. Not in distress.   Neck:      Vascular: No JVR. JVD normal.   Pulmonary:      Effort: Pulmonary effort is normal.      Breath sounds: Normal breath sounds. No wheezing. No rhonchi. No rales.   Chest:      Chest wall: Not tender to palpatation.   Cardiovascular:      PMI at left midclavicular line. Normal rate. Regular rhythm. Normal S1. Normal S2.      Murmurs: There is no murmur.      No gallop. No click. No rub.   Pulses:     Intact distal pulses.   Edema:     Peripheral edema absent.   Abdominal:      General: Bowel sounds are normal.      Palpations: Abdomen is soft.      Tenderness: There is no abdominal  "tenderness.   Musculoskeletal: Normal range of motion.         General: No tenderness. Skin:     General: Skin is warm and dry.   Neurological:      General: No focal deficit present.      Mental Status: Alert and oriented to person, place and time.       Blood pressure 164/96, pulse 89, height 162.6 cm (64\"), weight 99.3 kg (219 lb), last menstrual period 09/03/2018, SpO2 97 %.   Lab Review:     Assessment:       1. Precordial pain  Mostly typical features for coronary insufficiency.  Multiple risk factors for atherosclerosis progression.    2. Coronary artery disease involving native coronary artery of native heart without angina pectoris  The patient appears to have recurrent angina pectoris with increased frequency duration and intensity of her ischemic burden.  The patient's last nuclear stress test in 2018 showed a limited area of anteroapical ischemia.  She is apparently known to have a chronically occluded RCA.    3. Essential hypertension  Poor blood pressure control.    4. Dyslipidemia  This is followed by the patient's primary care provider.  Goal LDL cholesterol should be less than 70 mg/dL.  Management deferred to PCP.    5. Tobacco abuse  Ongoing heavy daily tobacco abuse.    6. Severe obesity (BMI 35.0-39.9) with comorbidity (CMS/[facility])  Body mass index is just under 38.  The obesity pattern is central.  This is due to excess calorie intake.  There is evidence of multiple comorbidities.    7. Type 2 diabetes mellitus with complication, without long-term current use of insulin (CMS/[facility])  Typical obesity related insulin resistance.    Procedures    Plan:     I have recommended a vasodilator nuclear stress test utilizing a 2-day imaging protocol with supine and prone stress images in order to help mitigate for attenuation artifact which is anticipated given her body habitus.  I have recommended an echocardiogram.  I have recommended increasing her amlodipine to 10 mg orally once per day and increasing " Coreg to 12.5 mg orally twice daily.  The patient has been counseled regarding the essential need to discontinue cigarette smoking.  The patient has been advised regarding the importance of diet exercise and weight management.  Further recommendations will be predicated on the results of her catheterization findings.

## 2021-04-29 ENCOUNTER — APPOINTMENT (OUTPATIENT)
Dept: NUCLEAR MEDICINE | Facility: HOSPITAL | Age: 51
End: 2021-04-29

## 2021-04-30 ENCOUNTER — APPOINTMENT (OUTPATIENT)
Dept: NUCLEAR MEDICINE | Facility: HOSPITAL | Age: 51
End: 2021-04-30

## 2021-05-03 LAB
BH CV ECHO MEAS - % IVS THICK: 10.7 %
BH CV ECHO MEAS - % LVPW THICK: 9.1 %
BH CV ECHO MEAS - AO MAX PG (FULL): 4.6 MMHG
BH CV ECHO MEAS - AO MAX PG: 9 MMHG
BH CV ECHO MEAS - AO MEAN PG (FULL): 3 MMHG
BH CV ECHO MEAS - AO MEAN PG: 5 MMHG
BH CV ECHO MEAS - AO ROOT AREA (BSA CORRECTED): 1.2
BH CV ECHO MEAS - AO ROOT AREA: 4.5 CM^2
BH CV ECHO MEAS - AO ROOT DIAM: 2.4 CM
BH CV ECHO MEAS - AO V2 MAX: 150 CM/SEC
BH CV ECHO MEAS - AO V2 MEAN: 99.3 CM/SEC
BH CV ECHO MEAS - AO V2 VTI: 23.2 CM
BH CV ECHO MEAS - AVA(I,A): 3.7 CM^2
BH CV ECHO MEAS - AVA(I,D): 3.7 CM^2
BH CV ECHO MEAS - AVA(V,A): 2.4 CM^2
BH CV ECHO MEAS - AVA(V,D): 2.4 CM^2
BH CV ECHO MEAS - BSA(HAYCOCK): 2.2 M^2
BH CV ECHO MEAS - BSA: 2 M^2
BH CV ECHO MEAS - BZI_BMI: 37.6 KILOGRAMS/M^2
BH CV ECHO MEAS - BZI_METRIC_HEIGHT: 162.6 CM
BH CV ECHO MEAS - BZI_METRIC_WEIGHT: 99.3 KG
BH CV ECHO MEAS - EDV(CUBED): 85.2 ML
BH CV ECHO MEAS - EDV(MOD-SP4): 128 ML
BH CV ECHO MEAS - EDV(TEICH): 87.7 ML
BH CV ECHO MEAS - EF(CUBED): 69.9 %
BH CV ECHO MEAS - EF(MOD-SP4): 67.2 %
BH CV ECHO MEAS - EF(TEICH): 61.7 %
BH CV ECHO MEAS - ESV(CUBED): 25.7 ML
BH CV ECHO MEAS - ESV(MOD-SP4): 42 ML
BH CV ECHO MEAS - ESV(TEICH): 33.6 ML
BH CV ECHO MEAS - FS: 33 %
BH CV ECHO MEAS - IVS/LVPW: 0.85
BH CV ECHO MEAS - IVSD: 1.4 CM
BH CV ECHO MEAS - IVSS: 1.6 CM
BH CV ECHO MEAS - LA DIMENSION: 3.2 CM
BH CV ECHO MEAS - LA/AO: 1.3
BH CV ECHO MEAS - LAD MAJOR: 5.4 CM
BH CV ECHO MEAS - LAT PEAK E' VEL: 10.1 CM/SEC
BH CV ECHO MEAS - LATERAL E/E' RATIO: 7
BH CV ECHO MEAS - LV DIASTOLIC VOL/BSA (35-75): 63 ML/M^2
BH CV ECHO MEAS - LV IVRT: 0.13 SEC
BH CV ECHO MEAS - LV MASS(C)D: 273.8 GRAMS
BH CV ECHO MEAS - LV MASS(C)DI: 134.7 GRAMS/M^2
BH CV ECHO MEAS - LV MASS(C)S: 187.3 GRAMS
BH CV ECHO MEAS - LV MASS(C)SI: 92.1 GRAMS/M^2
BH CV ECHO MEAS - LV MAX PG: 4.4 MMHG
BH CV ECHO MEAS - LV MEAN PG: 2 MMHG
BH CV ECHO MEAS - LV SYSTOLIC VOL/BSA (12-30): 20.7 ML/M^2
BH CV ECHO MEAS - LV V1 MAX: 104.5 CM/SEC
BH CV ECHO MEAS - LV V1 MEAN: 62.7 CM/SEC
BH CV ECHO MEAS - LV V1 VTI: 24.9 CM
BH CV ECHO MEAS - LVIDD: 4.4 CM
BH CV ECHO MEAS - LVIDS: 3 CM
BH CV ECHO MEAS - LVLD AP4: 9.5 CM
BH CV ECHO MEAS - LVLS AP4: 8.6 CM
BH CV ECHO MEAS - LVOT AREA (M): 3.5 CM^2
BH CV ECHO MEAS - LVOT AREA: 3.5 CM^2
BH CV ECHO MEAS - LVOT DIAM: 2.1 CM
BH CV ECHO MEAS - LVPWD: 1.7 CM
BH CV ECHO MEAS - LVPWS: 1.8 CM
BH CV ECHO MEAS - MED PEAK E' VEL: 10.6 CM/SEC
BH CV ECHO MEAS - MEDIAL E/E' RATIO: 6.7
BH CV ECHO MEAS - MV A MAX VEL: 98.3 CM/SEC
BH CV ECHO MEAS - MV DEC SLOPE: 540.5 CM/SEC^2
BH CV ECHO MEAS - MV DEC TIME: 0.13 SEC
BH CV ECHO MEAS - MV E MAX VEL: 70.8 CM/SEC
BH CV ECHO MEAS - MV E/A: 0.72
BH CV ECHO MEAS - MV MAX PG: 4.8 MMHG
BH CV ECHO MEAS - MV MEAN PG: 2 MMHG
BH CV ECHO MEAS - MV P1/2T MAX VEL: 74.3 CM/SEC
BH CV ECHO MEAS - MV P1/2T: 40.3 MSEC
BH CV ECHO MEAS - MV V2 MAX: 109 CM/SEC
BH CV ECHO MEAS - MV V2 MEAN: 73.6 CM/SEC
BH CV ECHO MEAS - MV V2 VTI: 22.1 CM
BH CV ECHO MEAS - MVA P1/2T LCG: 3 CM^2
BH CV ECHO MEAS - MVA(P1/2T): 5.5 CM^2
BH CV ECHO MEAS - MVA(VTI): 3.9 CM^2
BH CV ECHO MEAS - PA MAX PG: 3.2 MMHG
BH CV ECHO MEAS - PA V2 MAX: 90.1 CM/SEC
BH CV ECHO MEAS - RAP SYSTOLE: 3 MMHG
BH CV ECHO MEAS - SI(AO): 51.6 ML/M^2
BH CV ECHO MEAS - SI(CUBED): 29.3 ML/M^2
BH CV ECHO MEAS - SI(LVOT): 42.4 ML/M^2
BH CV ECHO MEAS - SI(MOD-SP4): 42.3 ML/M^2
BH CV ECHO MEAS - SI(TEICH): 26.6 ML/M^2
BH CV ECHO MEAS - SV(AO): 105 ML
BH CV ECHO MEAS - SV(CUBED): 59.5 ML
BH CV ECHO MEAS - SV(LVOT): 86.2 ML
BH CV ECHO MEAS - SV(MOD-SP4): 86 ML
BH CV ECHO MEAS - SV(TEICH): 54.1 ML
BH CV ECHO MEAS - TV MAX PG: 0.95 MMHG
BH CV ECHO MEAS - TV V2 MAX: 48.8 CM/SEC
BH CV ECHO MEASUREMENTS AVERAGE E/E' RATIO: 6.84
LEFT ATRIUM VOLUME: 39 CM3
LV EF 2D ECHO EST: 65 %

## 2021-05-05 ENCOUNTER — APPOINTMENT (OUTPATIENT)
Dept: NUCLEAR MEDICINE | Facility: HOSPITAL | Age: 51
End: 2021-05-05

## 2021-05-06 ENCOUNTER — APPOINTMENT (OUTPATIENT)
Dept: NUCLEAR MEDICINE | Facility: HOSPITAL | Age: 51
End: 2021-05-06

## 2021-06-07 ENCOUNTER — TELEPHONE (OUTPATIENT)
Dept: CARDIOLOGY | Facility: CLINIC | Age: 51
End: 2021-06-07

## 2021-08-15 ENCOUNTER — APPOINTMENT (OUTPATIENT)
Dept: GENERAL RADIOLOGY | Facility: HOSPITAL | Age: 51
End: 2021-08-15

## 2021-08-15 ENCOUNTER — HOSPITAL ENCOUNTER (EMERGENCY)
Facility: HOSPITAL | Age: 51
Discharge: HOME | End: 2021-08-15
Attending: EMERGENCY MEDICINE | Admitting: EMERGENCY MEDICINE

## 2021-08-15 VITALS
SYSTOLIC BLOOD PRESSURE: 148 MMHG | HEART RATE: 92 BPM | OXYGEN SATURATION: 96 % | HEIGHT: 65 IN | BODY MASS INDEX: 36.65 KG/M2 | WEIGHT: 220 LBS | RESPIRATION RATE: 18 BRPM | TEMPERATURE: 98.9 F | DIASTOLIC BLOOD PRESSURE: 92 MMHG

## 2021-08-15 DIAGNOSIS — M25.461 EFFUSION OF RIGHT KNEE: ICD-10-CM

## 2021-08-15 DIAGNOSIS — S79.912A INJURY OF LEFT HIP, INITIAL ENCOUNTER: ICD-10-CM

## 2021-08-15 DIAGNOSIS — S92.214A CLOSED NONDISPLACED FRACTURE OF CUBOID OF RIGHT FOOT, INITIAL ENCOUNTER: Primary | ICD-10-CM

## 2021-08-15 PROCEDURE — 73630 X-RAY EXAM OF FOOT: CPT

## 2021-08-15 PROCEDURE — 73562 X-RAY EXAM OF KNEE 3: CPT

## 2021-08-15 PROCEDURE — 99283 EMERGENCY DEPT VISIT LOW MDM: CPT

## 2021-08-15 PROCEDURE — 73502 X-RAY EXAM HIP UNI 2-3 VIEWS: CPT

## 2021-08-15 RX ORDER — HYDROCODONE BITARTRATE AND ACETAMINOPHEN 5; 325 MG/1; MG/1
1 TABLET ORAL EVERY 6 HOURS PRN
Qty: 12 TABLET | Refills: 0 | Status: CANCELLED | OUTPATIENT
Start: 2021-08-15

## 2021-08-15 RX ORDER — HYDROCODONE BITARTRATE AND ACETAMINOPHEN 5; 325 MG/1; MG/1
1 TABLET ORAL ONCE
Status: COMPLETED | OUTPATIENT
Start: 2021-08-15 | End: 2021-08-15

## 2021-08-15 RX ADMIN — HYDROCODONE BITARTRATE AND ACETAMINOPHEN 1 TABLET: 5; 325 TABLET ORAL at 19:37
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
## 2021-08-16 NOTE — DISCHARGE INSTRUCTIONS
Records show that you have an active prescription for hydrocodone. We are unable to fill any more controlled substances for you at this time given this. Please take your medications as prescribed and follow up with Dr. Curry at the next opening he has in the office.

## 2021-08-23 ENCOUNTER — TELEPHONE (OUTPATIENT)
Dept: ORTHOPEDIC SURGERY | Facility: CLINIC | Age: 51
End: 2021-08-23
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
## 2021-09-01 ENCOUNTER — OFFICE VISIT (OUTPATIENT)
Dept: ORTHOPEDIC SURGERY | Facility: CLINIC | Age: 51
End: 2021-09-01

## 2021-09-01 VITALS
DIASTOLIC BLOOD PRESSURE: 90 MMHG | SYSTOLIC BLOOD PRESSURE: 152 MMHG | RESPIRATION RATE: 18 BRPM | HEIGHT: 65 IN | WEIGHT: 221 LBS | BODY MASS INDEX: 36.82 KG/M2

## 2021-09-01 DIAGNOSIS — S82.121A CLOSED FRACTURE OF LATERAL PORTION OF RIGHT TIBIAL PLATEAU, INITIAL ENCOUNTER: ICD-10-CM

## 2021-09-01 DIAGNOSIS — S89.91XA KNEE INJURY, RIGHT, INITIAL ENCOUNTER: Primary | ICD-10-CM

## 2021-09-01 DIAGNOSIS — M17.0 PRIMARY OSTEOARTHRITIS OF BOTH KNEES: ICD-10-CM

## 2021-09-01 PROCEDURE — 99203 OFFICE O/P NEW LOW 30 MIN: CPT | Performed by: PHYSICIAN ASSISTANT

## 2021-09-01 RX ORDER — CANAGLIFLOZIN 300 MG/1
TABLET, FILM COATED ORAL
COMMUNITY
Start: 2021-08-13

## 2021-09-01 RX ORDER — OMEGA-3-ACID ETHYL ESTERS 1 G/1
2 CAPSULE, LIQUID FILLED ORAL 2 TIMES DAILY
COMMUNITY
Start: 2021-08-13

## 2021-09-01 RX ORDER — LORATADINE 10 MG/1
10 TABLET ORAL DAILY
COMMUNITY
Start: 2021-08-13

## 2021-09-01 RX ORDER — BLOOD SUGAR DIAGNOSTIC
STRIP MISCELLANEOUS
COMMUNITY
Start: 2021-08-13

## 2021-09-01 RX ORDER — PEN NEEDLE, DIABETIC 31 GX5/16"
NEEDLE, DISPOSABLE MISCELLANEOUS 2 TIMES DAILY
COMMUNITY
Start: 2021-08-13

## 2021-09-01 RX ORDER — INSULIN GLARGINE 100 [IU]/ML
INJECTION, SOLUTION SUBCUTANEOUS
COMMUNITY
Start: 2021-08-13

## 2021-09-01 RX ORDER — LOSARTAN POTASSIUM 50 MG/1
50 TABLET ORAL DAILY
COMMUNITY
Start: 2021-08-13 | End: 2022-03-15 | Stop reason: DRUGHIGH

## 2021-09-01 RX ORDER — GEMFIBROZIL 600 MG/1
TABLET, FILM COATED ORAL
COMMUNITY
Start: 2021-08-13

## 2021-09-01 RX ORDER — OXYCODONE HYDROCHLORIDE 5 MG/1
5 TABLET ORAL 3 TIMES DAILY
COMMUNITY
Start: 2021-08-23

## 2021-09-01 RX ORDER — ESTRADIOL 0.5 MG/1
0.5 TABLET ORAL DAILY
COMMUNITY
Start: 2021-08-13
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
## 2021-09-09 ENCOUNTER — TELEPHONE (OUTPATIENT)
Dept: ORTHOPEDIC SURGERY | Facility: CLINIC | Age: 51
End: 2021-09-09

## 2021-09-09 ENCOUNTER — HOSPITAL ENCOUNTER (OUTPATIENT)
Dept: CT IMAGING | Facility: HOSPITAL | Age: 51
Discharge: HOME | End: 2021-09-09
Admitting: PHYSICIAN ASSISTANT

## 2021-09-09 PROCEDURE — 73700 CT LOWER EXTREMITY W/O DYE: CPT

## 2021-09-09 NOTE — PROGRESS NOTES
Please call the patient regarding her CT of right knee. Could you let the patient know the CT of knee did NOT reveal a fracture. She does have significant Knee arthritis which we could treat with a cortisone injection if she would prefer that?

## 2021-09-09 NOTE — TELEPHONE ENCOUNTER
Told pt her CT did not show a fracture and that we could treat her pain with cortisone, she stated she would call back to reschedule.

## 2021-09-10 ENCOUNTER — PATIENT ROUNDING (BHMG ONLY) (OUTPATIENT)
Dept: ORTHOPEDIC SURGERY | Facility: CLINIC | Age: 51
End: 2021-09-10

## 2021-09-10 NOTE — PROGRESS NOTES
"September 10, 2021    Hello, may I speak with Hector Nugent?    My name is Jane Tatum    I am  with [facility]  [address] [place] [id number] [phone].    Before we get started may I verify your date of birth? 1970    I am calling to officially welcome you to our practice and ask about your recent visit. Is this a good time to talk? yes    Tell me about your visit with us. What things went well?  \" Everyone was nice, was a good experience.\"       We're always looking for ways to make our patients' experiences even better. Do you have recommendations on ways we may improve?  no    Overall were you satisfied with your first visit to our practice? yes       I appreciate you taking the time to speak with me today. Is there anything else I can do for you? no      Thank you, and have a great day.      "

## 2022-03-03 ENCOUNTER — DOCUMENTATION (OUTPATIENT)
Dept: ENDOCRINOLOGY | Facility: CLINIC | Age: 52
End: 2022-03-03

## 2022-03-03 NOTE — PROGRESS NOTES
Patient declined filling specialty medication at [facility] and/or enrollment in the Patient Management Program.    Patient has [employer] insurance which we cannot mail to and secondary of Medicaid which we have to coordinate patient to be home to sign for delivery    Olimpia Haas CPhT  Pharmacy Care Coordinator  3/3/2022  12:03 EST

## 2022-03-15 ENCOUNTER — OFFICE VISIT (OUTPATIENT)
Dept: ENDOCRINOLOGY | Facility: CLINIC | Age: 52
End: 2022-03-15

## 2022-03-15 ENCOUNTER — LAB (OUTPATIENT)
Dept: LAB | Facility: HOSPITAL | Age: 52
End: 2022-03-15

## 2022-03-15 VITALS
OXYGEN SATURATION: 99 % | SYSTOLIC BLOOD PRESSURE: 148 MMHG | BODY MASS INDEX: 35.99 KG/M2 | HEIGHT: 65 IN | DIASTOLIC BLOOD PRESSURE: 76 MMHG | HEART RATE: 74 BPM | WEIGHT: 216 LBS

## 2022-03-15 DIAGNOSIS — I10 ESSENTIAL HYPERTENSION: ICD-10-CM

## 2022-03-15 DIAGNOSIS — E11.65 UNCONTROLLED TYPE 2 DIABETES MELLITUS WITH HYPERGLYCEMIA: ICD-10-CM

## 2022-03-15 DIAGNOSIS — I25.10 CORONARY ARTERY DISEASE INVOLVING NATIVE CORONARY ARTERY OF NATIVE HEART WITHOUT ANGINA PECTORIS: ICD-10-CM

## 2022-03-15 DIAGNOSIS — E11.65 UNCONTROLLED TYPE 2 DIABETES MELLITUS WITH HYPERGLYCEMIA: Primary | ICD-10-CM

## 2022-03-15 DIAGNOSIS — E78.5 DYSLIPIDEMIA: ICD-10-CM

## 2022-03-15 PROBLEM — Z79.4 INSULIN LONG-TERM USE: Status: RESOLVED | Noted: 2022-03-15 | Resolved: 2022-03-15

## 2022-03-15 PROBLEM — Z79.4 INSULIN LONG-TERM USE: Status: ACTIVE | Noted: 2022-03-15

## 2022-03-15 LAB
ALBUMIN SERPL-MCNC: 4.4 G/DL (ref 3.5–5.2)
ALBUMIN UR-MCNC: <1.2 MG/DL
ALBUMIN/GLOB SERPL: 1.4 G/DL
ALP SERPL-CCNC: 83 U/L (ref 39–117)
ALT SERPL W P-5'-P-CCNC: 34 U/L (ref 1–33)
ANION GAP SERPL CALCULATED.3IONS-SCNC: 12.4 MMOL/L (ref 5–15)
AST SERPL-CCNC: 26 U/L (ref 1–32)
BILIRUB SERPL-MCNC: 0.3 MG/DL (ref 0–1.2)
BUN SERPL-MCNC: 12 MG/DL (ref 6–20)
BUN/CREAT SERPL: 17.1 (ref 7–25)
CALCIUM SPEC-SCNC: 9.2 MG/DL (ref 8.6–10.5)
CHLORIDE SERPL-SCNC: 103 MMOL/L (ref 98–107)
CHOLEST SERPL-MCNC: 177 MG/DL (ref 0–200)
CO2 SERPL-SCNC: 24.6 MMOL/L (ref 22–29)
CREAT SERPL-MCNC: 0.7 MG/DL (ref 0.57–1)
CREAT UR-MCNC: 51.5 MG/DL
EGFRCR SERPLBLD CKD-EPI 2021: 103.6 ML/MIN/1.73
EXPIRATION DATE: ABNORMAL
EXPIRATION DATE: NORMAL
GLOBULIN UR ELPH-MCNC: 3.2 GM/DL
GLUCOSE BLDC GLUCOMTR-MCNC: 156 MG/DL (ref 70–130)
GLUCOSE SERPL-MCNC: 189 MG/DL (ref 65–99)
HBA1C MFR BLD: 10.1 %
HDLC SERPL-MCNC: 30 MG/DL (ref 40–60)
LDLC SERPL CALC-MCNC: 89 MG/DL (ref 0–100)
LDLC/HDLC SERPL: 2.59 {RATIO}
Lab: ABNORMAL
Lab: NORMAL
MICROALBUMIN/CREAT UR: NORMAL MG/G{CREAT}
POTASSIUM SERPL-SCNC: 4.2 MMOL/L (ref 3.5–5.2)
PROT SERPL-MCNC: 7.6 G/DL (ref 6–8.5)
SODIUM SERPL-SCNC: 140 MMOL/L (ref 136–145)
TRIGL SERPL-MCNC: 347 MG/DL (ref 0–150)
TSH SERPL DL<=0.05 MIU/L-ACNC: 2.96 UIU/ML (ref 0.27–4.2)
VLDLC SERPL-MCNC: 58 MG/DL (ref 5–40)

## 2022-03-15 PROCEDURE — 99204 OFFICE O/P NEW MOD 45 MIN: CPT | Performed by: INTERNAL MEDICINE

## 2022-03-15 PROCEDURE — 80061 LIPID PANEL: CPT

## 2022-03-15 PROCEDURE — 83036 HEMOGLOBIN GLYCOSYLATED A1C: CPT | Performed by: INTERNAL MEDICINE

## 2022-03-15 PROCEDURE — 80053 COMPREHEN METABOLIC PANEL: CPT

## 2022-03-15 PROCEDURE — 82947 ASSAY GLUCOSE BLOOD QUANT: CPT | Performed by: INTERNAL MEDICINE

## 2022-03-15 PROCEDURE — 82043 UR ALBUMIN QUANTITATIVE: CPT

## 2022-03-15 PROCEDURE — 84443 ASSAY THYROID STIM HORMONE: CPT

## 2022-03-15 PROCEDURE — 3046F HEMOGLOBIN A1C LEVEL >9.0%: CPT | Performed by: INTERNAL MEDICINE

## 2022-03-15 PROCEDURE — 82570 ASSAY OF URINE CREATININE: CPT

## 2022-03-15 RX ORDER — SERTRALINE HYDROCHLORIDE 100 MG/1
100 TABLET, FILM COATED ORAL DAILY
COMMUNITY
Start: 2022-02-28

## 2022-03-15 RX ORDER — LOSARTAN POTASSIUM 100 MG/1
100 TABLET ORAL DAILY
Qty: 90 TABLET | Refills: 3 | Status: SHIPPED | OUTPATIENT
Start: 2022-03-15

## 2022-03-15 RX ORDER — ATORVASTATIN CALCIUM 80 MG/1
80 TABLET, FILM COATED ORAL DAILY
COMMUNITY
Start: 2022-02-28

## 2022-03-15 RX ORDER — DULAGLUTIDE 1.5 MG/.5ML
1.5 INJECTION, SOLUTION SUBCUTANEOUS
Qty: 6 ML | Refills: 3 | Status: SHIPPED | OUTPATIENT
Start: 2022-03-15 | End: 2022-03-16

## 2022-03-15 NOTE — PROGRESS NOTES
Office Note      Date: 03/15/2022  Patient Name: Hector Nugent  MRN: [id number]  : 1970    Chief Complaint   Patient presents with   • Diabetes       History of Present Illness:   Hector Nugent is a 51 y.o. female who presents for Diabetes type 2. Diagnosed in: . Treated in past with oral agents. Current treatments: metformin, januvia, invokana. She has been prescribed basal insulin but hasn't started this.  She claims needle phobia and hasn't started the insulin.  Number of insulin shots per day: none. Checks blood sugar 2 times a week. Has low blood sugar: no. Aspirin use: Yes. Statin use: Yes. ACE-I/ARB use: Yes.  Last eye exam: .    Last A1c was reportedly 12%.  She hasn't had formal DM education but has dealt with DM in her family.      Subjective      Diabetic Complications:  Eyes: No  Kidneys: No  Feet: Yes - neuropathy  Heart: Yes - stents    Diet and Exercise:  Meals per day: >4  Minutes of exercise per week: 0 mins.    Review of Systems:   Review of Systems   Constitutional: Positive for activity change, appetite change, chills, diaphoresis and fatigue.   HENT: Positive for congestion, dental problem, facial swelling, rhinorrhea, sinus pressure, sneezing and sore throat.    Eyes: Negative.    Respiratory: Positive for apnea, cough, chest tightness, shortness of breath and wheezing.    Cardiovascular: Positive for chest pain, palpitations and leg swelling.   Gastrointestinal: Positive for abdominal distention and abdominal pain.        Heartburn/Reflux   Endocrine: Positive for cold intolerance, heat intolerance, polydipsia, polyphagia and polyuria.   Genitourinary: Positive for enuresis and urgency.   Musculoskeletal: Positive for arthralgias, gait problem, joint swelling and myalgias.   Skin: Positive for rash and wound.        Hair Loss   Allergic/Immunologic: Positive for environmental allergies.   Neurological: Positive for dizziness, weakness, light-headedness, numbness and  "headaches.   Hematological: Bruises/bleeds easily.   Psychiatric/Behavioral: Positive for agitation, dysphoric mood and sleep disturbance. The patient is nervous/anxious.        The following portions of the patient's history were reviewed and updated as appropriate: allergies, current medications, past family history, past medical history, past social history, past surgical history and problem list.    Objective     Visit Vitals  /76   Pulse 74   Ht 165.1 cm (65\")   Wt 98 kg (216 lb)   LMP 09/03/2018   SpO2 99%   BMI 35.94 kg/m²       Physical Exam:  Physical Exam  Constitutional:       Appearance: Normal appearance.   HENT:      Head: Normocephalic and atraumatic.   Eyes:      Extraocular Movements: Extraocular movements intact.      Conjunctiva/sclera: Conjunctivae normal.      Pupils: Pupils are equal, round, and reactive to light.   Neck:      Thyroid: No thyroid mass, thyromegaly or thyroid tenderness.   Cardiovascular:      Rate and Rhythm: Normal rate and regular rhythm.      Pulses:           Dorsalis pedis pulses are 1+ on the right side and 1+ on the left side.        Posterior tibial pulses are 1+ on the right side and 1+ on the left side.      Heart sounds: Normal heart sounds.   Pulmonary:      Effort: Pulmonary effort is normal.      Breath sounds: Normal breath sounds.   Abdominal:      General: Bowel sounds are normal.      Palpations: Abdomen is soft.   Musculoskeletal:         General: Normal range of motion.      Cervical back: Normal range of motion and neck supple.   Feet:      Right foot:      Protective Sensation: 5 sites tested. 5 sites sensed.      Skin integrity: Dry skin present.      Toenail Condition: Right toenails are abnormally thick. Fungal disease present.     Left foot:      Protective Sensation: 5 sites tested. 5 sites sensed.      Skin integrity: Dry skin present.      Toenail Condition: Left toenails are abnormally thick. Fungal disease present.  Lymphadenopathy:      " Cervical: No cervical adenopathy.   Skin:     General: Skin is warm and dry.   Neurological:      General: No focal deficit present.      Mental Status: She is alert.   Psychiatric:         Mood and Affect: Mood normal.         Behavior: Behavior normal.         Thought Content: Thought content normal.         Judgment: Judgment normal.         Labs:    HbA1c  Hemoglobin A1C   Date Value Ref Range Status   03/15/2022 10.1 % Final   09/23/2018 8.10 (H) 4.80 - 5.60 % Final   .    CMP  Lab Results   Component Value Date    GLUCOSE 370 (H) 04/14/2021    BUN 11 04/14/2021    CREATININE 0.61 04/14/2021    EGFRIFNONA 103 04/14/2021    EGFRIFAFRI 125 04/14/2021    BCR 18.0 04/14/2021    K 4.4 04/14/2021    CO2 23.3 04/14/2021    CALCIUM 9.1 04/14/2021    LABIL2 1.2 01/10/2017    AST 23 04/14/2021    ALT 37 (H) 04/14/2021        Lipid Panel  Lab Results   Component Value Date    CHLPL 197 01/09/2017    HDL 36 (L) 01/09/2017     (H) 01/09/2017    TRIG 169 (H) 01/09/2017        TSH  Lab Results   Component Value Date    TSH 2.40 01/09/2017        Hemoglobin A1C  Lab Results   Component Value Date    HGBA1C 10.1 03/15/2022        Microalbumin/Creatinine  No results found for: MALBCRERATI        Assessment / Plan      Assessment & Plan:  Diagnoses and all orders for this visit:    1. Uncontrolled type 2 diabetes mellitus with hyperglycemia (HCC) (Primary)  Assessment & Plan:  Diabetes is unchanged.  A1c is too high.  She claims needle phobia and doesn't want to take insulin daily.  She said she would try once weekly GLP-1 RA.  Start GLP-1 RA.  Potential s/e discussed.    Diabetes will be reassessed in 3 months.    Orders:  -     POC Glycosylated Hemoglobin (Hb A1C)  -     POC Glucose, Blood  -     Comprehensive Metabolic Panel; Future  -     TSH; Future  -     Microalbumin / Creatinine Urine Ratio - Urine, Clean Catch; Future  -     Lipid Panel; Future    2. Essential hypertension  Assessment & Plan:  Hypertension is  unchanged.  Increase ARB.  Blood pressure will be reassessed at the next regular appointment.      3. Dyslipidemia  Assessment & Plan:  Continue statin and gemfibrozil.      4. Coronary artery disease involving native coronary artery of native heart without angina pectoris  Assessment & Plan:  Continue ASA, statin, SGLT-2 inhibitor.  Add GLP-1 RA.  Stop smoking.       Other orders  -     losartan (COZAAR) 100 MG tablet; Take 1 tablet by mouth Daily.  Dispense: 90 tablet; Refill: 3  -     Dulaglutide (Trulicity) 1.5 MG/0.5ML solution pen-injector; Inject 1.5 mg under the skin into the appropriate area as directed Every 7 (Seven) Days.  Dispense: 6 mL; Refill: 3       Return in about 3 months (around 6/15/2022) for Recheck with A1c, CMP.    Aramis Hartley MD   03/15/2022

## 2022-03-15 NOTE — ASSESSMENT & PLAN NOTE
Diabetes is unchanged.  A1c is too high.  She claims needle phobia and doesn't want to take insulin daily.  She said she would try once weekly GLP-1 RA.  Start GLP-1 RA.  Potential s/e discussed.    Diabetes will be reassessed in 3 months.

## 2022-03-15 NOTE — ASSESSMENT & PLAN NOTE
Hypertension is unchanged.  Increase ARB.  Blood pressure will be reassessed at the next regular appointment.

## 2022-03-16 RX ORDER — SEMAGLUTIDE 1.34 MG/ML
0.5 INJECTION, SOLUTION SUBCUTANEOUS WEEKLY
Qty: 4.5 ML | Refills: 1 | Status: SHIPPED | OUTPATIENT
Start: 2022-03-16 | End: 2022-09-02

## 2022-03-17 ENCOUNTER — PRIOR AUTHORIZATION (OUTPATIENT)
Dept: ENDOCRINOLOGY | Facility: CLINIC | Age: 52
End: 2022-03-17

## 2022-03-17 NOTE — TELEPHONE ENCOUNTER
Hector Nugent (Jolley: [id number])  Ozempic (0.25 or 0.5 MG/DOSE) 2MG/1.5ML pen-injectors     Form  [facility] [place] Medicaid ePA Form 2017 NCPDP  Created  12 minutes ago  Sent to Plan  11 minutes ago  Plan Response  11 minutes ago  Submit Clinical Questions  10 minutes ago  Determination  Favorable  9 minutes ago  Message from Plan  The request has been approved. The authorization is effective for a maximum of 12 fills from 03/17/2022 to 03/16/2023, as long as the member is enrolled in their current health plan. A written notification letter will follow with additional detail

## 2022-06-24 ENCOUNTER — TRANSCRIBE ORDERS (OUTPATIENT)
Dept: ADMINISTRATIVE | Facility: HOSPITAL | Age: 52
End: 2022-06-24

## 2022-06-24 DIAGNOSIS — Z12.31 BREAST CANCER SCREENING BY MAMMOGRAM: Primary | ICD-10-CM

## 2022-07-05 ENCOUNTER — HOSPITAL ENCOUNTER (OUTPATIENT)
Dept: MAMMOGRAPHY | Facility: HOSPITAL | Age: 52
End: 2022-07-05

## 2022-09-02 RX ORDER — SEMAGLUTIDE 1.34 MG/ML
0.5 INJECTION, SOLUTION SUBCUTANEOUS WEEKLY
Qty: 1.5 ML | Refills: 0 | Status: SHIPPED | OUTPATIENT
Start: 2022-09-02

## 2022-10-03 RX ORDER — SEMAGLUTIDE 1.34 MG/ML
0.5 INJECTION, SOLUTION SUBCUTANEOUS WEEKLY
Qty: 1.5 ML | Refills: 0 | OUTPATIENT
Start: 2022-10-03

## 2023-03-23 RX ORDER — DULAGLUTIDE 1.5 MG/.5ML
INJECTION, SOLUTION SUBCUTANEOUS
Qty: 6 ML | Refills: 3 | OUTPATIENT
Start: 2023-03-23

## 2023-03-23 NOTE — TELEPHONE ENCOUNTER
Last office visit 03/15/22.  Next scheduled office visit NONE. Dx code:  E11.65.  Refill to [facility]

## 2023-04-20 ENCOUNTER — TRANSCRIBE ORDERS (OUTPATIENT)
Dept: ADMINISTRATIVE | Facility: HOSPITAL | Age: 53
End: 2023-04-20
Payer: COMMERCIAL

## 2023-04-20 DIAGNOSIS — Z12.31 BREAST CANCER SCREENING BY MAMMOGRAM: Primary | ICD-10-CM

## 2023-05-24 ENCOUNTER — TELEPHONE (OUTPATIENT)
Dept: CARDIOLOGY | Facility: CLINIC | Age: 53
End: 2023-05-24

## 2023-07-13 NOTE — PROGRESS NOTES
"             [facility] Cardiology Office Follow Up Note    Hector Nugent  [id number]  07/28/2023    Primary Care Provider: Ciro Amador MD   Referring Provider: Ciro Amador MD    Chief Complaint: CAD    History of Present Illness:   Mrs. Hector Nugent is a 52 y.o. female who presents to the Cardiology Clinic for follow up of CAD.  The patient has a past medical history of coronary artery disease (8/2012, NSTEMI with MARGE x's 1 and BMS x's 1 in LAD), hypertension, hyperlipidemia, type 2 diabetes mellitus, and chronic tobacco use.  She was last seen in the office in 2021 at which time she reported a 1 month history of chest discomfort.  At that time, she underwent an echocardiogram which showed normal LVEF equals 65%, and grade 1 diastolic dysfunction.  An order was placed for the patient to undergo a vasodilator nuclear stress test, but this was never completed by the patient.  She presents today for follow-up.  The patient states that she feels better now than she did in her 40s.  However, she also states she takes nitroglycerin tablets 1-2 times per week.  She states that if she stays outside for too long and gets too hot she \"ciera\" at which time she comes inside and takes nitroglycerin.  She notes that her chest discomfort is 2 out of 10 on the pain scale and usually does not last very long.  She does state that she walked 4 flights up the note was dark replica in [facility] last month without difficulty.  She is uncertain if the nitroglycerin is helping or not.  She attributes this discomfort to anxiety.  She was recently started on new medication by her psychiatrist but states that it broke her out in a rash for which she had to go to urgent care.  She reports shortness of breath just walking around the house.  She sleeps on 2 pillows for neck support.  She is also concerned because she never experienced chest pain with any of her other 3 heart attacks.  She notes " that her diabetes is uncontrolled and that she needs to go to endocrinology, but has not done so yet.  She just had blood work drawn 2 days ago but has not been notified of the results yet.  She continues to take aspirin and Plavix without significant bruising or bleeding.  She smokes 2 packs of cigarettes per day.  She reports chronic lower extremity edema and neuropathy.  She notes that her parents both had bypass surgery, but her father is now  from diabetic complications (bilateral BKA).  She denies ever having had a pulmonary function test and reports she is supposed to be using a CPAP machine, but does not.  She offers no other complaints or concerns at this time.      Past Cardiac Testin. Last Coronary Angio: None  2. Prior Stress Testin2018   Technically poor study due to significant gut attenuation artifact  1) Moderate perfusion abnormality linsey apical segment with reversibility noted   2) Unable to intepret inferior wall   3) Normal LV systolic function ( EF 57% ) with normal LVedV   4) Inferior wall hypokinesis   3. Last Echo: 5/3/2021  Left ventricular wall thickness is consistent with moderate concentric hypertrophy.  Estimated left ventricular EF = 65% Left ventricular ejection fraction appears to be 61 - 65%. Left ventricular systolic function is normal.  Left ventricular diastolic function is consistent with (grade I) impaired relaxation.  Calculated right ventricular systolic pressure from tricuspid regurgitation is 0 mmHg.  4. Prior Holter Monitor: None    Review of Systems:   Review of Systems  As Noted in HPI.   I have reviewed and confirmed the accuracy of the ROS as documented by the MA/CLAUDY/RN ARETHA Barger    I have reviewed and/or updated the patient's past medical, past surgical, family, social history, problem list and allergies as appropriate.     Medications:     Current Outpatient Medications:     Allergy Relief 10 MG tablet, Take 1 tablet by mouth  Daily., Disp: , Rfl:     amLODIPine (NORVASC) 10 MG tablet, Take 1 tablet by mouth Daily., Disp: 30 tablet, Rfl: 11    aspirin 81 MG chewable tablet, Chew 1 tablet Daily. DNS PER DR. ROWLEY, Disp: , Rfl:     atorvastatin (LIPITOR) 80 MG tablet, Take 1 tablet by mouth Daily., Disp: , Rfl:     B-D ULTRAFINE III SHORT PEN 31G X 8 MM misc, 2 (Two) Times a Day., Disp: , Rfl:     carvedilol (COREG) 12.5 MG tablet, Take 1 tablet by mouth 2 (Two) Times a Day., Disp: 60 tablet, Rfl: 11    clopidogrel (PLAVIX) 75 MG tablet, Take 1 tablet by mouth Daily., Disp: , Rfl:     estradiol (ESTRACE) 0.5 MG tablet, Take 1 tablet by mouth Daily., Disp: , Rfl:     gabapentin (NEURONTIN) 600 MG tablet, , Disp: , Rfl:     gemfibrozil (LOPID) 600 MG tablet, TAKE ONE TABLET BY MOUTH TWICE DAILY 30 minutes BEFORE morning AND evening meal, Disp: , Rfl:     hydrOXYzine (ATARAX) 25 MG tablet, Take 1 tablet by mouth 3 (Three) Times a Day., Disp: , Rfl:     Invokana 300 MG tablet tablet, TAKE ONE TABLET BY MOUTH EVERY DAY BEFORE first meal of DAY, Disp: , Rfl:     Januvia 100 MG tablet, Take 1 tablet by mouth Daily., Disp: , Rfl:     Lantus SoloStar 100 UNIT/ML injection pen, inject 30 units by subcutaneous route DAILY, Disp: , Rfl:     losartan (COZAAR) 50 MG tablet, Take 1 tablet by mouth Daily., Disp: , Rfl:     metFORMIN (GLUCOPHAGE) 1000 MG tablet, TAKE ONE TABLET BY MOUTH TWICE DAILY with morning AND evening meals, Disp: , Rfl:     nitroglycerin (NITROSTAT) 0.4 MG SL tablet, Place 1 tablet under the tongue Every 5 (Five) Minutes As Needed for Chest Pain. Take no more than 3 doses in 15 minutes., Disp: 25 tablet, Rfl: 0    omega-3 acid ethyl esters (LOVAZA) 1 g capsule, Take 2 capsules by mouth 2 (Two) Times a Day., Disp: , Rfl:     OneTouch Ultra test strip, USE TO TEST BLOOD SUGAR TWICE DAILY, Disp: , Rfl:     oxyCODONE-acetaminophen (PERCOCET) 5-325 MG per tablet, Take 1 tablet by mouth Every 6 (Six) Hours As Needed., Disp: , Rfl:  "    Physical Exam:  Vital Signs:   Vitals:    07/28/23 0909   BP: 104/60   BP Location: Left arm   Patient Position: Sitting   Pulse: 88   Resp: 16   SpO2: 98%   Weight: 94.3 kg (208 lb)   Height: 165.1 cm (65\")     Body mass index is 34.61 kg/m².    Physical Exam  Vitals and nursing note reviewed.   Constitutional:       General: She is not in acute distress.     Appearance: She is obese.   HENT:      Head: Normocephalic and atraumatic.   Neck:      Trachea: Trachea normal.   Cardiovascular:      Rate and Rhythm: Normal rate and regular rhythm.      Pulses: Normal pulses.      Heart sounds: Normal heart sounds. No murmur heard.    No friction rub. No gallop.   Pulmonary:      Effort: Pulmonary effort is normal.      Breath sounds: Normal breath sounds.   Musculoskeletal:      Cervical back: Neck supple.      Right lower leg: Edema present.      Left lower leg: Edema present.      Comments: Trace edema to BLE   Skin:     General: Skin is warm and dry.   Neurological:      Mental Status: She is alert and oriented to person, place, and time.   Psychiatric:         Mood and Affect: Mood normal.         Behavior: Behavior normal. Behavior is cooperative.         Thought Content: Thought content does not include suicidal ideation.       Results Review:   I reviewed the patient's new clinical results.    Procedures    Assessment / Plan:   Diagnoses and all orders for this visit:    1. Coronary artery disease involving native coronary artery of native heart without angina pectoris (Primary)  2012, STEMI with PCI x2 to LAD  Dyspnea on exertion could be ischemic equivalent  Plan for vasodilator nuclear stress test (which patient wants to defer until after September for a [place] vacation)  Continue aspirin and Plavix  Patient knows to call back to the office or go to the ER with chest discomfort that is severe in nature or does not subside    2. Essential hypertension  Currently well controlled    3. Dyslipidemia  3/22, " triglycerides 347, HDL 30, LDL 89  LDL goal less than 70 mg/dL; we discussed this goal  Continue high intensity statin  Patient will follow-up with PCP    4. Tobacco abuse  Strongly encouraged complete smoking cessation  Patient declines smoking cessation aids at this time (states she has failed multiple pharmaceuticals)    5. Type 2 diabetes mellitus  9/18, hemoglobin A1c 8.1 (this is most recent lab available for review)  Defer to PCP/endocrinology    6.  Shortness of breath  5/21, normal LVEF  Plan for referral to pulmonology for evaluation and treatment  Proceed with nuclear stress test as previously described    Preventative Cardiology:   Tobacco Cessation: Cessation Counseling Provided    Advance Care Planning: ACP discussion was declined by the patient. Patient does not have an advance directive, declines further assistance.     Follow Up:   Patient will call to schedule her own follow-up appointment with Dr. Mazariegos after her stress test has been scheduled      Thank you for allowing me to participate in the care of your patient. Please do not hesitate to contact me with additional questions or concerns.     ARETHA Jackson

## 2023-07-28 ENCOUNTER — HOSPITAL ENCOUNTER (OUTPATIENT)
Dept: MAMMOGRAPHY | Facility: HOSPITAL | Age: 53
Discharge: HOME | End: 2023-07-28
Admitting: INTERNAL MEDICINE
Payer: MEDICARE

## 2023-07-28 ENCOUNTER — OFFICE VISIT (OUTPATIENT)
Dept: CARDIOLOGY | Facility: CLINIC | Age: 53
End: 2023-07-28
Payer: MEDICARE

## 2023-07-28 VITALS
SYSTOLIC BLOOD PRESSURE: 104 MMHG | OXYGEN SATURATION: 98 % | WEIGHT: 208 LBS | HEART RATE: 88 BPM | RESPIRATION RATE: 16 BRPM | DIASTOLIC BLOOD PRESSURE: 60 MMHG | BODY MASS INDEX: 34.66 KG/M2 | HEIGHT: 65 IN

## 2023-07-28 DIAGNOSIS — E78.5 DYSLIPIDEMIA: ICD-10-CM

## 2023-07-28 DIAGNOSIS — R06.02 SOB (SHORTNESS OF BREATH): ICD-10-CM

## 2023-07-28 DIAGNOSIS — I25.10 CORONARY ARTERY DISEASE INVOLVING NATIVE CORONARY ARTERY OF NATIVE HEART WITHOUT ANGINA PECTORIS: ICD-10-CM

## 2023-07-28 DIAGNOSIS — I10 ESSENTIAL HYPERTENSION: ICD-10-CM

## 2023-07-28 DIAGNOSIS — E11.65 UNCONTROLLED TYPE 2 DIABETES MELLITUS WITH HYPERGLYCEMIA: ICD-10-CM

## 2023-07-28 DIAGNOSIS — Z72.0 TOBACCO ABUSE: ICD-10-CM

## 2023-07-28 DIAGNOSIS — Z12.31 BREAST CANCER SCREENING BY MAMMOGRAM: ICD-10-CM

## 2023-07-28 PROCEDURE — 1160F RVW MEDS BY RX/DR IN RCRD: CPT | Performed by: NURSE PRACTITIONER

## 2023-07-28 PROCEDURE — 3074F SYST BP LT 130 MM HG: CPT | Performed by: NURSE PRACTITIONER

## 2023-07-28 PROCEDURE — 99214 OFFICE O/P EST MOD 30 MIN: CPT | Performed by: NURSE PRACTITIONER

## 2023-07-28 PROCEDURE — 77067 SCR MAMMO BI INCL CAD: CPT

## 2023-07-28 PROCEDURE — 1159F MED LIST DOCD IN RCRD: CPT | Performed by: NURSE PRACTITIONER

## 2023-07-28 PROCEDURE — 3078F DIAST BP <80 MM HG: CPT | Performed by: NURSE PRACTITIONER

## 2023-07-28 PROCEDURE — 77063 BREAST TOMOSYNTHESIS BI: CPT

## 2023-10-24 ENCOUNTER — HOSPITAL ENCOUNTER (OUTPATIENT)
Dept: NUCLEAR MEDICINE | Facility: HOSPITAL | Age: 53
Discharge: HOME | End: 2023-10-24
Payer: MEDICARE

## 2023-10-24 DIAGNOSIS — I25.10 CORONARY ARTERY DISEASE INVOLVING NATIVE CORONARY ARTERY OF NATIVE HEART WITHOUT ANGINA PECTORIS: ICD-10-CM

## 2023-10-24 PROCEDURE — 93017 CV STRESS TEST TRACING ONLY: CPT

## 2023-10-24 PROCEDURE — 0 TECHNETIUM SESTAMIBI: Performed by: NURSE PRACTITIONER

## 2023-10-24 PROCEDURE — 78452 HT MUSCLE IMAGE SPECT MULT: CPT

## 2023-10-24 PROCEDURE — 25010000002 REGADENOSON 0.4 MG/5ML SOLUTION: Performed by: NURSE PRACTITIONER

## 2023-10-24 PROCEDURE — A9500 TC99M SESTAMIBI: HCPCS | Performed by: NURSE PRACTITIONER

## 2023-10-24 RX ORDER — REGADENOSON 0.08 MG/ML
0.4 INJECTION, SOLUTION INTRAVENOUS
Status: COMPLETED | OUTPATIENT
Start: 2023-10-24 | End: 2023-10-24

## 2023-10-24 RX ADMIN — REGADENOSON 0.4 MG: 0.08 INJECTION, SOLUTION INTRAVENOUS at 08:38

## 2023-10-24 RX ADMIN — TECHNETIUM TC 99M SESTAMIBI 1 DOSE: 1 INJECTION INTRAVENOUS at 08:38

## 2023-10-25 ENCOUNTER — HOSPITAL ENCOUNTER (OUTPATIENT)
Dept: NUCLEAR MEDICINE | Facility: HOSPITAL | Age: 53
Discharge: HOME | End: 2023-10-25
Payer: MEDICARE

## 2023-10-25 ENCOUNTER — TELEPHONE (OUTPATIENT)
Dept: CARDIOLOGY | Facility: CLINIC | Age: 53
End: 2023-10-25
Payer: MEDICARE

## 2023-10-25 PROCEDURE — 0 TECHNETIUM SESTAMIBI: Performed by: NURSE PRACTITIONER

## 2023-10-25 PROCEDURE — A9500 TC99M SESTAMIBI: HCPCS | Performed by: NURSE PRACTITIONER

## 2023-10-25 RX ADMIN — TECHNETIUM TC 99M SESTAMIBI 1 DOSE: 1 INJECTION INTRAVENOUS at 07:20

## 2023-10-25 NOTE — TELEPHONE ENCOUNTER
Caller: Hector Nugent    Relationship: Self    Best call back number: [phone]     What was the call regarding: PT HAS COMPLETED HER STRESS AS OF 10/25/23, SHE CALLED TO GET SCHD FOR HER FOLLOW UP. PER THE [employer] TOOL FOR THE HUB, I GOT THE PT SCHD 1 WEEK AFTER THE STRESS. JUST MAKING OFFICE AWARE.

## 2023-10-27 ENCOUNTER — TELEPHONE (OUTPATIENT)
Dept: CARDIOLOGY | Facility: CLINIC | Age: 53
End: 2023-10-27
Payer: MEDICARE

## 2023-10-27 LAB
BH CV REST NUCLEAR ISOTOPE DOSE: 33 MCI
BH CV STRESS COMMENTS STAGE 1: NORMAL
BH CV STRESS DOSE REGADENOSON STAGE 1: 0.4
BH CV STRESS DURATION MIN STAGE 1: 0
BH CV STRESS DURATION SEC STAGE 1: 10
BH CV STRESS NUCLEAR ISOTOPE DOSE: 35 MCI
BH CV STRESS PROTOCOL 1: NORMAL
BH CV STRESS RECOVERY BP: NORMAL MMHG
BH CV STRESS RECOVERY HR: 90 BPM
BH CV STRESS STAGE 1: 1
LV EF NUC BP: 57 %
MAXIMAL PREDICTED HEART RATE: 165 BPM
PERCENT MAX PREDICTED HR: 61.82 %
STRESS BASELINE BP: NORMAL MMHG
STRESS BASELINE HR: 72 BPM
STRESS PERCENT HR: 73 %
STRESS POST PEAK BP: NORMAL MMHG
STRESS POST PEAK HR: 102 BPM
STRESS TARGET HR: 140 BPM

## 2023-10-27 NOTE — TELEPHONE ENCOUNTER
Patient is on my schedule for next week.  She has an abnormal stress test which demonstrates multivessel disease.  She must follow-up with cardiologist.  Please change this appointment and advise her of the new date/time.

## 2023-10-27 NOTE — TELEPHONE ENCOUNTER
Shannan would like for patient to follow up with her primary cardiologist for her abnormal test results. Please schedule her with Dr. Mazariegos and cancel next weeks appointment with Shannan.

## 2023-10-31 ENCOUNTER — OFFICE VISIT (OUTPATIENT)
Dept: CARDIOLOGY | Facility: CLINIC | Age: 53
End: 2023-10-31
Payer: MEDICARE

## 2023-10-31 VITALS
BODY MASS INDEX: 33.99 KG/M2 | DIASTOLIC BLOOD PRESSURE: 92 MMHG | OXYGEN SATURATION: 96 % | WEIGHT: 204 LBS | SYSTOLIC BLOOD PRESSURE: 152 MMHG | HEART RATE: 86 BPM | HEIGHT: 65 IN

## 2023-10-31 DIAGNOSIS — I10 ESSENTIAL HYPERTENSION: ICD-10-CM

## 2023-10-31 DIAGNOSIS — E11.65 UNCONTROLLED TYPE 2 DIABETES MELLITUS WITH HYPERGLYCEMIA: ICD-10-CM

## 2023-10-31 DIAGNOSIS — I25.10 CORONARY ARTERY DISEASE INVOLVING NATIVE CORONARY ARTERY OF NATIVE HEART WITHOUT ANGINA PECTORIS: Primary | ICD-10-CM

## 2023-10-31 DIAGNOSIS — R94.39 ABNORMAL CARDIOVASCULAR STRESS TEST: ICD-10-CM

## 2023-10-31 DIAGNOSIS — E66.01 SEVERE OBESITY (BMI 35.0-39.9) WITH COMORBIDITY: ICD-10-CM

## 2023-10-31 DIAGNOSIS — E78.5 DYSLIPIDEMIA: ICD-10-CM

## 2023-10-31 RX ORDER — LOSARTAN POTASSIUM 100 MG/1
100 TABLET ORAL DAILY
Qty: 90 TABLET | Refills: 3 | Status: SHIPPED | OUTPATIENT
Start: 2023-10-31

## 2023-10-31 RX ORDER — CYCLOBENZAPRINE HCL 10 MG
1 TABLET ORAL 3 TIMES DAILY
COMMUNITY
Start: 2023-07-24

## 2023-10-31 RX ORDER — ISOSORBIDE MONONITRATE 30 MG/1
30 TABLET, EXTENDED RELEASE ORAL EVERY MORNING
Qty: 90 TABLET | Refills: 3 | Status: SHIPPED | OUTPATIENT
Start: 2023-10-31

## 2023-10-31 RX ORDER — EPINEPHRINE 0.3 MG/.3ML
INJECTION SUBCUTANEOUS
COMMUNITY
Start: 2023-10-13

## 2023-10-31 RX ORDER — CARVEDILOL 25 MG/1
25 TABLET ORAL 2 TIMES DAILY
Qty: 60 TABLET | Refills: 11 | Status: SHIPPED | OUTPATIENT
Start: 2023-10-31

## 2023-10-31 NOTE — PROGRESS NOTES
Subjective:     Encounter Date:10/31/2023      Patient ID: Hector Nugent is a 53 y.o. female.    Chief Complaint: Abnormal stress test  HPI  This is a 53-year-old female patient with known coronary artery disease who presents to cardiology clinic to discuss results of outpatient testing to evaluate chest discomfort.  The patient had a high risk abnormal vasodilator nuclear stress test demonstrating multivessel ischemia.  There has been substantial progression in the overall ischemic burden compared to a nuclear stress test done in 2018.  The patient apparently had invasive coronary angiography at some point prior to 2015 demonstrating severe coronary artery disease.  Records are unavailable for review.  She has been noncompliant with some of her medical therapy.  She continues to smoke daily.  She adamantly denies recent chest discomfort either at rest or with activity.  She works daily without exertional symptoms or limitations.  She indicates she has not used any sublingual nitroglycerin in the last 2 months.  The following portions of the patient's history were reviewed and updated as appropriate: allergies, current medications, past family history, past medical history, past social history, past surgical history and problem  Review of Systems   Constitutional: Negative for chills, diaphoresis, fever, malaise/fatigue, weight gain and weight loss.   HENT:  Negative for ear discharge, hearing loss, hoarse voice and nosebleeds.    Eyes:  Negative for discharge, double vision, pain and photophobia.   Cardiovascular:  Negative for chest pain, claudication, cyanosis, dyspnea on exertion, irregular heartbeat, leg swelling, near-syncope, orthopnea, palpitations, paroxysmal nocturnal dyspnea and syncope.   Respiratory:  Negative for cough, hemoptysis, sputum production and wheezing.    Endocrine: Negative for cold intolerance, heat intolerance, polydipsia, polyphagia and polyuria.   Hematologic/Lymphatic: Negative  for adenopathy and bleeding problem. Does not bruise/bleed easily.   Skin:  Negative for color change, flushing, itching and rash.   Musculoskeletal:  Negative for muscle cramps, muscle weakness, myalgias and stiffness.   Gastrointestinal:  Negative for abdominal pain, diarrhea, hematemesis, hematochezia, nausea and vomiting.   Genitourinary:  Negative for dysuria, frequency and nocturia.   Neurological:  Negative for focal weakness, loss of balance, numbness, paresthesias and seizures.   Psychiatric/Behavioral:  Negative for altered mental status, hallucinations and suicidal ideas.    Allergic/Immunologic: Negative for HIV exposure, hives and persistent infections.           Current Outpatient Medications:     Allergy Relief 10 MG tablet, Take 1 tablet by mouth Daily., Disp: , Rfl:     amLODIPine (NORVASC) 10 MG tablet, Take 1 tablet by mouth Daily., Disp: 30 tablet, Rfl: 11    aspirin 81 MG chewable tablet, Chew 1 tablet Daily. DNS PER DR. ROWLEY, Disp: , Rfl:     atorvastatin (LIPITOR) 80 MG tablet, Take 1 tablet by mouth Daily., Disp: , Rfl:     B-D ULTRAFINE III SHORT PEN 31G X 8 MM misc, 2 (Two) Times a Day., Disp: , Rfl:     clopidogrel (PLAVIX) 75 MG tablet, Take 1 tablet by mouth Daily., Disp: , Rfl:     cyclobenzaprine (FLEXERIL) 10 MG tablet, Take 1 tablet by mouth 3 (Three) Times a Day., Disp: , Rfl:     EPINEPHrine (EPIPEN) 0.3 MG/0.3ML solution auto-injector injection, , Disp: , Rfl:     estradiol (ESTRACE) 0.5 MG tablet, Take 1 tablet by mouth Daily., Disp: , Rfl:     gabapentin (NEURONTIN) 600 MG tablet, , Disp: , Rfl:     gemfibrozil (LOPID) 600 MG tablet, TAKE ONE TABLET BY MOUTH TWICE DAILY 30 minutes BEFORE morning AND evening meal, Disp: , Rfl:     Invokana 300 MG tablet tablet, TAKE ONE TABLET BY MOUTH EVERY DAY BEFORE first meal of DAY, Disp: , Rfl:     Januvia 100 MG tablet, Take 1 tablet by mouth Daily., Disp: , Rfl:     Lantus SoloStar 100 UNIT/ML injection pen, inject 30 units by  "subcutaneous route DAILY, Disp: , Rfl:     metFORMIN (GLUCOPHAGE) 1000 MG tablet, TAKE ONE TABLET BY MOUTH TWICE DAILY with morning AND evening meals, Disp: , Rfl:     nitroglycerin (NITROSTAT) 0.4 MG SL tablet, Place 1 tablet under the tongue Every 5 (Five) Minutes As Needed for Chest Pain. Take no more than 3 doses in 15 minutes., Disp: 25 tablet, Rfl: 0    OneTouch Ultra test strip, USE TO TEST BLOOD SUGAR TWICE DAILY, Disp: , Rfl:     oxyCODONE-acetaminophen (PERCOCET) 5-325 MG per tablet, Take 1 tablet by mouth Every 6 (Six) Hours As Needed., Disp: , Rfl:     carvedilol (COREG) 25 MG tablet, Take 1 tablet by mouth 2 (Two) Times a Day., Disp: 60 tablet, Rfl: 11    isosorbide mononitrate (IMDUR) 30 MG 24 hr tablet, Take 1 tablet by mouth Every Morning., Disp: 90 tablet, Rfl: 3    losartan (Cozaar) 100 MG tablet, Take 1 tablet by mouth Daily., Disp: 90 tablet, Rfl: 3    Objective:   Vitals and nursing note reviewed.   Constitutional:       Appearance: Healthy appearance. Not in distress.   Neck:      Vascular: No JVR. JVD normal.   Pulmonary:      Effort: Pulmonary effort is normal.      Breath sounds: Normal breath sounds. No wheezing. No rhonchi. No rales.   Chest:      Chest wall: Not tender to palpatation.   Cardiovascular:      PMI at left midclavicular line. Normal rate. Regular rhythm. Normal S1. Normal S2.       Murmurs: There is no murmur.      No gallop.  No click. No rub.   Pulses:     Intact distal pulses.   Edema:     Peripheral edema absent.   Abdominal:      General: Bowel sounds are normal.      Palpations: Abdomen is soft.      Tenderness: There is no abdominal tenderness.   Musculoskeletal: Normal range of motion.         General: No tenderness. Skin:     General: Skin is warm and dry.   Neurological:      General: No focal deficit present.      Mental Status: Alert and oriented to person, place and time.       Blood pressure 152/92, pulse 86, height 165.1 cm (65\"), weight 92.5 kg (204 lb), last " menstrual period 09/03/2018, SpO2 96%.   Lab Review:     Assessment:       1. Coronary artery disease involving native coronary artery of native heart without angina pectoris  High risk abnormal vasodilator nuclear stress test suggesting ischemia in all 3 major vascular distributions.  Clear progression of ischemic burden compared to nuclear stress testing 5 years ago.    2. Dyslipidemia  The patient is prescribed high intensity statin based cholesterol-lowering therapy.    3. Essential hypertension  Poor blood pressure control.  Medication noncompliance.    4. Severe obesity (BMI 35.0-39.9) with comorbidity      5. Uncontrolled type 2 diabetes mellitus with hyperglycemia  Typical obesity related insulin resistance.    Procedures    Plan:     I have recommended invasive coronary angiography with eye towards potential surgical coronary revascularization.  The patient does not wish to pursue further catheterization due to complications which was encountered during her last heart catheterization prior to 2015 resulting in need for emergency intubation and mechanical ventilation.    The patient has been counseled extensively regarding the essential need to discontinue cigarette smoking.    I have recommended increasing Coreg to 25 mg orally twice daily.    I have recommended increasing losartan to 100 mg orally once per day.    Amlodipine will remain unchanged at 10 mg/day.    Dual antiplatelet therapy with aspirin and Plavix will continue uninterrupted.    I have recommended starting Imdur 30 mg orally once per day.  She is instructed to take this first thing in the morning regardless of whether she is having chest discomfort or not.  She is instructed that she continues to use her sublingual nitroglycerin on an as-needed basis.    She will follow-up in 2 months to reassess symptoms.    The patient is instructed to report to the emergency room immediately for recurrent symptoms.    The patient has been counseled  regarding the essential need to maintain strict compliance with her medication taking the same medication at the same time each and every day.

## 2023-11-25 ENCOUNTER — HOSPITAL ENCOUNTER (EMERGENCY)
Facility: HOSPITAL | Age: 53
Discharge: HOME | End: 2023-11-25
Attending: EMERGENCY MEDICINE
Payer: MEDICARE

## 2023-11-25 ENCOUNTER — APPOINTMENT (OUTPATIENT)
Dept: CT IMAGING | Facility: HOSPITAL | Age: 53
End: 2023-11-25
Payer: MEDICARE

## 2023-11-25 VITALS
RESPIRATION RATE: 18 BRPM | HEART RATE: 100 BPM | TEMPERATURE: 98.1 F | BODY MASS INDEX: 33.99 KG/M2 | SYSTOLIC BLOOD PRESSURE: 157 MMHG | DIASTOLIC BLOOD PRESSURE: 88 MMHG | WEIGHT: 204 LBS | OXYGEN SATURATION: 96 % | HEIGHT: 65 IN

## 2023-11-25 DIAGNOSIS — M54.50 LOW BACK PAIN, UNSPECIFIED BACK PAIN LATERALITY, UNSPECIFIED CHRONICITY, UNSPECIFIED WHETHER SCIATICA PRESENT: Primary | ICD-10-CM

## 2023-11-25 LAB
ALBUMIN SERPL-MCNC: 4.1 G/DL (ref 3.5–5.2)
ALBUMIN/GLOB SERPL: 1.2 G/DL
ALP SERPL-CCNC: 107 U/L (ref 39–117)
ALT SERPL W P-5'-P-CCNC: 42 U/L (ref 1–33)
ANION GAP SERPL CALCULATED.3IONS-SCNC: 11.2 MMOL/L (ref 5–15)
AST SERPL-CCNC: 27 U/L (ref 1–32)
BASOPHILS # BLD AUTO: 0.08 10*3/MM3 (ref 0–0.2)
BASOPHILS NFR BLD AUTO: 0.8 % (ref 0–1.5)
BILIRUB SERPL-MCNC: 0.2 MG/DL (ref 0–1.2)
BILIRUB UR QL STRIP: NEGATIVE
BUN SERPL-MCNC: 8 MG/DL (ref 6–20)
BUN/CREAT SERPL: 10.3 (ref 7–25)
CALCIUM SPEC-SCNC: 8.9 MG/DL (ref 8.6–10.5)
CHLORIDE SERPL-SCNC: 100 MMOL/L (ref 98–107)
CLARITY UR: CLEAR
CO2 SERPL-SCNC: 23.8 MMOL/L (ref 22–29)
COLOR UR: YELLOW
CREAT SERPL-MCNC: 0.78 MG/DL (ref 0.57–1)
DEPRECATED RDW RBC AUTO: 37.7 FL (ref 37–54)
EGFRCR SERPLBLD CKD-EPI 2021: 89.8 ML/MIN/1.73
EOSINOPHIL # BLD AUTO: 0.08 10*3/MM3 (ref 0–0.4)
EOSINOPHIL NFR BLD AUTO: 0.8 % (ref 0.3–6.2)
ERYTHROCYTE [DISTWIDTH] IN BLOOD BY AUTOMATED COUNT: 11.7 % (ref 12.3–15.4)
GLOBULIN UR ELPH-MCNC: 3.3 GM/DL
GLUCOSE SERPL-MCNC: 476 MG/DL (ref 65–99)
GLUCOSE UR STRIP-MCNC: ABNORMAL MG/DL
HCT VFR BLD AUTO: 45 % (ref 34–46.6)
HGB BLD-MCNC: 15.1 G/DL (ref 12–15.9)
HGB UR QL STRIP.AUTO: NEGATIVE
HOLD SPECIMEN: NORMAL
HOLD SPECIMEN: NORMAL
IMM GRANULOCYTES # BLD AUTO: 0.04 10*3/MM3 (ref 0–0.05)
IMM GRANULOCYTES NFR BLD AUTO: 0.4 % (ref 0–0.5)
KETONES UR QL STRIP: NEGATIVE
LEUKOCYTE ESTERASE UR QL STRIP.AUTO: NEGATIVE
LIPASE SERPL-CCNC: 25 U/L (ref 13–60)
LYMPHOCYTES # BLD AUTO: 2.02 10*3/MM3 (ref 0.7–3.1)
LYMPHOCYTES NFR BLD AUTO: 21.4 % (ref 19.6–45.3)
MCH RBC QN AUTO: 29.5 PG (ref 26.6–33)
MCHC RBC AUTO-ENTMCNC: 33.6 G/DL (ref 31.5–35.7)
MCV RBC AUTO: 87.9 FL (ref 79–97)
MONOCYTES # BLD AUTO: 0.58 10*3/MM3 (ref 0.1–0.9)
MONOCYTES NFR BLD AUTO: 6.1 % (ref 5–12)
NEUTROPHILS NFR BLD AUTO: 6.65 10*3/MM3 (ref 1.7–7)
NEUTROPHILS NFR BLD AUTO: 70.5 % (ref 42.7–76)
NITRITE UR QL STRIP: NEGATIVE
NRBC BLD AUTO-RTO: 0 /100 WBC (ref 0–0.2)
PH UR STRIP.AUTO: <=5 [PH] (ref 5–8)
PLATELET # BLD AUTO: 215 10*3/MM3 (ref 140–450)
PMV BLD AUTO: 10.5 FL (ref 6–12)
POTASSIUM SERPL-SCNC: 3.9 MMOL/L (ref 3.5–5.2)
PROT SERPL-MCNC: 7.4 G/DL (ref 6–8.5)
PROT UR QL STRIP: NEGATIVE
RBC # BLD AUTO: 5.12 10*6/MM3 (ref 3.77–5.28)
SODIUM SERPL-SCNC: 135 MMOL/L (ref 136–145)
SP GR UR STRIP: >=1.03 (ref 1–1.03)
UROBILINOGEN UR QL STRIP: ABNORMAL
WBC NRBC COR # BLD AUTO: 9.45 10*3/MM3 (ref 3.4–10.8)
WHOLE BLOOD HOLD COAG: NORMAL
WHOLE BLOOD HOLD SPECIMEN: NORMAL

## 2023-11-25 PROCEDURE — 83690 ASSAY OF LIPASE: CPT

## 2023-11-25 PROCEDURE — 96374 THER/PROPH/DIAG INJ IV PUSH: CPT

## 2023-11-25 PROCEDURE — 96375 TX/PRO/DX INJ NEW DRUG ADDON: CPT

## 2023-11-25 PROCEDURE — 85025 COMPLETE CBC W/AUTO DIFF WBC: CPT

## 2023-11-25 PROCEDURE — 99284 EMERGENCY DEPT VISIT MOD MDM: CPT

## 2023-11-25 PROCEDURE — 25010000002 MORPHINE PER 10 MG: Performed by: EMERGENCY MEDICINE

## 2023-11-25 PROCEDURE — 74176 CT ABD & PELVIS W/O CONTRAST: CPT

## 2023-11-25 PROCEDURE — 81003 URINALYSIS AUTO W/O SCOPE: CPT | Performed by: EMERGENCY MEDICINE

## 2023-11-25 PROCEDURE — 72131 CT LUMBAR SPINE W/O DYE: CPT

## 2023-11-25 PROCEDURE — 80053 COMPREHEN METABOLIC PANEL: CPT | Performed by: EMERGENCY MEDICINE

## 2023-11-25 PROCEDURE — 36415 COLL VENOUS BLD VENIPUNCTURE: CPT

## 2023-11-25 PROCEDURE — 25010000002 KETOROLAC TROMETHAMINE PER 15 MG: Performed by: EMERGENCY MEDICINE

## 2023-11-25 RX ORDER — LIDOCAINE 50 MG/G
1 PATCH TOPICAL ONCE
Status: DISCONTINUED | OUTPATIENT
Start: 2023-11-25 | End: 2023-11-26 | Stop reason: HOSPADM

## 2023-11-25 RX ORDER — CYCLOBENZAPRINE HCL 10 MG
10 TABLET ORAL 3 TIMES DAILY PRN
Qty: 15 TABLET | Refills: 0 | Status: SHIPPED | OUTPATIENT
Start: 2023-11-25

## 2023-11-25 RX ORDER — KETOROLAC TROMETHAMINE 30 MG/ML
10 INJECTION, SOLUTION INTRAMUSCULAR; INTRAVENOUS ONCE
Status: COMPLETED | OUTPATIENT
Start: 2023-11-25 | End: 2023-11-25

## 2023-11-25 RX ORDER — LIDOCAINE 50 MG/G
1 PATCH TOPICAL EVERY 24 HOURS
Qty: 6 EACH | Refills: 0 | Status: SHIPPED | OUTPATIENT
Start: 2023-11-25

## 2023-11-25 RX ORDER — NAPROXEN 500 MG/1
500 TABLET ORAL 2 TIMES DAILY WITH MEALS
Qty: 14 TABLET | Refills: 0 | Status: SHIPPED | OUTPATIENT
Start: 2023-11-25

## 2023-11-25 RX ORDER — SODIUM CHLORIDE 0.9 % (FLUSH) 0.9 %
10 SYRINGE (ML) INJECTION AS NEEDED
Status: DISCONTINUED | OUTPATIENT
Start: 2023-11-25 | End: 2023-11-26 | Stop reason: HOSPADM

## 2023-11-25 RX ADMIN — KETOROLAC TROMETHAMINE 10 MG: 30 INJECTION, SOLUTION INTRAMUSCULAR; INTRAVENOUS at 20:43

## 2023-11-25 RX ADMIN — LIDOCAINE 1 PATCH: 700 PATCH TOPICAL at 20:43

## 2023-11-25 RX ADMIN — MORPHINE SULFATE 4 MG: 4 INJECTION, SOLUTION INTRAMUSCULAR; INTRAVENOUS at 20:43

## 2023-11-26 NOTE — ED NOTES
Patient requested something to drink, educated patient on drinking before scan is read. Brought to MD, provider communicated that patient can have something. Nurse communicated also about patient being given water
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
## 2024-03-25 ENCOUNTER — HOSPITAL ENCOUNTER (INPATIENT)
Facility: HOSPITAL | Age: 54
LOS: 1 days | Discharge: LEFT BEFORE DISCHARGE | DRG: 280 | End: 2024-03-26
Attending: STUDENT IN AN ORGANIZED HEALTH CARE EDUCATION/TRAINING PROGRAM | Admitting: STUDENT IN AN ORGANIZED HEALTH CARE EDUCATION/TRAINING PROGRAM
Payer: MEDICARE

## 2024-03-25 ENCOUNTER — APPOINTMENT (OUTPATIENT)
Dept: GENERAL RADIOLOGY | Facility: HOSPITAL | Age: 54
DRG: 280 | End: 2024-03-25
Payer: MEDICARE

## 2024-03-25 DIAGNOSIS — J96.01 ACUTE RESPIRATORY FAILURE WITH HYPOXIA: ICD-10-CM

## 2024-03-25 DIAGNOSIS — I21.4 NSTEMI (NON-ST ELEVATED MYOCARDIAL INFARCTION): Primary | ICD-10-CM

## 2024-03-25 DIAGNOSIS — J81.0 ACUTE PULMONARY EDEMA: ICD-10-CM

## 2024-03-25 DIAGNOSIS — I50.9 ACUTE ON CHRONIC CONGESTIVE HEART FAILURE, UNSPECIFIED HEART FAILURE TYPE: ICD-10-CM

## 2024-03-25 DIAGNOSIS — I16.1 HYPERTENSIVE EMERGENCY: ICD-10-CM

## 2024-03-25 DIAGNOSIS — D72.829 LEUKOCYTOSIS, UNSPECIFIED TYPE: ICD-10-CM

## 2024-03-25 LAB
BASOPHILS # BLD AUTO: 0.1 10*3/MM3 (ref 0–0.2)
BASOPHILS NFR BLD AUTO: 0.6 % (ref 0–1.5)
DEPRECATED RDW RBC AUTO: 38.4 FL (ref 37–54)
EOSINOPHIL # BLD AUTO: 0.23 10*3/MM3 (ref 0–0.4)
EOSINOPHIL NFR BLD AUTO: 1.3 % (ref 0.3–6.2)
ERYTHROCYTE [DISTWIDTH] IN BLOOD BY AUTOMATED COUNT: 11.9 % (ref 12.3–15.4)
HCT VFR BLD AUTO: 45.3 % (ref 34–46.6)
HGB BLD-MCNC: 15.5 G/DL (ref 12–15.9)
IMM GRANULOCYTES # BLD AUTO: 0.08 10*3/MM3 (ref 0–0.05)
IMM GRANULOCYTES NFR BLD AUTO: 0.5 % (ref 0–0.5)
LYMPHOCYTES # BLD AUTO: 3.87 10*3/MM3 (ref 0.7–3.1)
LYMPHOCYTES NFR BLD AUTO: 22.3 % (ref 19.6–45.3)
MCH RBC QN AUTO: 30 PG (ref 26.6–33)
MCHC RBC AUTO-ENTMCNC: 34.2 G/DL (ref 31.5–35.7)
MCV RBC AUTO: 87.6 FL (ref 79–97)
MONOCYTES # BLD AUTO: 1.06 10*3/MM3 (ref 0.1–0.9)
MONOCYTES NFR BLD AUTO: 6.1 % (ref 5–12)
NEUTROPHILS NFR BLD AUTO: 11.98 10*3/MM3 (ref 1.7–7)
NEUTROPHILS NFR BLD AUTO: 69.2 % (ref 42.7–76)
NRBC BLD AUTO-RTO: 0 /100 WBC (ref 0–0.2)
PLATELET # BLD AUTO: 309 10*3/MM3 (ref 140–450)
PMV BLD AUTO: 10.3 FL (ref 6–12)
RBC # BLD AUTO: 5.17 10*6/MM3 (ref 3.77–5.28)
WBC NRBC COR # BLD AUTO: 17.32 10*3/MM3 (ref 3.4–10.8)

## 2024-03-25 PROCEDURE — 87636 SARSCOV2 & INF A&B AMP PRB: CPT | Performed by: STUDENT IN AN ORGANIZED HEALTH CARE EDUCATION/TRAINING PROGRAM

## 2024-03-25 PROCEDURE — 85610 PROTHROMBIN TIME: CPT | Performed by: STUDENT IN AN ORGANIZED HEALTH CARE EDUCATION/TRAINING PROGRAM

## 2024-03-25 PROCEDURE — 36415 COLL VENOUS BLD VENIPUNCTURE: CPT

## 2024-03-25 PROCEDURE — 85025 COMPLETE CBC W/AUTO DIFF WBC: CPT | Performed by: STUDENT IN AN ORGANIZED HEALTH CARE EDUCATION/TRAINING PROGRAM

## 2024-03-25 PROCEDURE — 93005 ELECTROCARDIOGRAM TRACING: CPT | Performed by: STUDENT IN AN ORGANIZED HEALTH CARE EDUCATION/TRAINING PROGRAM

## 2024-03-25 PROCEDURE — 71045 X-RAY EXAM CHEST 1 VIEW: CPT

## 2024-03-25 PROCEDURE — 99291 CRITICAL CARE FIRST HOUR: CPT

## 2024-03-25 PROCEDURE — 84484 ASSAY OF TROPONIN QUANT: CPT | Performed by: STUDENT IN AN ORGANIZED HEALTH CARE EDUCATION/TRAINING PROGRAM

## 2024-03-25 PROCEDURE — 83880 ASSAY OF NATRIURETIC PEPTIDE: CPT | Performed by: STUDENT IN AN ORGANIZED HEALTH CARE EDUCATION/TRAINING PROGRAM

## 2024-03-25 PROCEDURE — 80053 COMPREHEN METABOLIC PANEL: CPT | Performed by: STUDENT IN AN ORGANIZED HEALTH CARE EDUCATION/TRAINING PROGRAM

## 2024-03-25 PROCEDURE — 85730 THROMBOPLASTIN TIME PARTIAL: CPT | Performed by: STUDENT IN AN ORGANIZED HEALTH CARE EDUCATION/TRAINING PROGRAM

## 2024-03-25 RX ORDER — SODIUM CHLORIDE 0.9 % (FLUSH) 0.9 %
10 SYRINGE (ML) INJECTION AS NEEDED
Status: DISCONTINUED | OUTPATIENT
Start: 2024-03-25 | End: 2024-03-26 | Stop reason: HOSPADM

## 2024-03-25 NOTE — Clinical Note
Hemostasis started on the left radial artery. R-Band was used in achieving hemostasis. Radial compression device applied to vessel. Hemostasis achieved successfully. Closure device additional comment: 10 mL 2179

## 2024-03-26 ENCOUNTER — APPOINTMENT (OUTPATIENT)
Dept: CARDIOLOGY | Facility: HOSPITAL | Age: 54
DRG: 280 | End: 2024-03-26
Payer: MEDICARE

## 2024-03-26 ENCOUNTER — APPOINTMENT (OUTPATIENT)
Dept: CT IMAGING | Facility: HOSPITAL | Age: 54
DRG: 280 | End: 2024-03-26
Payer: MEDICARE

## 2024-03-26 VITALS
HEIGHT: 65 IN | TEMPERATURE: 97.7 F | OXYGEN SATURATION: 95 % | HEART RATE: 87 BPM | BODY MASS INDEX: 33.13 KG/M2 | RESPIRATION RATE: 12 BRPM | SYSTOLIC BLOOD PRESSURE: 88 MMHG | WEIGHT: 198.85 LBS | DIASTOLIC BLOOD PRESSURE: 54 MMHG

## 2024-03-26 PROBLEM — I21.4 NSTEMI (NON-ST ELEVATED MYOCARDIAL INFARCTION): Status: ACTIVE | Noted: 2024-03-26

## 2024-03-26 LAB
ALBUMIN SERPL-MCNC: 4 G/DL (ref 3.5–5.2)
ALBUMIN/GLOB SERPL: 1.2 G/DL
ALP SERPL-CCNC: 105 U/L (ref 39–117)
ALT SERPL W P-5'-P-CCNC: 44 U/L (ref 1–33)
ANION GAP SERPL CALCULATED.3IONS-SCNC: 14 MMOL/L (ref 5–15)
ANION GAP SERPL CALCULATED.3IONS-SCNC: 15.9 MMOL/L (ref 5–15)
APTT PPP: 31.6 SECONDS (ref 70–100)
AST SERPL-CCNC: 49 U/L (ref 1–32)
BASOPHILS # BLD AUTO: 0.07 10*3/MM3 (ref 0–0.2)
BASOPHILS # BLD AUTO: 0.09 10*3/MM3 (ref 0–0.2)
BASOPHILS NFR BLD AUTO: 0.5 % (ref 0–1.5)
BASOPHILS NFR BLD AUTO: 0.6 % (ref 0–1.5)
BH CV ECHO MEAS - AO MAX PG: 7.4 MMHG
BH CV ECHO MEAS - AO MEAN PG: 4 MMHG
BH CV ECHO MEAS - AO ROOT DIAM: 2.8 CM
BH CV ECHO MEAS - AO V2 MAX: 136 CM/SEC
BH CV ECHO MEAS - AO V2 VTI: 28.4 CM
BH CV ECHO MEAS - AVA(I,D): 1.47 CM2
BH CV ECHO MEAS - EDV(CUBED): 157.5 ML
BH CV ECHO MEAS - EDV(MOD-SP2): 133 ML
BH CV ECHO MEAS - EDV(MOD-SP4): 131 ML
BH CV ECHO MEAS - EF(MOD-BP): 37.8 %
BH CV ECHO MEAS - EF(MOD-SP2): 30.8 %
BH CV ECHO MEAS - EF(MOD-SP4): 44.2 %
BH CV ECHO MEAS - ESV(CUBED): 82.3 ML
BH CV ECHO MEAS - ESV(MOD-SP2): 92.1 ML
BH CV ECHO MEAS - ESV(MOD-SP4): 73.1 ML
BH CV ECHO MEAS - FS: 19.4 %
BH CV ECHO MEAS - IVS/LVPW: 0.99 CM
BH CV ECHO MEAS - IVSD: 1.04 CM
BH CV ECHO MEAS - LA DIMENSION: 3.5 CM
BH CV ECHO MEAS - LAT PEAK E' VEL: 7.5 CM/SEC
BH CV ECHO MEAS - LV MASS(C)D: 219.2 GRAMS
BH CV ECHO MEAS - LV MAX PG: 2.06 MMHG
BH CV ECHO MEAS - LV MEAN PG: 1 MMHG
BH CV ECHO MEAS - LV V1 MAX: 71.8 CM/SEC
BH CV ECHO MEAS - LV V1 VTI: 16 CM
BH CV ECHO MEAS - LVIDD: 5.4 CM
BH CV ECHO MEAS - LVIDS: 4.4 CM
BH CV ECHO MEAS - LVOT AREA: 2.6 CM2
BH CV ECHO MEAS - LVOT DIAM: 1.82 CM
BH CV ECHO MEAS - LVPWD: 1.05 CM
BH CV ECHO MEAS - MED PEAK E' VEL: 5.8 CM/SEC
BH CV ECHO MEAS - MV A MAX VEL: 103 CM/SEC
BH CV ECHO MEAS - MV DEC SLOPE: 312 CM/SEC2
BH CV ECHO MEAS - MV DEC TIME: 0.27 SEC
BH CV ECHO MEAS - MV E MAX VEL: 85.1 CM/SEC
BH CV ECHO MEAS - MV E/A: 0.83
BH CV ECHO MEAS - MV MAX PG: 4.1 MMHG
BH CV ECHO MEAS - MV MEAN PG: 3 MMHG
BH CV ECHO MEAS - MV V2 VTI: 23.1 CM
BH CV ECHO MEAS - MVA(VTI): 1.8 CM2
BH CV ECHO MEAS - PA ACC TIME: 0.11 SEC
BH CV ECHO MEAS - PA V2 MAX: 93.3 CM/SEC
BH CV ECHO MEAS - RAP SYSTOLE: 3 MMHG
BH CV ECHO MEAS - RV MAX PG: 2.7 MMHG
BH CV ECHO MEAS - RV V1 MAX: 82.1 CM/SEC
BH CV ECHO MEAS - RV V1 VTI: 14.3 CM
BH CV ECHO MEAS - SV(LVOT): 41.6 ML
BH CV ECHO MEAS - SV(MOD-SP2): 40.9 ML
BH CV ECHO MEAS - SV(MOD-SP4): 57.9 ML
BH CV ECHO MEAS - TAPSE (>1.6): 1.91 CM
BH CV ECHO MEASUREMENTS AVERAGE E/E' RATIO: 12.8
BH CV XLRA - RV BASE: 3 CM
BH CV XLRA - RV LENGTH: 7.1 CM
BH CV XLRA - RV MID: 3.4 CM
BH CV XLRA - TDI S': 9.7 CM/SEC
BILIRUB SERPL-MCNC: 0.5 MG/DL (ref 0–1.2)
BUN SERPL-MCNC: 10 MG/DL (ref 6–20)
BUN SERPL-MCNC: 12 MG/DL (ref 6–20)
BUN/CREAT SERPL: 13.7 (ref 7–25)
BUN/CREAT SERPL: 17.9 (ref 7–25)
CALCIUM SPEC-SCNC: 8.8 MG/DL (ref 8.6–10.5)
CALCIUM SPEC-SCNC: 9.1 MG/DL (ref 8.6–10.5)
CHLORIDE SERPL-SCNC: 97 MMOL/L (ref 98–107)
CHLORIDE SERPL-SCNC: 98 MMOL/L (ref 98–107)
CO2 SERPL-SCNC: 24 MMOL/L (ref 22–29)
CO2 SERPL-SCNC: 24.1 MMOL/L (ref 22–29)
CREAT SERPL-MCNC: 0.67 MG/DL (ref 0.57–1)
CREAT SERPL-MCNC: 0.73 MG/DL (ref 0.57–1)
DEPRECATED RDW RBC AUTO: 38.4 FL (ref 37–54)
DEPRECATED RDW RBC AUTO: 38.6 FL (ref 37–54)
EGFRCR SERPLBLD CKD-EPI 2021: 103.4 ML/MIN/1.73
EGFRCR SERPLBLD CKD-EPI 2021: 97.3 ML/MIN/1.73
EOSINOPHIL # BLD AUTO: 0.02 10*3/MM3 (ref 0–0.4)
EOSINOPHIL # BLD AUTO: 0.03 10*3/MM3 (ref 0–0.4)
EOSINOPHIL NFR BLD AUTO: 0.1 % (ref 0.3–6.2)
EOSINOPHIL NFR BLD AUTO: 0.2 % (ref 0.3–6.2)
ERYTHROCYTE [DISTWIDTH] IN BLOOD BY AUTOMATED COUNT: 12 % (ref 12.3–15.4)
ERYTHROCYTE [DISTWIDTH] IN BLOOD BY AUTOMATED COUNT: 12 % (ref 12.3–15.4)
FLUAV RNA RESP QL NAA+PROBE: NOT DETECTED
FLUBV RNA RESP QL NAA+PROBE: NOT DETECTED
GEN 5 2HR TROPONIN T REFLEX: 285 NG/L
GLOBULIN UR ELPH-MCNC: 3.4 GM/DL
GLUCOSE BLDC GLUCOMTR-MCNC: 220 MG/DL (ref 70–130)
GLUCOSE SERPL-MCNC: 364 MG/DL (ref 65–99)
GLUCOSE SERPL-MCNC: 423 MG/DL (ref 65–99)
HBA1C MFR BLD: 10.4 % (ref 4.8–5.6)
HCT VFR BLD AUTO: 46.1 % (ref 34–46.6)
HCT VFR BLD AUTO: 46.7 % (ref 34–46.6)
HGB BLD-MCNC: 15.5 G/DL (ref 12–15.9)
HGB BLD-MCNC: 15.8 G/DL (ref 12–15.9)
HOLD SPECIMEN: NORMAL
HOLD SPECIMEN: NORMAL
IMM GRANULOCYTES # BLD AUTO: 0.06 10*3/MM3 (ref 0–0.05)
IMM GRANULOCYTES # BLD AUTO: 0.07 10*3/MM3 (ref 0–0.05)
IMM GRANULOCYTES NFR BLD AUTO: 0.4 % (ref 0–0.5)
IMM GRANULOCYTES NFR BLD AUTO: 0.5 % (ref 0–0.5)
INR PPP: 1 (ref 0.9–1.1)
LEFT ATRIUM VOLUME INDEX: 21.7 ML/M2
LV EF 2D ECHO EST: 38 %
LYMPHOCYTES # BLD AUTO: 1.52 10*3/MM3 (ref 0.7–3.1)
LYMPHOCYTES # BLD AUTO: 1.8 10*3/MM3 (ref 0.7–3.1)
LYMPHOCYTES NFR BLD AUTO: 10.2 % (ref 19.6–45.3)
LYMPHOCYTES NFR BLD AUTO: 13.1 % (ref 19.6–45.3)
MCH RBC QN AUTO: 29.5 PG (ref 26.6–33)
MCH RBC QN AUTO: 29.6 PG (ref 26.6–33)
MCHC RBC AUTO-ENTMCNC: 33.6 G/DL (ref 31.5–35.7)
MCHC RBC AUTO-ENTMCNC: 33.8 G/DL (ref 31.5–35.7)
MCV RBC AUTO: 87.3 FL (ref 79–97)
MCV RBC AUTO: 88.1 FL (ref 79–97)
MONOCYTES # BLD AUTO: 0.88 10*3/MM3 (ref 0.1–0.9)
MONOCYTES # BLD AUTO: 1.04 10*3/MM3 (ref 0.1–0.9)
MONOCYTES NFR BLD AUTO: 5.9 % (ref 5–12)
MONOCYTES NFR BLD AUTO: 7.6 % (ref 5–12)
NEUTROPHILS NFR BLD AUTO: 10.71 10*3/MM3 (ref 1.7–7)
NEUTROPHILS NFR BLD AUTO: 12.33 10*3/MM3 (ref 1.7–7)
NEUTROPHILS NFR BLD AUTO: 78.2 % (ref 42.7–76)
NEUTROPHILS NFR BLD AUTO: 82.7 % (ref 42.7–76)
NRBC BLD AUTO-RTO: 0 /100 WBC (ref 0–0.2)
NRBC BLD AUTO-RTO: 0 /100 WBC (ref 0–0.2)
NT-PROBNP SERPL-MCNC: 1250 PG/ML (ref 0–900)
PLATELET # BLD AUTO: 242 10*3/MM3 (ref 140–450)
PLATELET # BLD AUTO: 242 10*3/MM3 (ref 140–450)
PMV BLD AUTO: 10 FL (ref 6–12)
PMV BLD AUTO: 10.8 FL (ref 6–12)
POTASSIUM SERPL-SCNC: 3.4 MMOL/L (ref 3.5–5.2)
POTASSIUM SERPL-SCNC: 4.3 MMOL/L (ref 3.5–5.2)
PROT SERPL-MCNC: 7.4 G/DL (ref 6–8.5)
PROTHROMBIN TIME: 13.7 SECONDS (ref 12.3–15.1)
RBC # BLD AUTO: 5.23 10*6/MM3 (ref 3.77–5.28)
RBC # BLD AUTO: 5.35 10*6/MM3 (ref 3.77–5.28)
SARS-COV-2 RNA RESP QL NAA+PROBE: NOT DETECTED
SODIUM SERPL-SCNC: 135 MMOL/L (ref 136–145)
SODIUM SERPL-SCNC: 138 MMOL/L (ref 136–145)
TROPONIN T DELTA: 99 NG/L
TROPONIN T SERPL HS-MCNC: 186 NG/L
UFH PPP CHRO-ACNC: 0.1 IU/ML (ref 0.3–0.7)
UFH PPP CHRO-ACNC: 0.1 IU/ML (ref 0.3–0.7)
WBC NRBC COR # BLD AUTO: 13.71 10*3/MM3 (ref 3.4–10.8)
WBC NRBC COR # BLD AUTO: 14.91 10*3/MM3 (ref 3.4–10.8)
WHOLE BLOOD HOLD COAG: NORMAL
WHOLE BLOOD HOLD SPECIMEN: NORMAL

## 2024-03-26 PROCEDURE — 25510000001 IOPAMIDOL 61 % SOLUTION: Performed by: STUDENT IN AN ORGANIZED HEALTH CARE EDUCATION/TRAINING PROGRAM

## 2024-03-26 PROCEDURE — 93306 TTE W/DOPPLER COMPLETE: CPT

## 2024-03-26 PROCEDURE — 25010000002 MIDAZOLAM PER 1MG: Performed by: INTERNAL MEDICINE

## 2024-03-26 PROCEDURE — 85025 COMPLETE CBC W/AUTO DIFF WBC: CPT | Performed by: STUDENT IN AN ORGANIZED HEALTH CARE EDUCATION/TRAINING PROGRAM

## 2024-03-26 PROCEDURE — B2111ZZ FLUOROSCOPY OF MULTIPLE CORONARY ARTERIES USING LOW OSMOLAR CONTRAST: ICD-10-PCS | Performed by: INTERNAL MEDICINE

## 2024-03-26 PROCEDURE — 25010000002 DIPHENHYDRAMINE PER 50 MG: Performed by: INTERNAL MEDICINE

## 2024-03-26 PROCEDURE — 83036 HEMOGLOBIN GLYCOSYLATED A1C: CPT | Performed by: INTERNAL MEDICINE

## 2024-03-26 PROCEDURE — 63710000001 INSULIN ASPART PER 5 UNITS: Performed by: STUDENT IN AN ORGANIZED HEALTH CARE EDUCATION/TRAINING PROGRAM

## 2024-03-26 PROCEDURE — 25010000002 HEPARIN (PORCINE) PER 1000 UNITS: Performed by: INTERNAL MEDICINE

## 2024-03-26 PROCEDURE — 25010000002 FUROSEMIDE PER 20 MG: Performed by: STUDENT IN AN ORGANIZED HEALTH CARE EDUCATION/TRAINING PROGRAM

## 2024-03-26 PROCEDURE — 93454 CORONARY ARTERY ANGIO S&I: CPT | Performed by: INTERNAL MEDICINE

## 2024-03-26 PROCEDURE — 99235 HOSP IP/OBS SAME DATE MOD 70: CPT | Performed by: INTERNAL MEDICINE

## 2024-03-26 PROCEDURE — 94799 UNLISTED PULMONARY SVC/PX: CPT

## 2024-03-26 PROCEDURE — 25010000002 HYDROCORTISONE SOD SUC (PF) 100 MG RECONSTITUTED SOLUTION: Performed by: INTERNAL MEDICINE

## 2024-03-26 PROCEDURE — 82948 REAGENT STRIP/BLOOD GLUCOSE: CPT | Performed by: STUDENT IN AN ORGANIZED HEALTH CARE EDUCATION/TRAINING PROGRAM

## 2024-03-26 PROCEDURE — 85520 HEPARIN ASSAY: CPT | Performed by: STUDENT IN AN ORGANIZED HEALTH CARE EDUCATION/TRAINING PROGRAM

## 2024-03-26 PROCEDURE — 93306 TTE W/DOPPLER COMPLETE: CPT | Performed by: INTERNAL MEDICINE

## 2024-03-26 PROCEDURE — 71275 CT ANGIOGRAPHY CHEST: CPT

## 2024-03-26 PROCEDURE — 25010000002 FENTANYL CITRATE (PF) 50 MCG/ML SOLUTION: Performed by: INTERNAL MEDICINE

## 2024-03-26 PROCEDURE — C1769 GUIDE WIRE: HCPCS | Performed by: INTERNAL MEDICINE

## 2024-03-26 PROCEDURE — 94660 CPAP INITIATION&MGMT: CPT

## 2024-03-26 PROCEDURE — 25010000002 DIPHENHYDRAMINE PER 50 MG: Performed by: STUDENT IN AN ORGANIZED HEALTH CARE EDUCATION/TRAINING PROGRAM

## 2024-03-26 PROCEDURE — 63710000001 INSULIN DETEMIR PER 5 UNITS: Performed by: STUDENT IN AN ORGANIZED HEALTH CARE EDUCATION/TRAINING PROGRAM

## 2024-03-26 PROCEDURE — 84484 ASSAY OF TROPONIN QUANT: CPT | Performed by: STUDENT IN AN ORGANIZED HEALTH CARE EDUCATION/TRAINING PROGRAM

## 2024-03-26 PROCEDURE — 25010000002 LIDOCAINE 1 % SOLUTION: Performed by: INTERNAL MEDICINE

## 2024-03-26 PROCEDURE — 80048 BASIC METABOLIC PNL TOTAL CA: CPT | Performed by: STUDENT IN AN ORGANIZED HEALTH CARE EDUCATION/TRAINING PROGRAM

## 2024-03-26 PROCEDURE — 25010000002 NITROGLYCERIN 200 MCG/ML SOLUTION: Performed by: STUDENT IN AN ORGANIZED HEALTH CARE EDUCATION/TRAINING PROGRAM

## 2024-03-26 PROCEDURE — 25010000002 HEPARIN (PORCINE) PER 1000 UNITS: Performed by: STUDENT IN AN ORGANIZED HEALTH CARE EDUCATION/TRAINING PROGRAM

## 2024-03-26 PROCEDURE — 25010000002 ONDANSETRON PER 1 MG: Performed by: STUDENT IN AN ORGANIZED HEALTH CARE EDUCATION/TRAINING PROGRAM

## 2024-03-26 PROCEDURE — C1894 INTRO/SHEATH, NON-LASER: HCPCS | Performed by: INTERNAL MEDICINE

## 2024-03-26 PROCEDURE — 25510000001 IOPAMIDOL PER 1 ML: Performed by: INTERNAL MEDICINE

## 2024-03-26 PROCEDURE — 99222 1ST HOSP IP/OBS MODERATE 55: CPT | Performed by: INTERNAL MEDICINE

## 2024-03-26 PROCEDURE — 25010000002 POTASSIUM CHLORIDE 10 MEQ/100ML SOLUTION: Performed by: STUDENT IN AN ORGANIZED HEALTH CARE EDUCATION/TRAINING PROGRAM

## 2024-03-26 RX ORDER — VERAPAMIL HYDROCHLORIDE 2.5 MG/ML
INJECTION, SOLUTION INTRAVENOUS
Status: DISCONTINUED | OUTPATIENT
Start: 2024-03-26 | End: 2024-03-26 | Stop reason: HOSPADM

## 2024-03-26 RX ORDER — ONDANSETRON 2 MG/ML
4 INJECTION INTRAMUSCULAR; INTRAVENOUS ONCE
Status: COMPLETED | OUTPATIENT
Start: 2024-03-26 | End: 2024-03-26

## 2024-03-26 RX ORDER — TEMAZEPAM 15 MG/1
15 CAPSULE ORAL NIGHTLY PRN
Status: DISCONTINUED | OUTPATIENT
Start: 2024-03-26 | End: 2024-03-26 | Stop reason: HOSPADM

## 2024-03-26 RX ORDER — HEPARIN SODIUM 10000 [USP'U]/100ML
1000 INJECTION, SOLUTION INTRAVENOUS
Status: DISCONTINUED | OUTPATIENT
Start: 2024-03-26 | End: 2024-03-26

## 2024-03-26 RX ORDER — ATORVASTATIN CALCIUM 80 MG/1
80 TABLET, FILM COATED ORAL DAILY
Status: DISCONTINUED | OUTPATIENT
Start: 2024-03-26 | End: 2024-03-26 | Stop reason: HOSPADM

## 2024-03-26 RX ORDER — DIPHENHYDRAMINE HYDROCHLORIDE 50 MG/ML
50 INJECTION INTRAMUSCULAR; INTRAVENOUS
Status: DISCONTINUED | OUTPATIENT
Start: 2024-03-26 | End: 2024-03-26

## 2024-03-26 RX ORDER — NICOTINE 21 MG/24HR
1 PATCH, TRANSDERMAL 24 HOURS TRANSDERMAL
Status: DISCONTINUED | OUTPATIENT
Start: 2024-03-26 | End: 2024-03-26 | Stop reason: HOSPADM

## 2024-03-26 RX ORDER — FAMOTIDINE 10 MG/ML
20 INJECTION, SOLUTION INTRAVENOUS 2 TIMES DAILY
Status: DISCONTINUED | OUTPATIENT
Start: 2024-03-26 | End: 2024-03-26 | Stop reason: HOSPADM

## 2024-03-26 RX ORDER — IBUPROFEN 600 MG/1
1 TABLET ORAL
Status: DISCONTINUED | OUTPATIENT
Start: 2024-03-26 | End: 2024-03-26 | Stop reason: HOSPADM

## 2024-03-26 RX ORDER — NITROGLYCERIN 20 MG/100ML
5-200 INJECTION INTRAVENOUS
Status: DISCONTINUED | OUTPATIENT
Start: 2024-03-26 | End: 2024-03-26

## 2024-03-26 RX ORDER — DIPHENHYDRAMINE HCL 25 MG
50 TABLET ORAL
Status: DISCONTINUED | OUTPATIENT
Start: 2024-03-26 | End: 2024-03-26

## 2024-03-26 RX ORDER — DIPHENHYDRAMINE HCL 25 MG
50 CAPSULE ORAL
Status: DISCONTINUED | OUTPATIENT
Start: 2024-03-26 | End: 2024-03-26

## 2024-03-26 RX ORDER — HEPARIN SODIUM 1000 [USP'U]/ML
INJECTION, SOLUTION INTRAVENOUS; SUBCUTANEOUS
Status: DISCONTINUED | OUTPATIENT
Start: 2024-03-26 | End: 2024-03-26 | Stop reason: HOSPADM

## 2024-03-26 RX ORDER — SODIUM CHLORIDE 0.9 % (FLUSH) 0.9 %
10 SYRINGE (ML) INJECTION AS NEEDED
Status: DISCONTINUED | OUTPATIENT
Start: 2024-03-26 | End: 2024-03-26 | Stop reason: HOSPADM

## 2024-03-26 RX ORDER — HYDROCODONE BITARTRATE AND ACETAMINOPHEN 5; 325 MG/1; MG/1
1 TABLET ORAL EVERY 4 HOURS PRN
Status: DISCONTINUED | OUTPATIENT
Start: 2024-03-26 | End: 2024-03-26 | Stop reason: HOSPADM

## 2024-03-26 RX ORDER — NICOTINE POLACRILEX 4 MG
15 LOZENGE BUCCAL
Status: DISCONTINUED | OUTPATIENT
Start: 2024-03-26 | End: 2024-03-26 | Stop reason: HOSPADM

## 2024-03-26 RX ORDER — SODIUM CHLORIDE 9 MG/ML
100 INJECTION, SOLUTION INTRAVENOUS CONTINUOUS
Status: ACTIVE | OUTPATIENT
Start: 2024-03-26 | End: 2024-03-26

## 2024-03-26 RX ORDER — FENTANYL CITRATE 50 UG/ML
INJECTION, SOLUTION INTRAMUSCULAR; INTRAVENOUS
Status: DISCONTINUED | OUTPATIENT
Start: 2024-03-26 | End: 2024-03-26 | Stop reason: HOSPADM

## 2024-03-26 RX ORDER — DIPHENHYDRAMINE HYDROCHLORIDE 50 MG/ML
25 INJECTION INTRAMUSCULAR; INTRAVENOUS EVERY 6 HOURS PRN
Status: DISCONTINUED | OUTPATIENT
Start: 2024-03-26 | End: 2024-03-26 | Stop reason: HOSPADM

## 2024-03-26 RX ORDER — ISOSORBIDE MONONITRATE 30 MG/1
30 TABLET, EXTENDED RELEASE ORAL EVERY MORNING
Status: DISCONTINUED | OUTPATIENT
Start: 2024-03-26 | End: 2024-03-26 | Stop reason: HOSPADM

## 2024-03-26 RX ORDER — SODIUM CHLORIDE 0.9 % (FLUSH) 0.9 %
10 SYRINGE (ML) INJECTION EVERY 12 HOURS SCHEDULED
Status: DISCONTINUED | OUTPATIENT
Start: 2024-03-26 | End: 2024-03-26 | Stop reason: HOSPADM

## 2024-03-26 RX ORDER — FUROSEMIDE 10 MG/ML
40 INJECTION INTRAMUSCULAR; INTRAVENOUS ONCE
Status: COMPLETED | OUTPATIENT
Start: 2024-03-26 | End: 2024-03-26

## 2024-03-26 RX ORDER — ASPIRIN 81 MG/1
81 TABLET, CHEWABLE ORAL DAILY
Status: DISCONTINUED | OUTPATIENT
Start: 2024-03-26 | End: 2024-03-26 | Stop reason: HOSPADM

## 2024-03-26 RX ORDER — POTASSIUM CHLORIDE 7.45 MG/ML
10 INJECTION INTRAVENOUS
Status: DISCONTINUED | OUTPATIENT
Start: 2024-03-26 | End: 2024-03-26

## 2024-03-26 RX ORDER — HEPARIN SODIUM 10000 [USP'U]/100ML
14 INJECTION, SOLUTION INTRAVENOUS
Status: DISCONTINUED | OUTPATIENT
Start: 2024-03-26 | End: 2024-03-26

## 2024-03-26 RX ORDER — ALPRAZOLAM 0.25 MG/1
0.25 TABLET ORAL 3 TIMES DAILY PRN
Status: DISCONTINUED | OUTPATIENT
Start: 2024-03-26 | End: 2024-03-26 | Stop reason: HOSPADM

## 2024-03-26 RX ORDER — FAMOTIDINE 10 MG/ML
INJECTION, SOLUTION INTRAVENOUS
Status: DISCONTINUED | OUTPATIENT
Start: 2024-03-26 | End: 2024-03-26 | Stop reason: HOSPADM

## 2024-03-26 RX ORDER — ONDANSETRON 2 MG/ML
4 INJECTION INTRAMUSCULAR; INTRAVENOUS EVERY 6 HOURS PRN
Status: DISCONTINUED | OUTPATIENT
Start: 2024-03-26 | End: 2024-03-26 | Stop reason: HOSPADM

## 2024-03-26 RX ORDER — HEPARIN SODIUM 1000 [USP'U]/ML
4000 INJECTION, SOLUTION INTRAVENOUS; SUBCUTANEOUS ONCE
Status: DISCONTINUED | OUTPATIENT
Start: 2024-03-26 | End: 2024-03-26

## 2024-03-26 RX ORDER — SODIUM CHLORIDE 9 MG/ML
40 INJECTION, SOLUTION INTRAVENOUS AS NEEDED
Status: DISCONTINUED | OUTPATIENT
Start: 2024-03-26 | End: 2024-03-26 | Stop reason: HOSPADM

## 2024-03-26 RX ORDER — LIDOCAINE HYDROCHLORIDE 10 MG/ML
INJECTION, SOLUTION INFILTRATION; PERINEURAL
Status: DISCONTINUED | OUTPATIENT
Start: 2024-03-26 | End: 2024-03-26 | Stop reason: HOSPADM

## 2024-03-26 RX ORDER — DIPHENHYDRAMINE HYDROCHLORIDE 50 MG/ML
25 INJECTION INTRAMUSCULAR; INTRAVENOUS ONCE
Status: COMPLETED | OUTPATIENT
Start: 2024-03-26 | End: 2024-03-26

## 2024-03-26 RX ORDER — OXYCODONE HYDROCHLORIDE 5 MG/1
5 TABLET ORAL 2 TIMES DAILY
Status: DISCONTINUED | OUTPATIENT
Start: 2024-03-26 | End: 2024-03-26 | Stop reason: HOSPADM

## 2024-03-26 RX ORDER — HEPARIN SODIUM 1000 [USP'U]/ML
4000 INJECTION, SOLUTION INTRAVENOUS; SUBCUTANEOUS AS NEEDED
Status: DISCONTINUED | OUTPATIENT
Start: 2024-03-26 | End: 2024-03-26 | Stop reason: ALTCHOICE

## 2024-03-26 RX ORDER — NALOXONE HCL 0.4 MG/ML
0.4 VIAL (ML) INJECTION
Status: DISCONTINUED | OUTPATIENT
Start: 2024-03-26 | End: 2024-03-26 | Stop reason: HOSPADM

## 2024-03-26 RX ORDER — HEPARIN SODIUM 1000 [USP'U]/ML
2000 INJECTION, SOLUTION INTRAVENOUS; SUBCUTANEOUS AS NEEDED
Status: DISCONTINUED | OUTPATIENT
Start: 2024-03-26 | End: 2024-03-26 | Stop reason: ALTCHOICE

## 2024-03-26 RX ORDER — INSULIN ASPART 100 [IU]/ML
1-200 INJECTION, SOLUTION INTRAVENOUS; SUBCUTANEOUS AS NEEDED
Status: DISCONTINUED | OUTPATIENT
Start: 2024-03-26 | End: 2024-03-26 | Stop reason: HOSPADM

## 2024-03-26 RX ORDER — ACETAMINOPHEN 650 MG/1
650 SUPPOSITORY RECTAL EVERY 4 HOURS PRN
Status: DISCONTINUED | OUTPATIENT
Start: 2024-03-26 | End: 2024-03-26 | Stop reason: HOSPADM

## 2024-03-26 RX ORDER — ACETAMINOPHEN 160 MG/5ML
650 SOLUTION ORAL EVERY 4 HOURS PRN
Status: DISCONTINUED | OUTPATIENT
Start: 2024-03-26 | End: 2024-03-26 | Stop reason: HOSPADM

## 2024-03-26 RX ORDER — ACETAMINOPHEN 325 MG/1
650 TABLET ORAL EVERY 4 HOURS PRN
Status: DISCONTINUED | OUTPATIENT
Start: 2024-03-26 | End: 2024-03-26 | Stop reason: HOSPADM

## 2024-03-26 RX ORDER — DEXTROSE MONOHYDRATE 25 G/50ML
10-50 INJECTION, SOLUTION INTRAVENOUS
Status: DISCONTINUED | OUTPATIENT
Start: 2024-03-26 | End: 2024-03-26 | Stop reason: HOSPADM

## 2024-03-26 RX ORDER — INSULIN ASPART 100 [IU]/ML
1-200 INJECTION, SOLUTION INTRAVENOUS; SUBCUTANEOUS
Status: DISCONTINUED | OUTPATIENT
Start: 2024-03-26 | End: 2024-03-26 | Stop reason: HOSPADM

## 2024-03-26 RX ORDER — ENOXAPARIN SODIUM 100 MG/ML
40 INJECTION SUBCUTANEOUS DAILY
Status: DISCONTINUED | OUTPATIENT
Start: 2024-03-26 | End: 2024-03-26 | Stop reason: HOSPADM

## 2024-03-26 RX ORDER — ASPIRIN 325 MG
325 TABLET, DELAYED RELEASE (ENTERIC COATED) ORAL ONCE
Status: COMPLETED | OUTPATIENT
Start: 2024-03-26 | End: 2024-03-26

## 2024-03-26 RX ORDER — GABAPENTIN 300 MG/1
600 CAPSULE ORAL EVERY 8 HOURS SCHEDULED
Status: DISCONTINUED | OUTPATIENT
Start: 2024-03-26 | End: 2024-03-26 | Stop reason: HOSPADM

## 2024-03-26 RX ORDER — NITROGLYCERIN 0.4 MG/1
0.4 TABLET SUBLINGUAL
Status: DISCONTINUED | OUTPATIENT
Start: 2024-03-26 | End: 2024-03-26

## 2024-03-26 RX ORDER — CARVEDILOL 25 MG/1
25 TABLET ORAL 2 TIMES DAILY
Status: DISCONTINUED | OUTPATIENT
Start: 2024-03-26 | End: 2024-03-26 | Stop reason: HOSPADM

## 2024-03-26 RX ORDER — ONDANSETRON 4 MG/1
4 TABLET, ORALLY DISINTEGRATING ORAL EVERY 6 HOURS PRN
Status: DISCONTINUED | OUTPATIENT
Start: 2024-03-26 | End: 2024-03-26 | Stop reason: HOSPADM

## 2024-03-26 RX ORDER — AMLODIPINE BESYLATE 5 MG/1
10 TABLET ORAL DAILY
Status: DISCONTINUED | OUTPATIENT
Start: 2024-03-26 | End: 2024-03-26 | Stop reason: HOSPADM

## 2024-03-26 RX ORDER — MIDAZOLAM HYDROCHLORIDE 2 MG/2ML
INJECTION, SOLUTION INTRAMUSCULAR; INTRAVENOUS
Status: DISCONTINUED | OUTPATIENT
Start: 2024-03-26 | End: 2024-03-26 | Stop reason: HOSPADM

## 2024-03-26 RX ORDER — CLOPIDOGREL BISULFATE 75 MG/1
75 TABLET ORAL DAILY
Status: DISCONTINUED | OUTPATIENT
Start: 2024-03-26 | End: 2024-03-26 | Stop reason: HOSPADM

## 2024-03-26 RX ORDER — OXYCODONE HYDROCHLORIDE 5 MG/1
5 TABLET ORAL 2 TIMES DAILY
COMMUNITY

## 2024-03-26 RX ADMIN — ASPIRIN 325 MG: 325 TABLET, COATED ORAL at 04:13

## 2024-03-26 RX ADMIN — DIPHENHYDRAMINE HYDROCHLORIDE 25 MG: 50 INJECTION INTRAMUSCULAR; INTRAVENOUS at 00:46

## 2024-03-26 RX ADMIN — IOPAMIDOL 100 ML: 612 INJECTION, SOLUTION INTRAVENOUS at 01:41

## 2024-03-26 RX ADMIN — Medication 10 ML: at 09:41

## 2024-03-26 RX ADMIN — ISOSORBIDE MONONITRATE 30 MG: 30 TABLET, EXTENDED RELEASE ORAL at 09:40

## 2024-03-26 RX ADMIN — ONDANSETRON 4 MG: 2 INJECTION INTRAMUSCULAR; INTRAVENOUS at 01:03

## 2024-03-26 RX ADMIN — HEPARIN SODIUM 11.18 UNITS/KG/HR: 10000 INJECTION, SOLUTION INTRAVENOUS at 04:05

## 2024-03-26 RX ADMIN — FUROSEMIDE 40 MG: 10 INJECTION, SOLUTION INTRAMUSCULAR; INTRAVENOUS at 00:46

## 2024-03-26 RX ADMIN — AMLODIPINE BESYLATE 10 MG: 5 TABLET ORAL at 09:40

## 2024-03-26 RX ADMIN — ATORVASTATIN CALCIUM 80 MG: 80 TABLET, FILM COATED ORAL at 09:40

## 2024-03-26 RX ADMIN — NITROGLYCERIN 5 MCG/MIN: 20 INJECTION INTRAVENOUS at 04:39

## 2024-03-26 RX ADMIN — INSULIN DETEMIR 12 UNITS: 100 INJECTION, SOLUTION SUBCUTANEOUS at 09:40

## 2024-03-26 RX ADMIN — ASPIRIN 81 MG CHEWABLE TABLET 81 MG: 81 TABLET CHEWABLE at 09:40

## 2024-03-26 RX ADMIN — CARVEDILOL 25 MG: 25 TABLET, FILM COATED ORAL at 09:40

## 2024-03-26 RX ADMIN — POTASSIUM CHLORIDE 10 MEQ: 7.46 INJECTION, SOLUTION INTRAVENOUS at 05:31

## 2024-03-26 RX ADMIN — CLOPIDOGREL BISULFATE 75 MG: 75 TABLET ORAL at 09:40

## 2024-03-26 RX ADMIN — INSULIN ASPART 6 UNITS: 100 INJECTION, SOLUTION INTRAVENOUS; SUBCUTANEOUS at 04:39

## 2024-03-26 RX ADMIN — INSULIN ASPART 3 UNITS: 100 INJECTION, SOLUTION INTRAVENOUS; SUBCUTANEOUS at 09:40

## 2024-03-26 RX ADMIN — Medication 1 PATCH: at 09:40

## 2024-03-26 NOTE — NURSING NOTE
"Patient left AMA accompanied by 2 daughters and niece. Patient was counseled by MD and Nurse about risks of leaving. Patient stated \"It's a basic human right to smoke.\" She was planning on having her family take her straight to [facility] so she could smoke while on the way.     [facility] transfer called and let them know that the patient left AMA, and was headed their way by personal vehicle.    "

## 2024-03-26 NOTE — PAYOR COMM NOTE
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 upper abdomen is unremarkable. No acute fractures.     IMPRESSION: 1. No pulmonary emboli. 2. Constellation of findings suggesting mild CHF with borderline cardiomegaly, trace bilateral pleural effusions, and lower lobe interlobular septal thickening in the setting of gravity-dependent interstitial edema. 3. Nonspecific ground-glass veiling in both lungs may reflect pulmonary edema but can be followed to exclude pneumonitis. Authenticated and Electronically Signed by Yannick Cheng MD on 03/26/2024 02:25:26 AM     I ordered and independently reviewed the above noted radiographic studies.     See radiologist's dictation for official interpretation.        PROCEDURES    Critical Care    Performed by: Bakari Malagon MD  Authorized by: Kerley, Brian Joseph, DO    Critical care provider statement:     Critical care time (minutes):  40    Critical care time was exclusive of:  Separately billable procedures and treating other patients and teaching time    Critical care was necessary to treat or prevent imminent or life-threatening deterioration of the following conditions:  Respiratory failure and cardiac failure    Critical care was time spent personally by me on the  following activities:  Pulse oximetry, ordering and review of radiographic studies, ordering and review of laboratory studies, ordering and performing treatments and interventions, development of treatment plan with patient or surrogate, evaluation of patient's response to treatment, examination of patient, interpretation of cardiac output measurements, re-evaluation of patient's condition and obtaining history from patient or surrogate    I assumed direction of critical care for this patient from another provider in my specialty: no      Care discussed with: admitting provider        ECG 12 Lead ED Triage Standing Order; SOA   Final Result          MEDICATIONS GIVEN IN ER    Medications   sodium chloride 0.9 % flush 10 mL (has no administration in time range)   Pharmacy to Dose Heparin (has no administration in time range)   nitroglycerin (TRIDIL) 200 mcg/ml infusion (has no administration in time range)   heparin 44725 units/250 ml (100 units/ml) in D5W (has no administration in time range)   amLODIPine (NORVASC) tablet 10 mg (has no administration in time range)   aspirin EC tablet 325 mg (has no administration in time range)   aspirin chewable tablet 81 mg (has no administration in time range)   atorvastatin (LIPITOR) tablet 80 mg (has no administration in time range)   carvedilol (COREG) tablet 25 mg (has no administration in time range)   clopidogrel (PLAVIX) tablet 75 mg (has no administration in time range)   isosorbide mononitrate (IMDUR) 24 hr tablet 30 mg (has no administration in time range)   sodium chloride 0.9 % flush 10 mL (has no administration in time range)   sodium chloride 0.9 % flush 10 mL (has no administration in time range)   sodium chloride 0.9 % infusion 40 mL (has no administration in time range)   acetaminophen (TYLENOL) tablet 650 mg (has no administration in time range)     Or   acetaminophen (TYLENOL) 160 MG/5ML oral solution 650 mg (has no administration in time range)     Or    acetaminophen (TYLENOL) suppository 650 mg (has no administration in time range)   ondansetron (ZOFRAN) injection 4 mg (has no administration in time range)   nitroglycerin (NITROSTAT) SL tablet 0.4 mg (has no administration in time range)   Potassium Replacement - Follow Nurse / BPA Driven Protocol (has no administration in time range)   Magnesium Standard Dose Replacement - Follow Nurse / BPA Driven Protocol (has no administration in time range)   Phosphorus Replacement - Follow Nurse / BPA Driven Protocol (has no administration in time range)   Calcium Replacement - Follow Nurse / BPA Driven Protocol (has no administration in time range)   furosemide (LASIX) injection 40 mg (40 mg Intravenous Given 3/26/24 0046)   diphenhydrAMINE (BENADRYL) injection 25 mg (25 mg Intravenous Given 3/26/24 0046)   ondansetron (ZOFRAN) injection 4 mg (4 mg Intravenous Given 3/26/24 0103)   iopamidol (ISOVUE-300) 61 % injection 100 mL (100 mL Intravenous Given 3/26/24 0141)         MEDICAL DECISION MAKING, PROGRESS, and CONSULTS    All labs, if obtained, have been independently reviewed by me.  All radiology studies, if obtained, have been reviewed by me and the radiologist dictating the report.  All EKG's, if obtained, have been independently viewed and interpreted by me.      Discussion below represents my analysis of pertinent findings related to patient's condition, differential diagnosis, treatment plan and final disposition.    Hector Nugent is a 53 y.o. female who presents to the ED c/o difficulty breathing.  Blood pressure improved on our triage vitals compared to EMSs after she was given nitroglycerin by them in transport.  Still tachycardic and tachypneic on arrival.   Had already received 324 mg of oral aspirin along with 2 doses of sublingual nitroglycerin.  Differential includes was not limited to flash pulmonary edema, hypertensive emergency, myocardial infarction, pneumonia, pneumothorax, pleural effusion.   Patient immediately transitioned to BiPAP with assistance from respiratory therapy.  Chest x-ray and extensive labs ordered along with EKG.    Chest x-ray personally reviewed and interpreted showing evidence of bilateral lower lung opacifications consistent with pulmonary edema versus infection or pneumonia.  Patient empirically given 40 mg of IV Lasix.  Labs also do show evidence of leukocytosis of 17, due to patient's tachycardia and leukocytosis concern for possible sepsis so antibiotics to be added on however will hold aggressive fluid bolus pending further results due to concern for flash pulmonary edema.  Elevated proBNP along with elevated initial troponin.  CT pulmonary embolism study obtained and negative for PE but did show evidence of CHF with likely edema versus pneumonitis.  Symptoms improved while patient is on BiPAP in emergency department and patient is feeling like she can breathe better.  2-hour troponin obtained and again significantly elevated with positive delta change.  Started on heparin drip for possible NSTEMI.  Patient also started on nitro drip for CHF/hypertensive emergency.  Extensively discussed results with patient and her family at bedside.  Patient admitted by hospitalist for further treatment.  Patient and family agreeable with this plan.          HEART Score: 6                      Orders placed during this visit:  Orders Placed This Encounter   Procedures    COVID-19 and FLU A/B PCR, 1 HR TAT - Swab, Nasopharynx    XR Chest 1 View    CT Angiogram Chest Pulmonary Embolism    [place] Draw    Comprehensive Metabolic Panel    BNP    Single High Sensitivity Troponin T    CBC Auto Differential    High Sensitivity Troponin T 2Hr    Heparin Anti-Xa    Protime-INR    aPTT    CBC Auto Differential    Basic Metabolic Panel    CBC Auto Differential    NPO Diet NPO Type: Strict NPO    Undress & Gown    Vital Signs    Notify Provider Platelet Count Less Than 62106    Stop Infusion & Notify  Provider if Bleeding Occurs    Vital Signs    Up With Assistance    Intake & Output    Weigh Patient    Oral Care    Maintain IV Access    Telemetry - Place Orders & Notify Provider of Results When Patient Experiences Acute Chest Pain, Dysrhythmia or Respiratory Distress    May Be Off Telemetry for Tests    Continuous Pulse Oximetry    Notify Provider (With Default Parameters)    Code Status and Medical Interventions:    Inpatient Case Management  Consult    Inpatient Diabetes Educator Consult    Inpatient Cardiology Consult    Inpatient Case Management  Consult    Oxygen Therapy- Nasal Cannula; Titrate 1-6 LPM Per SpO2; 90 - 95%    NIPPV - Provider Settings    ECG 12 Lead ED Triage Standing Order; SOA    Insert Peripheral IV    Insert Peripheral IV    Inpatient Admission    CBC & Differential    Green Top (Gel)    Lavender Top    Gold Top - SST    Light Blue Top    CBC & Differential    CBC & Differential         ED Course:    Consultants:                  Shared Decision Making:  After my consideration of clinical presentation and any laboratory/radiology studies obtained, I discussed the findings with the patient/patient representative who is in agreement with the treatment plan and the final disposition.   Risks and benefits of discharge and/or observation/admission were discussed.      AS OF 03:59 EDT VITALS:    BP - 151/97  HR - 100  TEMP - 98.1 °F (36.7 °C) (Axillary)  O2 SATS - 92%                  DIAGNOSIS  Final diagnoses:   NSTEMI (non-ST elevated myocardial infarction)   Hypertensive emergency   Acute pulmonary edema   Acute on chronic congestive heart failure, unspecified heart failure type   Leukocytosis, unspecified type   Acute respiratory failure with hypoxia         DISPOSITION  Admit      Please note that portions of this document were completed with voice recognition software.        Bakari Malagon MD  03/26/24 0400      Electronically signed by Bakari Malagon  MD at 03/26/24 0400       Valerie Molina, RN at 03/25/24 2337          BS en route 455 has not taken diabetic meds today. Neg for ST elevation en route EMS. Smokes 2 PPD.     Electronically signed by Valerie Molina, RN at 03/25/24 2330       Vital Signs (last day)       Date/Time Temp Temp src Pulse Resp BP Patient Position SpO2    03/26/24 1101 -- -- -- -- 122/77 -- --    03/26/24 1100 -- -- 73 19 99/60 Lying 96    03/26/24 1000 -- -- 79 20 122/77 Lying 100    03/26/24 0900 -- -- 84 19 118/72 Lying 98    03/26/24 0800 -- -- 96 20 129/80 Lying 99    03/26/24 0720 98.2 (36.8) Temporal 85 -- -- -- 99    03/26/24 0700 -- -- 90 22 121/75 Lying 97    03/26/24 0600 -- -- 91 -- 147/97 -- 97    03/26/24 0545 -- -- 92 -- 95/64 -- 97    03/26/24 0535 -- -- 87 -- 110/67 -- 99    03/26/24 0515 -- -- 92 -- 96/72 -- 97    03/26/24 0500 -- -- 102 -- 129/80 -- 97    03/26/24 0445 -- -- 102 -- 121/86 -- 99    03/26/24 0430 -- -- 90 -- 147/96 -- 99    03/26/24 0415 -- -- 95 -- 141/93 -- 99    03/26/24 0400 -- -- 91 -- 140/91 -- 98    03/26/24 0349 98 (36.7) Oral 100 -- 151/97 -- 92    03/26/24 0246 98.1 (36.7) Axillary 100 -- -- -- 98    03/26/24 0245 -- -- -- 21 163/96 -- --    03/26/24 0215 -- -- 101 -- 136/88 -- 97    03/26/24 0200 -- -- -- 22 -- -- --    03/26/24 0115 -- -- 105 -- 158/93 -- 94    03/26/24 0046 -- -- 112 20 155/89 -- --    03/26/24 0031 -- -- 114 -- 148/87 -- 95    03/26/24 0016 -- -- 116 -- 154/92 -- 95    03/25/24 2359 -- -- 110 -- 147/89 Lying 100    03/25/24 2345 98.7 (37.1) Oral 115 40 163/102 Sitting 100          Current Facility-Administered Medications   Medication Dose Route Frequency Provider Last Rate Last Admin    [Transfer Hold] acetaminophen (TYLENOL) tablet 650 mg  650 mg Oral Q4H PRN Kerley, Brian Joseph, DO        Or    [Transfer Hold] acetaminophen (TYLENOL) 160 MG/5ML oral solution 650 mg  650 mg Oral Q4H PRN Kerley, Brian Joseph, DO        Or    [Transfer Hold] acetaminophen (TYLENOL)  suppository 650 mg  650 mg Rectal Q4H PRN Kerley, Brian Joseph, DO        amLODIPine (NORVASC) tablet 10 mg  10 mg Oral Daily Kerley, Brian Joseph, DO   10 mg at 03/26/24 0940    [Transfer Hold] aspirin chewable tablet 81 mg  81 mg Oral Daily Kerley, Brian Joseph, DO   81 mg at 03/26/24 0940    [Transfer Hold] atorvastatin (LIPITOR) tablet 80 mg  80 mg Oral Daily Kerley, Brian Joseph, DO   80 mg at 03/26/24 0940    [Transfer Hold] Calcium Replacement - Follow Nurse / BPA Driven Protocol   Does not apply PRN Kerley, Brian Joseph, DO        carvedilol (COREG) tablet 25 mg  25 mg Oral BID Kerley, Brian Joseph, DO   25 mg at 03/26/24 0940    [Transfer Hold] clopidogrel (PLAVIX) tablet 75 mg  75 mg Oral Daily Kerley, Brian Joseph, DO   75 mg at 03/26/24 0940    [Transfer Hold] dextrose (D50W) (25 g/50 mL) IV injection 10-50 mL  10-50 mL Intravenous Q15 Min PRN Kerley, Brian Joseph, DO        [Transfer Hold] dextrose (GLUTOSE) oral gel 15 g  15 g Oral Q15 Min PRN Kerley, Brian Joseph, DO        [Transfer Hold] Glucagon (GLUCAGEN) injection 1 mg  1 mg Intramuscular Q15 Min PRN Kerley, Brian Joseph, DO        [Transfer Hold] heparin (porcine) injection 4,000 Units  4,000 Units Intravenous Once Ravi Queen MD        heparin 47518 units/250 ml (100 units/ml) in D5W  14 Units/kg/hr Intravenous Titrated Ravi Queen MD   Stopped at 03/26/24 1141    [Transfer Hold] insulin aspart (novoLOG) injection 1-200 Units  1-200 Units Subcutaneous 4x Daily With Meals & Nightly Kerley, Brian Joseph, DO   3 Units at 03/26/24 0940    [Transfer Hold] insulin aspart (novoLOG) injection 1-200 Units  1-200 Units Subcutaneous PRN Kerley, Brian Joseph, DO   6 Units at 03/26/24 0439    [Transfer Hold] insulin detemir (LEVEMIR) injection 1-200 Units  1-200 Units Subcutaneous BID - Glucommander Kerley, Brian Joseph, DO   12 Units at 03/26/24 0940    [Transfer Hold] isosorbide mononitrate (IMDUR) 24 hr tablet 30 mg  30 mg Oral QAM Kerley, Brian  DO Sincere   30 mg at 03/26/24 0940    [Transfer Hold] Magnesium Standard Dose Replacement - Follow Nurse / BPA Driven Protocol   Does not apply PRN Kerley, Brian Joseph, DO        [Transfer Hold] nicotine (NICODERM CQ) 21 MG/24HR patch 1 patch  1 patch Transdermal Q24H Kerley, Brian Joseph, DO   1 patch at 03/26/24 0940    [Transfer Hold] nitroglycerin (NITROSTAT) SL tablet 0.4 mg  0.4 mg Sublingual Q5 Min PRN Kerley, Brian Joseph, DO        nitroglycerin (TRIDIL) 200 mcg/ml infusion  5-200 mcg/min Intravenous Titrated Kerley, Brian Joseph, DO   Stopped at 03/26/24 0940    [Transfer Hold] ondansetron (ZOFRAN) injection 4 mg  4 mg Intravenous Q6H PRN Kerley, Brian Joseph, DO        Pharmacy to Dose Heparin   Does not apply Continuous PRN Kerley, Brian Joseph, DO        [Transfer Hold] Phosphorus Replacement - Follow Nurse / BPA Driven Protocol   Does not apply PRN Kerley, Brian Joseph, DO        Potassium Replacement - Follow Nurse / BPA Driven Protocol   Does not apply PRN Kerley, Brian Joseph, DO        [Transfer Hold] sodium chloride 0.9 % flush 10 mL  10 mL Intravenous PRN Kerley, Brian Joseph, DO        [Transfer Hold] sodium chloride 0.9 % flush 10 mL  10 mL Intravenous Q12H Kerley, Brian Joseph, DO   10 mL at 03/26/24 0941    [Transfer Hold] sodium chloride 0.9 % flush 10 mL  10 mL Intravenous PRN Kerley, Brian Joseph, DO        [Transfer Hold] sodium chloride 0.9 % infusion 40 mL  40 mL Intravenous PRN Kerley, Brian Joseph, DO         Lab Results (last 24 hours)       Procedure Component Value Units Date/Time    Heparin Anti-Xa [[id number]]  (Abnormal) Collected: 03/26/24 1005    Specimen: Blood Updated: 03/26/24 1044     Heparin Anti-Xa (UFH) 0.10 IU/ml     CBC Auto Differential [[id number]]  (Abnormal) Collected: 03/26/24 0428    Specimen: Blood Updated: 03/26/24 0540     WBC 13.71 10*3/mm3      RBC 5.23 10*6/mm3      Hemoglobin 15.5 g/dL      Hematocrit 46.1 %      MCV 88.1 fL      MCH 29.6 pg      MCHC  33.6 g/dL      RDW 12.0 %      RDW-SD 38.6 fl      MPV 10.8 fL      Platelets 242 10*3/mm3      Neutrophil % 78.2 %      Lymphocyte % 13.1 %      Monocyte % 7.6 %      Eosinophil % 0.1 %      Basophil % 0.5 %      Immature Grans % 0.5 %      Neutrophils, Absolute 10.71 10*3/mm3      Lymphocytes, Absolute 1.80 10*3/mm3      Monocytes, Absolute 1.04 10*3/mm3      Eosinophils, Absolute 0.02 10*3/mm3      Basophils, Absolute 0.07 10*3/mm3      Immature Grans, Absolute 0.07 10*3/mm3      nRBC 0.0 /100 WBC     Basic Metabolic Panel [[id number]]  (Abnormal) Collected: 03/26/24 0428    Specimen: Blood Updated: 03/26/24 0540     Glucose 364 mg/dL      BUN 12 mg/dL      Creatinine 0.67 mg/dL      Sodium 138 mmol/L      Potassium 4.3 mmol/L      Comment: Slight hemolysis detected by analyzer. Result may be falsely elevated.        Chloride 98 mmol/L      CO2 24.1 mmol/L      Calcium 9.1 mg/dL      BUN/Creatinine Ratio 17.9     Anion Gap 15.9 mmol/L      eGFR 103.4 mL/min/1.73     Narrative:      GFR Normal >60  Chronic Kidney Disease <60  Kidney Failure <15      Heparin Anti-Xa [[id number]]  (Abnormal) Collected: 03/26/24 0303    Specimen: Blood from Arm, Right Updated: 03/26/24 0346     Heparin Anti-Xa (UFH) 0.10 IU/ml     CBC & Differential [[id number]]  (Abnormal) Collected: 03/26/24 0303    Specimen: Blood from Arm, Right Updated: 03/26/24 0309    Narrative:      The following orders were created for panel order CBC & Differential.  Procedure                               Abnormality         Status                     ---------                               -----------         ------                     CBC Auto Differential[[id number]]        Abnormal            Final result                 Please view results for these tests on the individual orders.    CBC Auto Differential [[id number]]  (Abnormal) Collected: 03/26/24 0303    Specimen: Blood from Arm, Right Updated: 03/26/24 0309     WBC 14.91 10*3/mm3      RBC 5.35 10*6/mm3       Hemoglobin 15.8 g/dL      Hematocrit 46.7 %      MCV 87.3 fL      MCH 29.5 pg      MCHC 33.8 g/dL      RDW 12.0 %      RDW-SD 38.4 fl      MPV 10.0 fL      Platelets 242 10*3/mm3      Neutrophil % 82.7 %      Lymphocyte % 10.2 %      Monocyte % 5.9 %      Eosinophil % 0.2 %      Basophil % 0.6 %      Immature Grans % 0.4 %      Neutrophils, Absolute 12.33 10*3/mm3      Lymphocytes, Absolute 1.52 10*3/mm3      Monocytes, Absolute 0.88 10*3/mm3      Eosinophils, Absolute 0.03 10*3/mm3      Basophils, Absolute 0.09 10*3/mm3      Immature Grans, Absolute 0.06 10*3/mm3      nRBC 0.0 /100 WBC     Protime-INR [[id number]]  (Normal) Collected: 03/25/24 2343    Specimen: Blood Updated: 03/26/24 0305     Protime 13.7 Seconds      INR 1.00    Narrative:      Suggested INR therapeutic range for stable oral anticoagulant therapy:    Low Intensity therapy:   1.5-2.0  Moderate Intensity therapy:   2.0-3.0  High Intensity therapy:   2.5-4.0    aPTT [[id number]]  (Abnormal) Collected: 03/25/24 2343    Specimen: Blood Updated: 03/26/24 0305     PTT 31.6 seconds     High Sensitivity Troponin T 2Hr [[id number]]  (Abnormal) Collected: 03/26/24 0200    Specimen: Blood Updated: 03/26/24 0237     HS Troponin T 285 ng/L      Troponin T Delta 99 ng/L     Narrative:      High Sensitive Troponin T Reference Range:  <14.0 ng/L- Negative Female for AMI  <22.0 ng/L- Negative Male for AMI  >=14 - Abnormal Female indicating possible myocardial injury.  >=22 - Abnormal Male indicating possible myocardial injury.   Clinicians would have to utilize clinical acumen, EKG, Troponin, and serial changes to determine if it is an Acute Myocardial Infarction or myocardial injury due to an underlying chronic condition.         [place] Draw [[id number]] Collected: 03/25/24 2343    Specimen: Blood Updated: 03/26/24 0045    Narrative:      The following orders were created for panel order [place] Draw.  Procedure                               Abnormality          Status                     ---------                               -----------         ------                     Green Top (Gel)[[id number]]                                  Final result               Lavender Top[[id number]]                                     Final result               Gold Top - SST[[id number]]                                   Final result               Light Blue Top[[id number]]                                   Final result                 Please view results for these tests on the individual orders.    Green Top (Gel) [[id number]] Collected: 03/25/24 2343    Specimen: Blood Updated: 03/26/24 0045     Extra Tube Hold for add-ons.     Comment: Auto resulted.       Lavender Top [[id number]] Collected: 03/25/24 2343    Specimen: Blood Updated: 03/26/24 0045     Extra Tube hold for add-on     Comment: Auto resulted       Gold Top - SST [[id number]] Collected: 03/25/24 2343    Specimen: Blood Updated: 03/26/24 0045     Extra Tube Hold for add-ons.     Comment: Auto resulted.       Light Blue Top [[id number]] Collected: 03/25/24 2343    Specimen: Blood Updated: 03/26/24 0045     Extra Tube Hold for add-ons.     Comment: Auto resulted       Single High Sensitivity Troponin T [[id number]]  (Abnormal) Collected: 03/25/24 2343    Specimen: Blood Updated: 03/26/24 0034     HS Troponin T 186 ng/L     Narrative:      High Sensitive Troponin T Reference Range:  <14.0 ng/L- Negative Female for AMI  <22.0 ng/L- Negative Male for AMI  >=14 - Abnormal Female indicating possible myocardial injury.  >=22 - Abnormal Male indicating possible myocardial injury.   Clinicians would have to utilize clinical acumen, EKG, Troponin, and serial changes to determine if it is an Acute Myocardial Infarction or myocardial injury due to an underlying chronic condition.         BNP [[id number]]  (Abnormal) Collected: 03/25/24 2343    Specimen: Blood Updated: 03/26/24 0034     proBNP 1,250.0 pg/mL     Narrative:      This assay is used as  an aid in the diagnosis of individuals suspected of having heart failure. It can be used as an aid in the diagnosis of acute decompensated heart failure (ADHF) in patients presenting with signs and symptoms of ADHF to the emergency department (ED). In addition, NT-proBNP of <300 pg/mL indicates ADHF is not likely.    Age Range Result Interpretation  NT-proBNP Concentration (pg/mL:      <50             Positive            >450                   Gray                 300-450                    Negative             <300    50-75           Positive            >900                  Gray                300-900                  Negative            <300      >75             Positive            >1800                  Gray                300-1800                  Negative            <300    Comprehensive Metabolic Panel [[id number]]  (Abnormal) Collected: 03/25/24 2343    Specimen: Blood Updated: 03/26/24 0034     Glucose 423 mg/dL      BUN 10 mg/dL      Creatinine 0.73 mg/dL      Sodium 135 mmol/L      Potassium 3.4 mmol/L      Chloride 97 mmol/L      CO2 24.0 mmol/L      Calcium 8.8 mg/dL      Total Protein 7.4 g/dL      Albumin 4.0 g/dL      ALT (SGPT) 44 U/L      AST (SGOT) 49 U/L      Alkaline Phosphatase 105 U/L      Total Bilirubin 0.5 mg/dL      Globulin 3.4 gm/dL      A/G Ratio 1.2 g/dL      BUN/Creatinine Ratio 13.7     Anion Gap 14.0 mmol/L      eGFR 97.3 mL/min/1.73     Narrative:      GFR Normal >60  Chronic Kidney Disease <60  Kidney Failure <15      COVID-19 and FLU A/B PCR, 1 HR TAT - Swab, Nasopharynx [[id number]]  (Normal) Collected: 03/25/24 2343    Specimen: Swab from Nasopharynx Updated: 03/26/24 0008     COVID19 Not Detected     Influenza A PCR Not Detected     Influenza B PCR Not Detected    Narrative:      Fact sheet for providers: https://www.fda.gov/media/301628/download    Fact sheet for patients: https://www.fda.gov/media/636384/download    Test performed by PCR.    CBC & Differential [[id number]]   (Abnormal) Collected: 03/25/24 2343    Specimen: Blood Updated: 03/25/24 2349    Narrative:      The following orders were created for panel order CBC & Differential.  Procedure                               Abnormality         Status                     ---------                               -----------         ------                     CBC Auto Differential[[id number]]        Abnormal            Final result                 Please view results for these tests on the individual orders.    CBC Auto Differential [[id number]]  (Abnormal) Collected: 03/25/24 2343    Specimen: Blood Updated: 03/25/24 2349     WBC 17.32 10*3/mm3      RBC 5.17 10*6/mm3      Hemoglobin 15.5 g/dL      Hematocrit 45.3 %      MCV 87.6 fL      MCH 30.0 pg      MCHC 34.2 g/dL      RDW 11.9 %      RDW-SD 38.4 fl      MPV 10.3 fL      Platelets 309 10*3/mm3      Neutrophil % 69.2 %      Lymphocyte % 22.3 %      Monocyte % 6.1 %      Eosinophil % 1.3 %      Basophil % 0.6 %      Immature Grans % 0.5 %      Neutrophils, Absolute 11.98 10*3/mm3      Lymphocytes, Absolute 3.87 10*3/mm3      Monocytes, Absolute 1.06 10*3/mm3      Eosinophils, Absolute 0.23 10*3/mm3      Basophils, Absolute 0.10 10*3/mm3      Immature Grans, Absolute 0.08 10*3/mm3      nRBC 0.0 /100 WBC           Imaging Results (Last 24 Hours)       Procedure Component Value Units Date/Time    XR Chest 1 View [[id number]] Collected: 03/26/24 0916     Updated: 03/26/24 1010    Narrative: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

## 2024-03-26 NOTE — CONSULTS
"Diabetes Education  Assessment/Teaching    Patient Name:  Hector Nugent  YOB: 1970  MRN: [id number]  Admit Date:  3/25/2024      Assessment Date:  3/26/2024  Flowsheet Row Most Recent Value   General Information     Height 165.1 cm (65\")   Height Method Stated   Weight 90.2 kg (198 lb 13.7 oz)   Weight Method Bed scale   Pregnancy Assessment    Diabetes History    What type of diabetes do you have? Type 2   Length of Diabetes Diagnosis 10 + years  [diagnosed in 2012]   Current DM knowledge poor   Do you test your blood sugar at home? yes   Frequency of checks maybe 3 times a week   Meter type unknown   Who performs the test? self   Typical readings not addressed,pt. taken to cath lab   Have you had high blood sugar? (>140mg/dl) yes   How often do you have high blood sugar? frequently  [423 on admit]   Do you have any diabetes complications? circulation problems, heart disease   Education Preferences    Nutrition Information    Are you currently following a special meal plan? never  [pt. states that she eats a lot of fast food.  states recently eatin g a bit better like broccoli and cheese]   Do you eat mostly at home or out of the house? out of the house   Assessment Topics    Healthy Eating - Assessment Needs education  [pt. states that she eats little mj snack cakes at night and is drinking 4-5 Regular Mountain Dew Code Red, grapecranberry juice and green tea with sugar]   Being Active - Assessment Other (comment)  [pt. has SOA with activity and cannot exercise]   Taking Medication - Assessment Needs education  [pt. hasstopped her lantus in August 2023.  Pt. also stopped taking Ozempic.  pt. is not sure of her oral meds. knows she takes her Glipizide unknown dose once daily, invokana at night Januvia at night. Metformin once daily at night.]   Problem Solving - Assessment N/A-unable to assess   Reducing Risk - Assessment N/A-unable to assess   Healthy Coping - Assessment N/A-unable to assess "   Monitoring - Assessment Needs education  [pt. is only checking BG maybe 3 times a week]   DM Goals             Flowsheet Row Most Recent Value   DM Education Needs    Meter Has own   Frequency of Testing Daily   Blood Glucose Target 150   Blood Glucose Target Range  fasting and 2 hours post meal of less than 180   Medication Oral, Insulin   Problem Solving Hyperglycemia  [pt. refuses to take insulin or any injectables.  Refuses to give up sugary beverages]   Healthy Eating Basic meal plan provided   Physical Activity None   Physical Activity Frequency Never   Healthy Coping Other (comment)  [pt. although educated on diabetes,does not appear ready to make any changes for Glucose reduction]   Discharge Plan --  [pending]   Motivation Not interested   Teaching Method Explanation, Discussion, Handouts   Patient Response Needs reinforcement              Other Comments:  Pt. Is a 53 y.o female with history of Type 2 DM diagnosis in 2012. Pt. Has a past history of MI with PCI.  Pt. 's A1c is 10.1%.  pt. States that is good for her.  Pt. Has stopped her lantus and her ozempic and has only been taking Invokana, Januvia, metformin and Glipizide.  Pt. Does not know any of her doses and states that she will not take medications more than once a day and that is usually in the evening.  Discussed the possibility of going back home on insulin related to her A1c being above 10%. Pt. Refuses stating that she does not want to stick herself.  Discussed pt. Has activity intolerance.  Discussed that she is eating primarily fast food and is drinking 4-5 regular Mountain Dew Code Reds, grape-cranberry fruit juice and sweetened [employer] tea and sweetened green tea.  Pt. Eats Little Marisol cakes at night.  Pt. States that there are no unsweetened beverages that she will drink.  Discussed that this would be a great way to make huge improvements in her BG.  Pt. Declined.  Pt. Continues to smoke discussed smoking cessation. Discussed  "how her con tinued high BG and her smoking are continuing to place her at high risk for chronic complications.  Left pt. Written material on \"Diabetes Basics\", diet and BG logs.  Pt. Was taken down for heart cath.        Electronically signed by:  Blanka oMreno RN  03/26/24 11:53 EDT  "

## 2024-03-26 NOTE — PROGRESS NOTES
[facility]    Name:  Hector Nugent   Age:  53 y.o.  Sex:  female  :  1970  MRN:  [id number]   Visit Number:  [id number]  Admission Date:  3/25/2024  Date Of Service:  24  Primary Care Physician:  Provider, No Known     LOS: 0 days :    Chief Complaint:      Follow-up of chest pain.    Subjective:    Ms. Nugent was seen and examined this morning.  She is currently sitting up in the bed and is comfortable at rest.  Her chest pain has improved.  She still has minimal shortness of breath.  She is currently on nitroglycerin and heparin drip.  She is currently awaiting cardiac catheterization by Dr. Mazariegos.  She does have prior history of cardiac stents and intermittent angina.  Previous physician documentation, laboratory and imaging data have been reviewed.    Hospital Course:    Hector Nugent is a 53-year-old woman with past medical history of coronary artery disease with history of MI and PCI, hypertension, hyperlipidemia, type 2 diabetes, heart failure preserved ejection fraction, tobacco use disorder, JASON noncompliant with CPAP.  Presented to [facility] ED on 3/25/2024 with concern for shortness of air and chest pain, progressively worse over the past couple of hours prior to EMS.  She was placed on CPAP by EMS.  Systolic blood pressure was over 210, and she was given supplemental nitroglycerin.       ED summary: Afebrile, tachycardic, tachypneic which improved, hypertensive which improved, vital signs stable on CPAP.  EKG sinus tachycardia rate 117, nonspecific ST-T wave changes.  High-sensitivity troponin 186, 295, ACS suspected.  proBNP over 1200.  Blood glucose 423.  Leukocytosis 17.  COVID-negative.  CT angio chest no PE, but did show findings suggestive of CHF with cardiomegaly.  She was provided Benadryl due to history of allergic reaction with CT contrast, 40 mg IV Lasix, Zofran, nitro drip, heparin IV.    Patient was admitted to the medical ICU and  was continued on heparin and nitroglycerin drips.  She was seen by Dr. Mazariegos from cardiology who took her down for cardiac catheterization on 3/26/2024 and the patient was noted to have severe multivessel disease.  Dr. Mazariegos discussed the patient's condition with Dr. Senior at [facility] and the patient was accepted to  for surgical coronary revascularization.    Review of Systems:     All systems were reviewed and negative except as mentioned in subjective, assessment and plan.    Vital Signs:    Temp:  [97.7 °F (36.5 °C)-98.7 °F (37.1 °C)] 97.7 °F (36.5 °C)  Heart Rate:  [] 67  Resp:  [19-40] 19  BP: ()/() 101/59    Intake and output:    I/O last 3 completed shifts:  In: -   Out: 1425 [Urine:1425]  I/O this shift:  In: -   Out: 750 [Urine:750]    Physical Examination:    General Appearance:  Alert and cooperative.   Head:  Atraumatic and normocephalic.   Eyes: Conjunctivae and sclerae normal, no icterus. No pallor.   Throat: No oral lesions, no thrush, oral mucosa moist.   Neck: Supple, trachea midline, no thyromegaly.   Lungs:   Breath sounds heard bilaterally equally.  No wheezing or crackles. No Pleural rub or bronchial breathing.   Heart:  Normal S1 and S2, no murmur, no gallop, no rub. No JVD.   Abdomen:   Normal bowel sounds, no masses, no organomegaly. Soft, nontender, nondistended, no rebound tenderness.   Extremities: Supple, no edema, no cyanosis, no clubbing.   Skin: No bleeding or rash.   Neurologic: Alert and oriented x 3. No facial asymmetry. Moves all four limbs. No tremors.      Laboratory results:    Results from last 7 days   Lab Units 03/26/24  0428 03/25/24  2343   SODIUM mmol/L 138 135*   POTASSIUM mmol/L 4.3 3.4*   CHLORIDE mmol/L 98 97*   CO2 mmol/L 24.1 24.0   BUN mg/dL 12 10   CREATININE mg/dL 0.67 0.73   CALCIUM mg/dL 9.1 8.8   BILIRUBIN mg/dL  --  0.5   ALK PHOS U/L  --  105   ALT (SGPT) U/L  --  44*   AST (SGOT) U/L  --  49*   GLUCOSE mg/dL 364* 423*      Results from last 7 days   Lab Units 03/26/24  0428 03/26/24  0303 03/25/24  2343   WBC 10*3/mm3 13.71* 14.91* 17.32*   HEMOGLOBIN g/dL 15.5 15.8 15.5   HEMATOCRIT % 46.1 46.7* 45.3   PLATELETS 10*3/mm3 242 242 309     Results from last 7 days   Lab Units 03/25/24  2343   INR  1.00     Results from last 7 days   Lab Units 03/26/24  0200 03/25/24  2343   HSTROP T ng/L 285* 186*     I have reviewed the patient's laboratory results.    Radiology results:    Cardiac Catheterization/Vascular Study    Result Date: 3/26/2024  Severe three-vessel coronary artery disease     Adult Transthoracic Echo Complete W/ Cont if Necessary Per Protocol    Result Date: 3/26/2024    Left ventricular systolic function is moderately decreased. Estimated left ventricular EF = 38%   The left ventricular cavity is mild to moderately dilated.   Left ventricular diastolic function is consistent with (grade II w/high LAP) pseudonormalization.     XR Chest 1 View    Result Date: 3/26/2024  PROCEDURE: XR CHEST 1 VW-    HISTORY: SOA Triage Protocol  COMPARISON: April 2021.  FINDINGS: The heart is at the upper limits of normal in size. The mediastinum is unremarkable. There is new bibasilar atelectasis or infiltrate. There is new bronchial wall thickening. There is no pneumothorax. There are no acute osseous abnormalities.      Impression: New bibasilar airspace disease is suggestive of pneumonia. Continued follow-up is recommended.  New bronchial wall thickening.        Images were reviewed, interpreted, and dictated by Dr. Katrina Burrell MD Transcribed by Sol Fang PA-C.  This report was signed and finalized on 3/26/2024 10:08 AM by Katrina Burrell MD.      CT Angiogram Chest Pulmonary Embolism    Result Date: 3/26/2024  FINAL REPORT TECHNIQUE: null CLINICAL HISTORY: Hypoxia, tachycardia COMPARISON: null FINDINGS: CT angiography chest with contrast. 3D Postprocessing. Comparison: None Findings: The pulmonary arteries are well opacified.  Scattered plaque is seen in the thoracic aorta without aneurysm or dissection. The heart is borderline enlarged. There is no pericardial effusion. There are no enlarged mediastinal or hilar lymph nodes. The thyroid is unremarkable. Interlobular septal thickening in the lower lungs. Nonspecific hazy ground-glass veiling in both lungs. Trace bilateral pleural effusions with dependent subsegmental atelectasis in the right middle lung and lung bases. There are no worrisome pulmonary masses or nodules. Visualized upper abdomen is unremarkable. No acute fractures.     Impression: IMPRESSION: 1. No pulmonary emboli. 2. Constellation of findings suggesting mild CHF with borderline cardiomegaly, trace bilateral pleural effusions, and lower lobe interlobular septal thickening in the setting of gravity-dependent interstitial edema. 3. Nonspecific ground-glass veiling in both lungs may reflect pulmonary edema but can be followed to exclude pneumonitis. Authenticated and Electronically Signed by Yannick Cheng MD on 03/26/2024 02:25:26 AM   I have reviewed the patient's radiology reports.    Medication Review:     I have reviewed the patient's active and prn medications.     Problem List:      NSTEMI (non-ST elevated myocardial infarction)    Assessment:    Acute non-ST elevation myocardial infarction, POA.  Acute on chronic systolic and diastolic heart failure, POA.  Acute hypoxic respiratory failure secondary to #2, POA.  Coronary artery disease status post previous PCI and stents.  Hypertensive emergency, POA.  Chronic tobacco dependence.  Obstructive sleep apnea-noncompliant with CPAP.  Diabetes mellitus type 2 with hyperglycemia, POA.    Plan:    Non-STEMI/CAD.  - Continue aspirin, Plavix, carvedilol, Imdur, atorvastatin.  - Patient was seen by Dr. Mazariegos and I have discussed the patient's treatment plan with him.  - Patient is currently awaiting transfer to [facility] for CABG.  - Due to allergy to IV contrast, she  was placed on short course of prednisone taper.    Acute on chronic heart failure.  - 2D echocardiogram does show left ventricular ejection fraction of 38% with grade 2 diastolic dysfunction.  - She has improved with regards to her pulmonary edema by use of Lasix and nitroglycerin.  - I will start her on Entresto.  - Continue Lasix 20 mg daily for 2 more days.    Essential hypertension.  - Continue amlodipine, carvedilol, Imdur.    Diabetes mellitus type 2.  - Continue subcutaneous insulin protocol as per Glucomander.  - Will obtain hemoglobin A1c levels.    Discussed with nursing staff at the bedside.    DVT Prophylaxis: Enoxaparin  Code Status: Full  Diet: Cardiac diabetic  Discharge Plan: Awaiting transfer to  for CABG.    Ravi Queen MD  03/26/24  13:10 EDT    Dictated utilizing Dragon dictation.
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
## 2024-03-26 NOTE — PHARMACY RECOMMENDATION
"Pharmacy Consult - Enoxaparin Dosing  Hector Nugent is a 53 y.o. female who has been consulted to dose enoxaparin for VTE prophylaxis.     Allergies  Codeine, Contrast dye (echo or unknown ct/mr), Glipizide, Hydroxyzine, Sulfa antibiotics, and Topical sulfur    Relevant clinical data and objective history reviewed:   [Ht: 165.1 cm (65\"); Wt: 90.2 kg (198 lb 13.7 oz)]  Body mass index is 33.09 kg/m².  Estimated Creatinine Clearance: 105.3 mL/min (by C-G formula based on SCr of 0.67 mg/dL).  Results from last 7 days   Lab Units 03/26/24  0428 03/26/24  0303 03/25/24  2343   HEMOGLOBIN g/dL 15.5 15.8 15.5   HEMATOCRIT % 46.1 46.7* 45.3   PLATELETS 10*3/mm3 242 242 309   CREATININE mg/dL 0.67  --  0.73       Asessment/Plan  Initiate enoxaparin 40 mg subq q 24 hrs.  Pharmacy will monitor Ms. Nugent's renal function and clinical status and adjust the enoxaparin dose and/or frequency as needed.    Thank you for the opportunity to consult on this patient.    Yves Fletcher RPH, Pharm.D.  03/26/24  13:49 EDT    "

## 2024-03-26 NOTE — DISCHARGE SUMMARY
[facility]   DISCHARGE SUMMARY      Name:  Hector Nugent   Age:  53 y.o.  Sex:  female  :  1970  MRN:  [id number]   Visit Number:  [id number]    Admission Date:  3/25/2024  Date of Discharge:  3/26/2024  Primary Care Physician:  Provider, No Known    Important issues to note:    Patient signed out AGAINST MEDICAL ADVICE.    Discharge Diagnoses:     Acute type 2 N-STEMI due to demand ischemia from chronic CAD, POA  Acute on chronic systolic and diastolic heart failure, POA.  Acute hypoxic respiratory failure secondary to #2, POA.  Coronary artery disease status post previous PCI and stents.  Hypertensive emergency, POA.  Chronic tobacco dependence.  Obstructive sleep apnea-noncompliant with CPAP.  Diabetes mellitus type 2 with hyperglycemia, POA.    Problem List:     Active Hospital Problems    Diagnosis  POA    **NSTEMI (non-ST elevated myocardial infarction) [I21.4]  Yes      Resolved Hospital Problems   No resolved problems to display.     Presenting Problem:    Chief Complaint   Patient presents with    Shortness of Breath      Consults:     Consulting Physician(s)         Provider   Role Specialty     Breeding, Mark CAMARGO MD      Consulting Physician Cardiology          Procedures Performed:    Diagnostic coronary angiography on 3/26/2024.    History of presenting illness/Hospital Course:    Hector Nugent is a 53-year-old woman with past medical history of coronary artery disease with history of MI and PCI, hypertension, hyperlipidemia, type 2 diabetes, heart failure preserved ejection fraction, tobacco use disorder, JASON noncompliant with CPAP.  Presented to [facility] ED on 3/25/2024 with concern for shortness of air and chest pain, progressively worse over the past couple of hours prior to EMS.  She was placed on CPAP by EMS.  Systolic blood pressure was over 210, and she was given supplemental nitroglycerin.       ED summary: Afebrile, tachycardic, tachypneic which improved,  hypertensive which improved, vital signs stable on CPAP.  EKG sinus tachycardia rate 117, nonspecific ST-T wave changes.  High-sensitivity troponin 186, 295, ACS suspected.  proBNP over 1200.  Blood glucose 423.  Leukocytosis 17.  COVID-negative.  CT angio chest no PE, but did show findings suggestive of CHF with cardiomegaly.  She was provided Benadryl due to history of allergic reaction with CT contrast, 40 mg IV Lasix, Zofran, nitro drip, heparin IV.     Patient was admitted to the medical ICU and was continued on heparin and nitroglycerin drips.  She was seen by Dr. Mazariegos from cardiology who took her down for cardiac catheterization on 3/26/2024 and the patient was noted to have severe multivessel disease.  Dr. Mazariegos discussed the patient's condition with Dr. Senior at [facility] and the patient was accepted to  for surgical coronary revascularization.    Unfortunately, patient became agitated that she wanted to smoke.  She was provided nicotine patch and Neurontin for her neuropathy but still was adamant to sign out AGAINST MEDICAL ADVICE.  She was counseled at the bedside by me that this can cause other myocardial infarction, sudden cardiac death.  She understands the risks and wants to sign out AGAINST MEDICAL ADVICE.  I discussed this in front of her 2 daughters who are at the bedside.    Vital Signs:    Temp:  [97.7 °F (36.5 °C)-98.7 °F (37.1 °C)] 97.7 °F (36.5 °C)  Heart Rate:  [] 71  Resp:  [9-40] 9  BP: ()/() 96/63    Physical Exam:    General Appearance:  Alert and cooperative.    Head:  Atraumatic and normocephalic.   Eyes: Conjunctivae and sclerae normal, no icterus. No pallor.   Ears:  Ears with no abnormalities noted.   Throat: No oral lesions, no thrush, oral mucosa moist.   Neck: Supple, trachea midline, no thyromegaly.   Back:   No kyphoscoliosis present. No tenderness to palpation.   Lungs:   Breath sounds heard bilaterally equally.  No crackles or wheezing. No  Pleural rub or bronchial breathing.   Heart:  Normal S1 and S2, no murmur, no gallop, no rub. No JVD.   Abdomen:   Normal bowel sounds, no masses, no organomegaly. Soft, nontender, nondistended, no rebound tenderness.   Extremities: Supple, no edema, no cyanosis, no clubbing.   Pulses: Pulses palpable bilaterally.   Skin: No bleeding or rash.   Neurologic: Alert and oriented x 3. No facial asymmetry. Moves all four limbs. No tremors.     Pertinent Lab Results:     Results from last 7 days   Lab Units 03/26/24  0428 03/25/24  2343   SODIUM mmol/L 138 135*   POTASSIUM mmol/L 4.3 3.4*   CHLORIDE mmol/L 98 97*   CO2 mmol/L 24.1 24.0   BUN mg/dL 12 10   CREATININE mg/dL 0.67 0.73   CALCIUM mg/dL 9.1 8.8   BILIRUBIN mg/dL  --  0.5   ALK PHOS U/L  --  105   ALT (SGPT) U/L  --  44*   AST (SGOT) U/L  --  49*   GLUCOSE mg/dL 364* 423*     Results from last 7 days   Lab Units 03/26/24  0428 03/26/24  0303 03/25/24  2343   WBC 10*3/mm3 13.71* 14.91* 17.32*   HEMOGLOBIN g/dL 15.5 15.8 15.5   HEMATOCRIT % 46.1 46.7* 45.3   PLATELETS 10*3/mm3 242 242 309     Results from last 7 days   Lab Units 03/25/24  2343   INR  1.00     Results from last 7 days   Lab Units 03/26/24  0200 03/25/24  2343   HSTROP T ng/L 285* 186*     Results from last 7 days   Lab Units 03/25/24  2343   PROBNP pg/mL 1,250.0*     Pertinent Radiology Results:    Imaging Results (All)       Procedure Component Value Units Date/Time    XR Chest 1 View [[id number]] Collected: 03/26/24 0916     Updated: 03/26/24 1010    Narrative:      PROCEDURE: XR CHEST 1 VW-        HISTORY: SOA Triage Protocol     COMPARISON: April 2021.     FINDINGS: The heart is at the upper limits of normal in size. The  mediastinum is unremarkable. There is new bibasilar atelectasis or  infiltrate. There is new bronchial wall thickening. There is no  pneumothorax. There are no acute osseous abnormalities.       Impression:      New bibasilar airspace disease is suggestive of  pneumonia.  Continued follow-up is recommended.     New bronchial wall thickening.        Images were reviewed, interpreted, and dictated by Dr. Katrina Burrell MD  Transcribed by Sol Fang PA-C.     This report was signed and finalized on 3/26/2024 10:08 AM by Katrina Burrell MD.       CT Angiogram Chest Pulmonary Embolism [[id number]] Collected: 03/26/24 0225     Updated: 03/26/24 0226    Narrative:      FINAL REPORT    TECHNIQUE:  null    CLINICAL HISTORY:  Hypoxia, tachycardia    COMPARISON:  null    FINDINGS:  CT angiography chest with contrast. 3D Postprocessing.    Comparison: None    Findings:    The pulmonary arteries are well opacified.    Scattered plaque is seen in the thoracic aorta without aneurysm or dissection.    The heart is borderline enlarged. There is no pericardial effusion.    There are no enlarged mediastinal or hilar lymph nodes.    The thyroid is unremarkable.    Interlobular septal thickening in the lower lungs.    Nonspecific hazy ground-glass veiling in both lungs.    Trace bilateral pleural effusions with dependent subsegmental atelectasis in the right middle lung and lung bases.    There are no worrisome pulmonary masses or nodules.    Visualized upper abdomen is unremarkable.    No acute fractures.      Impression:      IMPRESSION:    1. No pulmonary emboli.    2. Constellation of findings suggesting mild CHF with borderline cardiomegaly, trace bilateral pleural effusions, and lower lobe interlobular septal thickening in the setting of gravity-dependent interstitial edema.    3. Nonspecific ground-glass veiling in both lungs may reflect pulmonary edema but can be followed to exclude pneumonitis.    Authenticated and Electronically Signed by Yannick Cheng MD on  03/26/2024 02:25:26 AM     Echo:    Results for orders placed during the hospital encounter of 03/25/24    Adult Transthoracic Echo Complete W/ Cont if Necessary Per Protocol    Interpretation Summary    Left ventricular  systolic function is moderately decreased. Estimated left ventricular EF = 38%    The left ventricular cavity is mild to moderately dilated.    Left ventricular diastolic function is consistent with (grade II w/high LAP) pseudonormalization.    Condition on Discharge:      Patient signed out AGAINST MEDICAL ADVICE.    Code status during the hospital stay:    Code Status and Medical Interventions:   Ordered at: 03/26/24 0355     Code Status (Patient has no pulse and is not breathing):    CPR (Attempt to Resuscitate)     Medical Interventions (Patient has pulse or is breathing):    Full Support     Discharge Disposition:    Left Against Medical Advice    Discharge Medications:       Discharge Medications        Continue These Medications        Instructions Start Date   Allergy Relief 10 MG tablet  Generic drug: loratadine   10 mg, Daily      amLODIPine 10 MG tablet  Commonly known as: NORVASC   10 mg, Oral, Daily      aspirin 81 MG chewable tablet   81 mg, Oral, Daily, DNS PER DR. ROWLEY      atorvastatin 80 MG tablet  Commonly known as: LIPITOR   80 mg, Oral, Daily      B-D ULTRAFINE III SHORT PEN 31G X 8 MM misc  Generic drug: Insulin Pen Needle   2 Times Daily      carvedilol 25 MG tablet  Commonly known as: COREG   25 mg, Oral, 2 Times Daily      clopidogrel 75 MG tablet  Commonly known as: PLAVIX   75 mg, Oral, Daily      cyclobenzaprine 10 MG tablet  Commonly known as: FLEXERIL   1 tablet, 3 Times Daily      cyclobenzaprine 10 MG tablet  Commonly known as: FLEXERIL   10 mg, Oral, 3 Times Daily PRN      EPINEPHrine 0.3 MG/0.3ML solution auto-injector injection  Commonly known as: EPIPEN       estradiol 0.5 MG tablet  Commonly known as: ESTRACE   0.5 mg, Oral, Daily      gabapentin 600 MG tablet  Commonly known as: NEURONTIN   Take 1 tablet by mouth 3 (Three) Times a Day.      gemfibrozil 600 MG tablet  Commonly known as: LOPID   TAKE ONE TABLET BY MOUTH TWICE DAILY 30 minutes BEFORE morning AND evening meal       Invokana 300 MG tablet tablet  Generic drug: Canagliflozin   TAKE ONE TABLET BY MOUTH EVERY DAY BEFORE first meal of DAY      isosorbide mononitrate 30 MG 24 hr tablet  Commonly known as: IMDUR   30 mg, Oral, Every Morning      Januvia 100 MG tablet  Generic drug: SITagliptin   1 tablet, Oral, Daily      Lantus SoloStar 100 UNIT/ML injection pen  Generic drug: Insulin Glargine   inject 30 units by subcutaneous route DAILY      lidocaine 5 %  Commonly known as: LIDODERM   1 patch, Transdermal, Every 24 Hours, Remove & Discard patch within 12 hours or as directed by MD      losartan 100 MG tablet  Commonly known as: Cozaar   100 mg, Oral, Daily      metFORMIN 1000 MG tablet  Commonly known as: GLUCOPHAGE   TAKE ONE TABLET BY MOUTH TWICE DAILY with morning AND evening meals      naproxen 500 MG tablet  Commonly known as: NAPROSYN   500 mg, Oral, 2 Times Daily With Meals      nitroglycerin 0.4 MG SL tablet  Commonly known as: NITROSTAT   0.4 mg, Sublingual, Every 5 Minutes PRN, Take no more than 3 doses in 15 minutes.      OneTouch Ultra test strip  Generic drug: glucose blood   USE TO TEST BLOOD SUGAR TWICE DAILY      oxyCODONE-acetaminophen 5-325 MG per tablet  Commonly known as: PERCOCET   1 tablet, Every 6 Hours PRN             ASK your doctor about these medications        Instructions Start Date   oxyCODONE 5 MG immediate release tablet  Commonly known as: ROXICODONE   5 mg, Oral, 2 Times Daily             Follow-up Appointments:     Follow-up Information       Provider, No Known .    Contact information:  [facility] [address] [phone]                           Test Results Pending at Discharge:    None.       Ravi Queen MD  03/26/24  17:48 EDT    Time: I spent 25 minutes on this discharge activity which included: face-to-face encounter with the patient, reviewing the data in the system, coordination of the care with the nursing staff as well as consultants, documentation, and entering  orders.     Dictated utilizing Dragon dictation.

## 2024-03-26 NOTE — PHARMACY RECOMMENDATION
HEPARIN INFUSION  Hector Nugent is a  53 y.o. female receiving heparin infusion.     Therapy for (VTE/Cardiac):   Cardiac  Patient Weight: 90.2 kg  Initial Bolus (Y/N):   N  Any Bolus (Y/N):   Y        Signs or Symptoms of Bleeding: No    Cardiac or Other (Not VTE)   Initial Bolus: 60 units/kg (Max 4,000 units)  Initial rate: 12 units/kg/hr (Max 1,000 units/hr)   Anti Xa Rebolus Infusion Hold time Change infusion Dose (Units/kg/hr) Next Anti Xa or aPTT Level Due   < 0.1 50 Units/kg  (4000 Units Max) None Increase by  3 Units/kg/hr 6 hours   0.1- 0.19 25 Units/kg  (2000 Units Max) None Increase by  2 Units/kg/hr 6 hours   0.2 - 0.29 0 None Increase by  1 Units/kg/hr 6 hours   0.3 - 0.5 0 None No Change 6 hours (after 2 consecutive levels in range check qAM)   0.51 - 0.6 0 None Decrease by  1 Units/kg/hr 6 hours   0.61 - 0.8 0 30 Minutes Decrease by  2 Units/kg/hr 6 hours   0.81 - 1 0 60 Minutes Decrease by  3 Units/kg/hr 6 hours   >1 0 Hold  After Anti Xa less than 0.5 decrease previous rate by  4 Units/kg/hr  Every 2 hours until Anti Xa  less than 0.5 then when infusion restarts in 6 hours         Recommend anti-Xa every 6 hours.     Results from last 7 days   Lab Units 03/26/24  0428 03/26/24  0303 03/25/24  2343   INR   --   --  1.00   HEMOGLOBIN g/dL 15.5 15.8 15.5   HEMATOCRIT % 46.1 46.7* 45.3   PLATELETS 10*3/mm3 242 242 309          Date   Time   Anti-Xa Current Rate (Unit/kg/hr) Bolus   (Units) Rate Change   (Unit/kg/hr) New Rate (Unit/kg/hr) Next   Anti-Xa Comments  Pump Check Daily   3/26 03:03 0.1 0 0 +11.18 11.18 1000 D/W BETHANY Carreno   3/26 1115 0.1 11.18 4000 +2.82 14 1800 D/W BETHANY Cruz     Pharmacy will continue to follow anti-Xa results and monitor for signs and symptoms of bleeding or thrombosis.    Sujey Stone, PharmD  03/26/24 11:15 EDT
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
## 2024-03-26 NOTE — ED NOTES
BS en route 455 has not taken diabetic meds today. Neg for ST elevation en route EMS. Smokes 2 PPD.

## 2024-03-26 NOTE — CASE MANAGEMENT/SOCIAL WORK
Discharge Planning Assessment   [place]     Patient Name: Hector Nugent  MRN: [id number]  Today's Date: 3/26/2024    Admit Date: 3/25/2024    Plan: Met with pt for dcp, pt provided demographics. She uses [facility], couln't recall provider name, and uses [facility] pharmacy, agreeable to meds to bed enrollment. She does not have an AD, POA. Financial stressors present, has used [employer] for utility bill assistance and [employer], qual[place] food bag at [place]. Sdoh completed. Pt is independent and drives. She [place] at [place] with her daughter providing transportation.   Discharge Needs Assessment       Row Name 03/26/24 1130       Living Environment    People in Home child(jagdish), adult    Name(s) of People in Home tyree nugent    Current Living Arrangements home    Duration at Residence 3 yrs    Potentially Unsafe Housing Conditions none    In the past 12 months has the electric, gas, oil, or water company threatened to shut off services in your home? No    Primary Care Provided by self    Family Caregiver if Needed child(jagdish), adult    Quality of Family Relationships involved;helpful    Able to Return to Prior Arrangements yes       Resource/Environmental Concerns    Resource/Environmental Concerns none    Transportation Concerns none       Transportation Needs    In the past 12 months, has lack of transportation kept you from medical appointments or from getting medications? no    In the past 12 months, has lack of transportation kept you from meetings, work, or from getting things needed for daily living? No       Food Insecurity    Within the past 12 months, you worried that your food would run out before you got the money to buy more. Often true    Within the past 12 months, the food you bought just didn't last and you didn't have money to get more. Sometimes       Transition Planning    Patient/Family Anticipates Transition to home       Discharge Needs Assessment    Readmission Within the Last  30 Days no previous admission in last 30 days    Concerns to be Addressed discharge planning    Anticipated Changes Related to Illness none    Equipment Needed After Discharge none                   Discharge Plan       Row Name 03/26/24 1133       Plan    Plan Met with pt for dcp, pt provided demographics. She uses [facility], couln't recall provider name, and uses [facility] pharmacy, agreeable to meds to bed enrollment. She does not have an AD, POA. Financial stressors present, has used [employer] for utility bill assistance and [employer], qualifies for food bag at [place]. Sdoh completed. Pt is independent and drives. She plans to [place] home at [place] with her daughter providing transportation.                  Continued Care and Services - Admitted Since 3/25/2024    No active coordination exists for this encounter.          Demographic Summary       Row Name 03/26/24 1129       General Information    Admission Type inpatient    Arrived From emergency department    Required Notices Provided Important Message from Medicare    Referral Source admission list    Reason for Consult discharge planning    Preferred Language English       Contact Information    Permission Granted to Share Info With family/designee                   Functional Status       Row Name 03/26/24 1129       Functional Status    Usual Activity Tolerance fair    Current Activity Tolerance fair       Physical Activity    On average, how many days per week do you engage in moderate to strenuous exercise (like a brisk walk)? 0 days    On average, how many minutes do you engage in exercise at this level? 0 min    Number of minutes of exercise per week 0       Functional Status, IADL    Medications independent    Meal Preparation independent    Housekeeping independent    Laundry independent    Shopping independent       Mental Status    General Appearance WDL WDL       Mental Status Summary    Recent Changes in Mental Status/Cognitive Functioning no  changes       Employment/    Employment Status disabled                   Psychosocial    No documentation.                  Abuse/Neglect    No documentation.                  Legal    No documentation.                  Substance Abuse    No documentation.                  Patient Forms    No documentation.                     Sherie Lewis RN

## 2024-03-27 NOTE — PAYOR COMM NOTE
"    D/C summary  UR Manager; Audra Sorto, [phone] and fax [phone]      Hector Nugent (53 y.o. Female)       Date of Birth   1970    Social Security Number       Address   [address] [place], [place] [address]    Home Phone       MRN   [id number]       Sabianism   None    Marital Status                               Admission Date   3/25/24    Admission Type   Emergency    Admitting Provider   Kerley, Brian Joseph, DO    Attending Provider       Department, Room/Bed   [facility] INTENSIVE CARE, I02/1       Discharge Date   3/26/2024    Discharge Disposition   Left Against Medical Advice    Discharge Destination                                 Attending Provider: (none)   Allergies: Codeine, Contrast Dye (Echo Or Unknown Ct/mr), Glipizide, Hydroxyzine, Sulfa Antibiotics, Topical Sulfur    Isolation: None   Infection: None   Code Status: Prior    Ht: 165.1 cm (65\")   Wt: 90.2 kg (198 lb 13.7 oz)    Admission Cmt: None   Principal Problem: NSTEMI (non-ST elevated myocardial infarction) [I21.4]                   Active Insurance as of 3/25/2024       Primary Coverage       Payor Plan Insurance Group Employer/Plan Group    ANTHEM MEDICARE REPLACEMENT ANTHEM MEDICARE ADVANTAGE [id number]       Payor Plan Address Payor Plan Phone Number Payor Plan Fax Number Effective Dates    [address] [phone]  1/1/2024 - None Entered    [facility] [id number]         Subscriber Name Subscriber Birth Date Member ID       HECTOR NUGENT 1970 [id number]                     Emergency Contacts        (Rel.) Home Phone Work Phone Mobile Phone    Odalis Nugent (Daughter) -- -- [phone]    Deana Ovalles (Sister) [phone] -- --    Melissa Ovalles (Relative) [phone] -- --                 Physician Progress Notes (last 24 hours)        Ravi Queen MD at 03/26/24 1310              [facility] HOSPITALIST    PROGRESS NOTE    Name:  Hector JOLLY " Jovanna   Age:  53 y.o.  Sex:  female  :  1970  MRN:  [id number]   Visit Number:  [id number]  Admission Date:  3/25/2024  Date Of Service:  24  Primary Care Physician:  Provider, No Known     LOS: 0 days :    Chief Complaint:      Follow-up of chest pain.    Subjective:    Ms. Nugent was seen and examined this morning.  She is currently sitting up in the bed and is comfortable at rest.  Her chest pain has improved.  She still has minimal shortness of breath.  She is currently on nitroglycerin and heparin drip.  She is currently awaiting cardiac catheterization by Dr. Mazariegos.  She does have prior history of cardiac stents and intermittent angina.  Previous physician documentation, laboratory and imaging data have been reviewed.    Hospital Course:    Hector Nugent is a 53-year-old woman with past medical history of coronary artery disease with history of MI and PCI, hypertension, hyperlipidemia, type 2 diabetes, heart failure preserved ejection fraction, tobacco use disorder, JASON noncompliant with CPAP.  Presented to [facility] ED on 3/25/2024 with concern for shortness of air and chest pain, progressively worse over the past couple of hours prior to EMS.  She was placed on CPAP by EMS.  Systolic blood pressure was over 210, and she was given supplemental nitroglycerin.       ED summary: Afebrile, tachycardic, tachypneic which improved, hypertensive which improved, vital signs stable on CPAP.  EKG sinus tachycardia rate 117, nonspecific ST-T wave changes.  High-sensitivity troponin 186, 295, ACS suspected.  proBNP over 1200.  Blood glucose 423.  Leukocytosis 17.  COVID-negative.  CT angio chest no PE, but did show findings suggestive of CHF with cardiomegaly.  She was provided Benadryl due to history of allergic reaction with CT contrast, 40 mg IV Lasix, Zofran, nitro drip, heparin IV.    Patient was admitted to the medical ICU and was continued on heparin and nitroglycerin drips.  She was seen by Dr. Mazariegos  from cardiology who took her down for cardiac catheterization on 3/26/2024 and the patient was noted to have severe multivessel disease.  Dr. Mazariegos discussed the patient's condition with Dr. Senior at [facility] and the patient was accepted to  for surgical coronary revascularization.    Review of Systems:     All systems were reviewed and negative except as mentioned in subjective, assessment and plan.    Vital Signs:    Temp:  [97.7 °F (36.5 °C)-98.7 °F (37.1 °C)] 97.7 °F (36.5 °C)  Heart Rate:  [] 67  Resp:  [19-40] 19  BP: ()/() 101/59    Intake and output:    I/O last 3 completed shifts:  In: -   Out: 1425 [Urine:1425]  I/O this shift:  In: -   Out: 750 [Urine:750]    Physical Examination:    General Appearance:  Alert and cooperative.   Head:  Atraumatic and normocephalic.   Eyes: Conjunctivae and sclerae normal, no icterus. No pallor.   Throat: No oral lesions, no thrush, oral mucosa moist.   Neck: Supple, trachea midline, no thyromegaly.   Lungs:   Breath sounds heard bilaterally equally.  No wheezing or crackles. No Pleural rub or bronchial breathing.   Heart:  Normal S1 and S2, no murmur, no gallop, no rub. No JVD.   Abdomen:   Normal bowel sounds, no masses, no organomegaly. Soft, nontender, nondistended, no rebound tenderness.   Extremities: Supple, no edema, no cyanosis, no clubbing.   Skin: No bleeding or rash.   Neurologic: Alert and oriented x 3. No facial asymmetry. Moves all four limbs. No tremors.      Laboratory results:    Results from last 7 days   Lab Units 03/26/24  0428 03/25/24  2343   SODIUM mmol/L 138 135*   POTASSIUM mmol/L 4.3 3.4*   CHLORIDE mmol/L 98 97*   CO2 mmol/L 24.1 24.0   BUN mg/dL 12 10   CREATININE mg/dL 0.67 0.73   CALCIUM mg/dL 9.1 8.8   BILIRUBIN mg/dL  --  0.5   ALK PHOS U/L  --  105   ALT (SGPT) U/L  --  44*   AST (SGOT) U/L  --  49*   GLUCOSE mg/dL 364* 423*     Results from last 7 days   Lab Units 03/26/24  0428 03/26/24  7764  03/25/24  2343   WBC 10*3/mm3 13.71* 14.91* 17.32*   HEMOGLOBIN g/dL 15.5 15.8 15.5   HEMATOCRIT % 46.1 46.7* 45.3   PLATELETS 10*3/mm3 242 242 309     Results from last 7 days   Lab Units 03/25/24  2343   INR  1.00     Results from last 7 days   Lab Units 03/26/24  0200 03/25/24  2343   HSTROP T ng/L 285* 186*     I have reviewed the patient's laboratory results.    Radiology results:    Cardiac Catheterization/Vascular Study    Result Date: 3/26/2024  Severe three-vessel coronary artery disease     Adult Transthoracic Echo Complete W/ Cont if Necessary Per Protocol    Result Date: 3/26/2024    Left ventricular systolic function is moderately decreased. Estimated left ventricular EF = 38%   The left ventricular cavity is mild to moderately dilated.   Left ventricular diastolic function is consistent with (grade II w/high LAP) pseudonormalization.     XR Chest 1 View    Result Date: 3/26/2024  PROCEDURE: XR CHEST 1 VW-    HISTORY: SOA Triage Protocol  COMPARISON: April 2021.  FINDINGS: The heart is at the upper limits of normal in size. The mediastinum is unremarkable. There is new bibasilar atelectasis or infiltrate. There is new bronchial wall thickening. There is no pneumothorax. There are no acute osseous abnormalities.      Impression: New bibasilar airspace disease is suggestive of pneumonia. Continued follow-up is recommended.  New bronchial wall thickening.        Images were reviewed, interpreted, and dictated by Dr. Katrina Burrell MD Transcribed by Sol Fang PA-C.  This report was signed and finalized on 3/26/2024 10:08 AM by Katrina Burrell MD.      CT Angiogram Chest Pulmonary Embolism    Result Date: 3/26/2024  FINAL REPORT TECHNIQUE: null CLINICAL HISTORY: Hypoxia, tachycardia COMPARISON: null FINDINGS: CT angiography chest with contrast. 3D Postprocessing. Comparison: None Findings: The pulmonary arteries are well opacified. Scattered plaque is seen in the thoracic aorta without aneurysm or  dissection. The heart is borderline enlarged. There is no pericardial effusion. There are no enlarged mediastinal or hilar lymph nodes. The thyroid is unremarkable. Interlobular septal thickening in the lower lungs. Nonspecific hazy ground-glass veiling in both lungs. Trace bilateral pleural effusions with dependent subsegmental atelectasis in the right middle lung and lung bases. There are no worrisome pulmonary masses or nodules. Visualized upper abdomen is unremarkable. No acute fractures.     Impression: IMPRESSION: 1. No pulmonary emboli. 2. Constellation of findings suggesting mild CHF with borderline cardiomegaly, trace bilateral pleural effusions, and lower lobe interlobular septal thickening in the setting of gravity-dependent interstitial edema. 3. Nonspecific ground-glass veiling in both lungs may reflect pulmonary edema but can be followed to exclude pneumonitis. Authenticated and Electronically Signed by Yannick Cheng MD on 03/26/2024 02:25:26 AM   I have reviewed the patient's radiology reports.    Medication Review:     I have reviewed the patient's active and prn medications.     Problem List:      NSTEMI (non-ST elevated myocardial infarction)    Assessment:    Acute non-ST elevation myocardial infarction, POA.  Acute on chronic systolic and diastolic heart failure, POA.  Acute hypoxic respiratory failure secondary to #2, POA.  Coronary artery disease status post previous PCI and stents.  Hypertensive emergency, POA.  Chronic tobacco dependence.  Obstructive sleep apnea-noncompliant with CPAP.  Diabetes mellitus type 2 with hyperglycemia, POA.    Plan:    Non-STEMI/CAD.  - Continue aspirin, Plavix, carvedilol, Imdur, atorvastatin.  - Patient was seen by Dr. Mazariegos and I have discussed the patient's treatment plan with him.  - Patient is currently awaiting transfer to [facility] for CABG.  - Due to allergy to IV contrast, she was placed on short course of prednisone taper.    Acute on chronic  heart failure.  - 2D echocardiogram does show left ventricular ejection fraction of 38% with grade 2 diastolic dysfunction.  - She has improved with regards to her pulmonary edema by use of Lasix and nitroglycerin.  - I will start her on Entresto.  - Continue Lasix 20 mg daily for 2 more days.    Essential hypertension.  - Continue amlodipine, carvedilol, Imdur.    Diabetes mellitus type 2.  - Continue subcutaneous insulin protocol as per Glucomander.  - Will obtain hemoglobin A1c levels.    Discussed with nursing staff at the bedside.    DVT Prophylaxis: Enoxaparin  Code Status: Full  Diet: Cardiac diabetic  Discharge Plan: Awaiting transfer to  for CABG.    Ravi Queen MD  24  13:10 EDT    Dictated utilizing Dragon dictation.      Electronically signed by Ravi Queen MD at 24 1321          Discharge Summary        Ravi Queen MD at 24 1369              [facility]   DISCHARGE SUMMARY      Name:  Hector Nugent   Age:  53 y.o.  Sex:  female  :  1970  MRN:  [id number]   Visit Number:  [id number]    Admission Date:  3/25/2024  Date of Discharge:  3/26/2024  Primary Care Physician:  Provider, No Known    Important issues to note:    Patient signed out AGAINST MEDICAL ADVICE.    Discharge Diagnoses:     Acute non-ST elevation myocardial infarction, POA.  Acute on chronic systolic and diastolic heart failure, POA.  Acute hypoxic respiratory failure secondary to #2, POA.  Coronary artery disease status post previous PCI and stents.  Hypertensive emergency, POA.  Chronic tobacco dependence.  Obstructive sleep apnea-noncompliant with CPAP.  Diabetes mellitus type 2 with hyperglycemia, POA.    Problem List:     Active Hospital Problems    Diagnosis  POA    **NSTEMI (non-ST elevated myocardial infarction) [I21.4]  Yes      Resolved Hospital Problems   No resolved problems to display.     Presenting Problem:    Chief Complaint   Patient presents with    Shortness of  Breath      Consults:     Consulting Physician(s)         Provider   Role Specialty     Mark Mazariegos MD      Consulting Physician Cardiology          Procedures Performed:    Diagnostic coronary angiography on 3/26/2024.    History of presenting illness/Hospital Course:    Hector Nugent is a 53-year-old woman with past medical history of coronary artery disease with history of MI and PCI, hypertension, hyperlipidemia, type 2 diabetes, heart failure preserved ejection fraction, tobacco use disorder, JASON noncompliant with CPAP.  Presented to [facility] ED on 3/25/2024 with concern for shortness of air and chest pain, progressively worse over the past couple of hours prior to EMS.  She was placed on CPAP by EMS.  Systolic blood pressure was over 210, and she was given supplemental nitroglycerin.       ED summary: Afebrile, tachycardic, tachypneic which improved, hypertensive which improved, vital signs stable on CPAP.  EKG sinus tachycardia rate 117, nonspecific ST-T wave changes.  High-sensitivity troponin 186, 295, ACS suspected.  proBNP over 1200.  Blood glucose 423.  Leukocytosis 17.  COVID-negative.  CT angio chest no PE, but did show findings suggestive of CHF with cardiomegaly.  She was provided Benadryl due to history of allergic reaction with CT contrast, 40 mg IV Lasix, Zofran, nitro drip, heparin IV.     Patient was admitted to the medical ICU and was continued on heparin and nitroglycerin drips.  She was seen by Dr. Mazariegos from cardiology who took her down for cardiac catheterization on 3/26/2024 and the patient was noted to have severe multivessel disease.  Dr. Mazariegos discussed the patient's condition with Dr. Senior at [facility] and the patient was accepted to  for surgical coronary revascularization.    Unfortunately, patient became agitated that she wanted to smoke.  She was provided nicotine patch and Neurontin for her neuropathy but still was adamant to sign out AGAINST MEDICAL ADVICE.   She was counseled at the bedside by me that this can cause other myocardial infarction, sudden cardiac death.  She understands the risks and wants to sign out AGAINST MEDICAL ADVICE.  I discussed this in front of her 2 daughters who are at the bedside.    Vital Signs:    Temp:  [97.7 °F (36.5 °C)-98.7 °F (37.1 °C)] 97.7 °F (36.5 °C)  Heart Rate:  [] 71  Resp:  [9-40] 9  BP: ()/() 96/63    Physical Exam:    General Appearance:  Alert and cooperative.    Head:  Atraumatic and normocephalic.   Eyes: Conjunctivae and sclerae normal, no icterus. No pallor.   Ears:  Ears with no abnormalities noted.   Throat: No oral lesions, no thrush, oral mucosa moist.   Neck: Supple, trachea midline, no thyromegaly.   Back:   No kyphoscoliosis present. No tenderness to palpation.   Lungs:   Breath sounds heard bilaterally equally.  No crackles or wheezing. No Pleural rub or bronchial breathing.   Heart:  Normal S1 and S2, no murmur, no gallop, no rub. No JVD.   Abdomen:   Normal bowel sounds, no masses, no organomegaly. Soft, nontender, nondistended, no rebound tenderness.   Extremities: Supple, no edema, no cyanosis, no clubbing.   Pulses: Pulses palpable bilaterally.   Skin: No bleeding or rash.   Neurologic: Alert and oriented x 3. No facial asymmetry. Moves all four limbs. No tremors.     Pertinent Lab Results:     Results from last 7 days   Lab Units 03/26/24  0428 03/25/24  2343   SODIUM mmol/L 138 135*   POTASSIUM mmol/L 4.3 3.4*   CHLORIDE mmol/L 98 97*   CO2 mmol/L 24.1 24.0   BUN mg/dL 12 10   CREATININE mg/dL 0.67 0.73   CALCIUM mg/dL 9.1 8.8   BILIRUBIN mg/dL  --  0.5   ALK PHOS U/L  --  105   ALT (SGPT) U/L  --  44*   AST (SGOT) U/L  --  49*   GLUCOSE mg/dL 364* 423*     Results from last 7 days   Lab Units 03/26/24  0428 03/26/24  0303 03/25/24  2343   WBC 10*3/mm3 13.71* 14.91* 17.32*   HEMOGLOBIN g/dL 15.5 15.8 15.5   HEMATOCRIT % 46.1 46.7* 45.3   PLATELETS 10*3/mm3 242 242 309     Results from last  7 days   Lab Units 03/25/24  2343   INR  1.00     Results from last 7 days   Lab Units 03/26/24  0200 03/25/24  2343   HSTROP T ng/L 285* 186*     Results from last 7 days   Lab Units 03/25/24  2343   PROBNP pg/mL 1,250.0*     Pertinent Radiology Results:    Imaging Results (All)       Procedure Component Value Units Date/Time    XR Chest 1 View [[id number]] Collected: 03/26/24 0916     Updated: 03/26/24 1010    Narrative:      PROCEDURE: XR CHEST 1 VW-        HISTORY: SOA Triage Protocol     COMPARISON: April 2021.     FINDINGS: The heart is at the upper limits of normal in size. The  mediastinum is unremarkable. There is new bibasilar atelectasis or  infiltrate. There is new bronchial wall thickening. There is no  pneumothorax. There are no acute osseous abnormalities.       Impression:      New bibasilar airspace disease is suggestive of pneumonia.  Continued follow-up is recommended.     New bronchial wall thickening.        Images were reviewed, interpreted, and dictated by Dr. Katrina Burrell MD  Transcribed by Sol Fang PA-C.     This report was signed and finalized on 3/26/2024 10:08 AM by Katrina Burrell MD.       CT Angiogram Chest Pulmonary Embolism [[id number]] Collected: 03/26/24 0225     Updated: 03/26/24 0226    Narrative:      FINAL REPORT    TECHNIQUE:  null    CLINICAL HISTORY:  Hypoxia, tachycardia    COMPARISON:  null    FINDINGS:  CT angiography chest with contrast. 3D Postprocessing.    Comparison: None    Findings:    The pulmonary arteries are well opacified.    Scattered plaque is seen in the thoracic aorta without aneurysm or dissection.    The heart is borderline enlarged. There is no pericardial effusion.    There are no enlarged mediastinal or hilar lymph nodes.    The thyroid is unremarkable.    Interlobular septal thickening in the lower lungs.    Nonspecific hazy ground-glass veiling in both lungs.    Trace bilateral pleural effusions with dependent subsegmental atelectasis in the  right middle lung and lung bases.    There are no worrisome pulmonary masses or nodules.    Visualized upper abdomen is unremarkable.    No acute fractures.      Impression:      IMPRESSION:    1. No pulmonary emboli.    2. Constellation of findings suggesting mild CHF with borderline cardiomegaly, trace bilateral pleural effusions, and lower lobe interlobular septal thickening in the setting of gravity-dependent interstitial edema.    3. Nonspecific ground-glass veiling in both lungs may reflect pulmonary edema but can be followed to exclude pneumonitis.    Authenticated and Electronically Signed by Yannick Cheng MD on  03/26/2024 02:25:26 AM     Echo:    Results for orders placed during the hospital encounter of 03/25/24    Adult Transthoracic Echo Complete W/ Cont if Necessary Per Protocol    Interpretation Summary    Left ventricular systolic function is moderately decreased. Estimated left ventricular EF = 38%    The left ventricular cavity is mild to moderately dilated.    Left ventricular diastolic function is consistent with (grade II w/high LAP) pseudonormalization.    Condition on Discharge:      Patient signed out AGAINST MEDICAL ADVICE.    Code status during the hospital stay:    Code Status and Medical Interventions:   Ordered at: 03/26/24 0355     Code Status (Patient has no pulse and is not breathing):    CPR (Attempt to Resuscitate)     Medical Interventions (Patient has pulse or is breathing):    Full Support     Discharge Disposition:    Left Against Medical Advice    Discharge Medications:       Discharge Medications        Continue These Medications        Instructions Start Date   Allergy Relief 10 MG tablet  Generic drug: loratadine   10 mg, Daily      amLODIPine 10 MG tablet  Commonly known as: NORVASC   10 mg, Oral, Daily      aspirin 81 MG chewable tablet   81 mg, Oral, Daily, DNS PER DR. ROWLEY      atorvastatin 80 MG tablet  Commonly known as: LIPITOR   80 mg, Oral, Daily      B-D  ULTRAFINE III SHORT PEN 31G X 8 MM misc  Generic drug: Insulin Pen Needle   2 Times Daily      carvedilol 25 MG tablet  Commonly known as: COREG   25 mg, Oral, 2 Times Daily      clopidogrel 75 MG tablet  Commonly known as: PLAVIX   75 mg, Oral, Daily      cyclobenzaprine 10 MG tablet  Commonly known as: FLEXERIL   1 tablet, 3 Times Daily      cyclobenzaprine 10 MG tablet  Commonly known as: FLEXERIL   10 mg, Oral, 3 Times Daily PRN      EPINEPHrine 0.3 MG/0.3ML solution auto-injector injection  Commonly known as: EPIPEN       estradiol 0.5 MG tablet  Commonly known as: ESTRACE   0.5 mg, Oral, Daily      gabapentin 600 MG tablet  Commonly known as: NEURONTIN   Take 1 tablet by mouth 3 (Three) Times a Day.      gemfibrozil 600 MG tablet  Commonly known as: LOPID   TAKE ONE TABLET BY MOUTH TWICE DAILY 30 minutes BEFORE morning AND evening meal      Invokana 300 MG tablet tablet  Generic drug: Canagliflozin   TAKE ONE TABLET BY MOUTH EVERY DAY BEFORE first meal of DAY      isosorbide mononitrate 30 MG 24 hr tablet  Commonly known as: IMDUR   30 mg, Oral, Every Morning      Januvia 100 MG tablet  Generic drug: SITagliptin   1 tablet, Oral, Daily      Lantus SoloStar 100 UNIT/ML injection pen  Generic drug: Insulin Glargine   inject 30 units by subcutaneous route DAILY      lidocaine 5 %  Commonly known as: LIDODERM   1 patch, Transdermal, Every 24 Hours, Remove & Discard patch within 12 hours or as directed by MD      losartan 100 MG tablet  Commonly known as: Cozaar   100 mg, Oral, Daily      metFORMIN 1000 MG tablet  Commonly known as: GLUCOPHAGE   TAKE ONE TABLET BY MOUTH TWICE DAILY with morning AND evening meals      naproxen 500 MG tablet  Commonly known as: NAPROSYN   500 mg, Oral, 2 Times Daily With Meals      nitroglycerin 0.4 MG SL tablet  Commonly known as: NITROSTAT   0.4 mg, Sublingual, Every 5 Minutes PRN, Take no more than 3 doses in 15 minutes.      OneTouch Ultra test strip  Generic drug: glucose  blood   USE TO TEST BLOOD SUGAR TWICE DAILY      oxyCODONE-acetaminophen 5-325 MG per tablet  Commonly known as: PERCOCET   1 tablet, Every 6 Hours PRN             ASK your doctor about these medications        Instructions Start Date   oxyCODONE 5 MG immediate release tablet  Commonly known as: ROXICODONE   5 mg, Oral, 2 Times Daily             Follow-up Appointments:     Follow-up Information       Provider, No Known .    Contact information:  [facility] [address] [phone]                           Test Results Pending at Discharge:    None.       Ravi Queen MD  03/26/24  17:48 EDT    Time: I spent 25 minutes on this discharge activity which included: face-to-face encounter with the patient, reviewing the data in the system, coordination of the care with the nursing staff as well as consultants, documentation, and entering orders.     Dictated utilizing Dragon dictation.      Electronically signed by Ravi Queen MD at 03/26/24 9565

## 2024-03-28 NOTE — PAYOR COMM NOTE
"To:  Francia  From: Marline Matt RN  Phone: [phone]  Fax: [phone]  NPI: [id number]  TIN: [id number]  Member ID: [id number]  MRN: [id number]    Hector Nugent (53 y.o. Female)       Date of Birth   1970    Social Security Number       Address   [address] [place] [address]    Home Phone       MRN   [id number]       Faith   None    Marital Status                               Admission Date   3/25/24    Admission Type   Emergency    Admitting Provider   Kerley, Brian Joseph, DO    Attending Provider       Department, Room/Bed   [facility] INTENSIVE CARE, I02/1       Discharge Date   3/26/2024    Discharge Disposition   Left Against Medical Advice    Discharge Destination                                 Attending Provider: (none)   Allergies: Codeine, Contrast Dye (Echo Or Unknown Ct/mr), Glipizide, Hydroxyzine, Sulfa Antibiotics, Topical Sulfur    Isolation: None   Infection: None   Code Status: Prior    Ht: 165.1 cm (65\")   Wt: 90.2 kg (198 lb 13.7 oz)    Admission Cmt: None   Principal Problem: NSTEMI (non-ST elevated myocardial infarction) [I21.4]                   Active Insurance as of 3/25/2024       Primary Coverage       Payor Plan Insurance Group Employer/Plan Group    Formerly [facility], 1928–1983 MEDICARE REPLACEMENT ANTH MEDICARE ADVANTAGE [id number]       Payor Plan Address Payor Plan Phone Number Payor Plan Fax Number Effective Dates    [address] [phone]  1/1/2024 - None Entered    [facility] [id number]         Subscriber Name Subscriber Birth Date Member ID       HECTOR NUGENT 1970 [id number]                     Emergency Contacts        (Rel.) Home Phone Work Phone Mobile Phone    Odalis Nugent (Daughter) -- -- [phone]    Deana Ovalles (Sister) [phone] -- --    Melissa Ovalles (Relative) [phone] -- --                 Discharge Summary        Ravi Queen MD at 03/26/24 [facility]8              [facility] " HOSPITALIST   DISCHARGE SUMMARY      Name:  Hector Nugent   Age:  53 y.o.  Sex:  female  :  1970  MRN:  [id number]   Visit Number:  [id number]    Admission Date:  3/25/2024  Date of Discharge:  3/26/2024  Primary Care Physician:  Provider, No Known    Important issues to note:    Patient signed out AGAINST MEDICAL ADVICE.    Discharge Diagnoses:     Acute type 2 N-STEMI due to demand ischemia from chronic CAD, POA  Acute on chronic systolic and diastolic heart failure, POA.  Acute hypoxic respiratory failure secondary to #2, POA.  Coronary artery disease status post previous PCI and stents.  Hypertensive emergency, POA.  Chronic tobacco dependence.  Obstructive sleep apnea-noncompliant with CPAP.  Diabetes mellitus type 2 with hyperglycemia, POA.    Problem List:     Active Hospital Problems    Diagnosis  POA    **NSTEMI (non-ST elevated myocardial infarction) [I21.4]  Yes      Resolved Hospital Problems   No resolved problems to display.     Presenting Problem:    Chief Complaint   Patient presents with    Shortness of Breath      Consults:     Consulting Physician(s)         Provider   Role Specialty     Breeding, Mark CAMARGO MD      Consulting Physician Cardiology          Procedures Performed:    Diagnostic coronary angiography on 3/26/2024.    History of presenting illness/Hospital Course:    Hector Nugent is a 53-year-old woman with past medical history of coronary artery disease with history of MI and PCI, hypertension, hyperlipidemia, type 2 diabetes, heart failure preserved ejection fraction, tobacco use disorder, JASON noncompliant with CPAP.  Presented to [facility] ED on 3/25/2024 with concern for shortness of air and chest pain, progressively worse over the past couple of hours prior to EMS.  She was placed on CPAP by EMS.  Systolic blood pressure was over 210, and she was given supplemental nitroglycerin.       ED summary: Afebrile, tachycardic, tachypneic which improved, hypertensive which improved,  vital signs stable on CPAP.  EKG sinus tachycardia rate 117, nonspecific ST-T wave changes.  High-sensitivity troponin 186, 295, ACS suspected.  proBNP over 1200.  Blood glucose 423.  Leukocytosis 17.  COVID-negative.  CT angio chest no PE, but did show findings suggestive of CHF with cardiomegaly.  She was provided Benadryl due to history of allergic reaction with CT contrast, 40 mg IV Lasix, Zofran, nitro drip, heparin IV.     Patient was admitted to the medical ICU and was continued on heparin and nitroglycerin drips.  She was seen by Dr. Mazariegos from cardiology who took her down for cardiac catheterization on 3/26/2024 and the patient was noted to have severe multivessel disease.  Dr. Mazariegos discussed the patient's condition with Dr. Senior at [facility] and the patient was accepted to  for surgical coronary revascularization.    Unfortunately, patient became agitated that she wanted to smoke.  She was provided nicotine patch and Neurontin for her neuropathy but still was adamant to sign out AGAINST MEDICAL ADVICE.  She was counseled at the bedside by me that this can cause other myocardial infarction, sudden cardiac death.  She understands the risks and wants to sign out AGAINST MEDICAL ADVICE.  I discussed this in front of her 2 daughters who are at the bedside.    Vital Signs:    Temp:  [97.7 °F (36.5 °C)-98.7 °F (37.1 °C)] 97.7 °F (36.5 °C)  Heart Rate:  [] 71  Resp:  [9-40] 9  BP: ()/() 96/63    Physical Exam:    General Appearance:  Alert and cooperative.    Head:  Atraumatic and normocephalic.   Eyes: Conjunctivae and sclerae normal, no icterus. No pallor.   Ears:  Ears with no abnormalities noted.   Throat: No oral lesions, no thrush, oral mucosa moist.   Neck: Supple, trachea midline, no thyromegaly.   Back:   No kyphoscoliosis present. No tenderness to palpation.   Lungs:   Breath sounds heard bilaterally equally.  No crackles or wheezing. No Pleural rub or bronchial  breathing.   Heart:  Normal S1 and S2, no murmur, no gallop, no rub. No JVD.   Abdomen:   Normal bowel sounds, no masses, no organomegaly. Soft, nontender, nondistended, no rebound tenderness.   Extremities: Supple, no edema, no cyanosis, no clubbing.   Pulses: Pulses palpable bilaterally.   Skin: No bleeding or rash.   Neurologic: Alert and oriented x 3. No facial asymmetry. Moves all four limbs. No tremors.     Pertinent Lab Results:     Results from last 7 days   Lab Units 03/26/24  0428 03/25/24  2343   SODIUM mmol/L 138 135*   POTASSIUM mmol/L 4.3 3.4*   CHLORIDE mmol/L 98 97*   CO2 mmol/L 24.1 24.0   BUN mg/dL 12 10   CREATININE mg/dL 0.67 0.73   CALCIUM mg/dL 9.1 8.8   BILIRUBIN mg/dL  --  0.5   ALK PHOS U/L  --  105   ALT (SGPT) U/L  --  44*   AST (SGOT) U/L  --  49*   GLUCOSE mg/dL 364* 423*     Results from last 7 days   Lab Units 03/26/24  0428 03/26/24  0303 03/25/24  2343   WBC 10*3/mm3 13.71* 14.91* 17.32*   HEMOGLOBIN g/dL 15.5 15.8 15.5   HEMATOCRIT % 46.1 46.7* 45.3   PLATELETS 10*3/mm3 242 242 309     Results from last 7 days   Lab Units 03/25/24  2343   INR  1.00     Results from last 7 days   Lab Units 03/26/24  0200 03/25/24  2343   HSTROP T ng/L 285* 186*     Results from last 7 days   Lab Units 03/25/24  2343   PROBNP pg/mL 1,250.0*     Pertinent Radiology Results:    Imaging Results (All)       Procedure Component Value Units Date/Time    XR Chest 1 View [[id number]] Collected: 03/26/24 0916     Updated: 03/26/24 1010    Narrative:      PROCEDURE: XR CHEST 1 VW-        HISTORY: SOA Triage Protocol     COMPARISON: April 2021.     FINDINGS: The heart is at the upper limits of normal in size. The  mediastinum is unremarkable. There is new bibasilar atelectasis or  infiltrate. There is new bronchial wall thickening. There is no  pneumothorax. There are no acute osseous abnormalities.       Impression:      New bibasilar airspace disease is suggestive of pneumonia.  Continued follow-up is  recommended.     New bronchial wall thickening.        Images were reviewed, interpreted, and dictated by Dr. Katrina Burrell MD  Transcribed by oSl Fang PA-C.     This report was signed and finalized on 3/26/2024 10:08 AM by Katrina Burrell MD.       CT Angiogram Chest Pulmonary Embolism [[id number]] Collected: 03/26/24 0225     Updated: 03/26/24 0226    Narrative:      FINAL REPORT    TECHNIQUE:  null    CLINICAL HISTORY:  Hypoxia, tachycardia    COMPARISON:  null    FINDINGS:  CT angiography chest with contrast. 3D Postprocessing.    Comparison: None    Findings:    The pulmonary arteries are well opacified.    Scattered plaque is seen in the thoracic aorta without aneurysm or dissection.    The heart is borderline enlarged. There is no pericardial effusion.    There are no enlarged mediastinal or hilar lymph nodes.    The thyroid is unremarkable.    Interlobular septal thickening in the lower lungs.    Nonspecific hazy ground-glass veiling in both lungs.    Trace bilateral pleural effusions with dependent subsegmental atelectasis in the right middle lung and lung bases.    There are no worrisome pulmonary masses or nodules.    Visualized upper abdomen is unremarkable.    No acute fractures.      Impression:      IMPRESSION:    1. No pulmonary emboli.    2. Constellation of findings suggesting mild CHF with borderline cardiomegaly, trace bilateral pleural effusions, and lower lobe interlobular septal thickening in the setting of gravity-dependent interstitial edema.    3. Nonspecific ground-glass veiling in both lungs may reflect pulmonary edema but can be followed to exclude pneumonitis.    Authenticated and Electronically Signed by Yannick Cheng MD on  03/26/2024 02:25:26 AM     Echo:    Results for orders placed during the hospital encounter of 03/25/24    Adult Transthoracic Echo Complete W/ Cont if Necessary Per Protocol    Interpretation Summary    Left ventricular systolic function is moderately  decreased. Estimated left ventricular EF = 38%    The left ventricular cavity is mild to moderately dilated.    Left ventricular diastolic function is consistent with (grade II w/high LAP) pseudonormalization.    Condition on Discharge:      Patient signed out AGAINST MEDICAL ADVICE.    Code status during the hospital stay:    Code Status and Medical Interventions:   Ordered at: 03/26/24 0355     Code Status (Patient has no pulse and is not breathing):    CPR (Attempt to Resuscitate)     Medical Interventions (Patient has pulse or is breathing):    Full Support     Discharge Disposition:    Left Against Medical Advice    Discharge Medications:       Discharge Medications        Continue These Medications        Instructions Start Date   Allergy Relief 10 MG tablet  Generic drug: loratadine   10 mg, Daily      amLODIPine 10 MG tablet  Commonly known as: NORVASC   10 mg, Oral, Daily      aspirin 81 MG chewable tablet   81 mg, Oral, Daily, DNS PER DR. ROWLEY      atorvastatin 80 MG tablet  Commonly known as: LIPITOR   80 mg, Oral, Daily      B-D ULTRAFINE III SHORT PEN 31G X 8 MM misc  Generic drug: Insulin Pen Needle   2 Times Daily      carvedilol 25 MG tablet  Commonly known as: COREG   25 mg, Oral, 2 Times Daily      clopidogrel 75 MG tablet  Commonly known as: PLAVIX   75 mg, Oral, Daily      cyclobenzaprine 10 MG tablet  Commonly known as: FLEXERIL   1 tablet, 3 Times Daily      cyclobenzaprine 10 MG tablet  Commonly known as: FLEXERIL   10 mg, Oral, 3 Times Daily PRN      EPINEPHrine 0.3 MG/0.3ML solution auto-injector injection  Commonly known as: EPIPEN       estradiol 0.5 MG tablet  Commonly known as: ESTRACE   0.5 mg, Oral, Daily      gabapentin 600 MG tablet  Commonly known as: NEURONTIN   Take 1 tablet by mouth 3 (Three) Times a Day.      gemfibrozil 600 MG tablet  Commonly known as: LOPID   TAKE ONE TABLET BY MOUTH TWICE DAILY 30 minutes BEFORE morning AND evening meal      Invokana 300 MG tablet  tablet  Generic drug: Canagliflozin   TAKE ONE TABLET BY MOUTH EVERY DAY BEFORE first meal of DAY      isosorbide mononitrate 30 MG 24 hr tablet  Commonly known as: IMDUR   30 mg, Oral, Every Morning      Januvia 100 MG tablet  Generic drug: SITagliptin   1 tablet, Oral, Daily      Lantus SoloStar 100 UNIT/ML injection pen  Generic drug: Insulin Glargine   inject 30 units by subcutaneous route DAILY      lidocaine 5 %  Commonly known as: LIDODERM   1 patch, Transdermal, Every 24 Hours, Remove & Discard patch within 12 hours or as directed by MD      losartan 100 MG tablet  Commonly known as: Cozaar   100 mg, Oral, Daily      metFORMIN 1000 MG tablet  Commonly known as: GLUCOPHAGE   TAKE ONE TABLET BY MOUTH TWICE DAILY with morning AND evening meals      naproxen 500 MG tablet  Commonly known as: NAPROSYN   500 mg, Oral, 2 Times Daily With Meals      nitroglycerin 0.4 MG SL tablet  Commonly known as: NITROSTAT   0.4 mg, Sublingual, Every 5 Minutes PRN, Take no more than 3 doses in 15 minutes.      OneTouch Ultra test strip  Generic drug: glucose blood   USE TO TEST BLOOD SUGAR TWICE DAILY      oxyCODONE-acetaminophen 5-325 MG per tablet  Commonly known as: PERCOCET   1 tablet, Every 6 Hours PRN             ASK your doctor about these medications        Instructions Start Date   oxyCODONE 5 MG immediate release tablet  Commonly known as: ROXICODONE   5 mg, Oral, 2 Times Daily             Follow-up Appointments:     Follow-up Information       Provider, No Known .    Contact information:  [facility] [address] [phone]                           Test Results Pending at Discharge:    None.       Ravi Queen MD  03/26/24  17:48 EDT    Time: I spent 25 minutes on this discharge activity which included: face-to-face encounter with the patient, reviewing the data in the system, coordination of the care with the nursing staff as well as consultants, documentation, and entering orders.     Dictated  utilizing Dragon dictation.      Electronically signed by Ravi Queen MD at 03/27/24 6108

## 2024-06-05 ENCOUNTER — TRANSCRIBE ORDERS (OUTPATIENT)
Dept: ADMINISTRATIVE | Facility: HOSPITAL | Age: 54
End: 2024-06-05
Payer: MEDICARE

## 2024-06-05 DIAGNOSIS — Z12.31 SCREENING MAMMOGRAM, ENCOUNTER FOR: Primary | ICD-10-CM

## 2024-10-14 RX ORDER — ISOSORBIDE MONONITRATE 30 MG/1
30 TABLET, EXTENDED RELEASE ORAL EVERY MORNING
Qty: 90 TABLET | Refills: 3 | OUTPATIENT
Start: 2024-10-14

## (undated) DEVICE — SUT PDS LP 1 TP1 96IN VIO PDP880GA

## (undated) DEVICE — SOL LR 1000ML

## (undated) DEVICE — DRSNG WND GZ PAD BORDERED 4X8IN STRL

## (undated) DEVICE — SUT PROLN 0 CT 30IN 8434H

## (undated) DEVICE — HARMONIC ACE +7 SHEARS ADVANCED HEMOSTASIS 5MM DIAMETER 23CM SHAFT LENGTH  FOR USE WITH GRAY HAND PIECE ONLY: Brand: HARMONIC ACE

## (undated) DEVICE — MEDI-VAC NON-CONDUCTIVE SUCTION TUBING: Brand: CARDINAL HEALTH

## (undated) DEVICE — CATH F6INF TL JR 4 100CM: Brand: INFINITI

## (undated) DEVICE — TRY SKINPREP DRYPREP

## (undated) DEVICE — MEDI-VAC YANKAUER SUCTION HANDLE W/BULBOUS TIP: Brand: CARDINAL HEALTH

## (undated) DEVICE — GLIDESHEATH SLENDER STAINLESS STEEL KIT: Brand: GLIDESHEATH SLENDER

## (undated) DEVICE — LEX BASIC NO DRAPE: Brand: MEDLINE INDUSTRIES, INC.

## (undated) DEVICE — DRSNG WND BORDR/ADHS NONADHR/GZ LF 4X4IN STRL

## (undated) DEVICE — DRSNG WND GZ PAD BORDERED LF 4X5IN STRL

## (undated) DEVICE — GLV SURG TRIUMPH MICRO PF LTX 7.5 STRL

## (undated) DEVICE — TR BAND RADIAL ARTERY COMPRESSION DEVICE: Brand: TR BAND

## (undated) DEVICE — 3M™ STERI-STRIP™ REINFORCED ADHESIVE SKIN CLOSURES, R1547, 1/2 IN X 4 IN (12 MM X 100 MM), 6 STRIPS/ENVELOPE: Brand: 3M™ STERI-STRIP™

## (undated) DEVICE — 3M™ IOBAN™ 2 ANTIMICROBIAL INCISE DRAPE 6650EZ: Brand: IOBAN™ 2

## (undated) DEVICE — SKIN AFFIX SURG ADHESIVE 72/CS 0.55ML: Brand: MEDLINE

## (undated) DEVICE — CATH F6 ST JL 4 100CM: Brand: SUPERTORQUE

## (undated) DEVICE — SUT PROLN 0 CT1 30IN 8424H

## (undated) DEVICE — CANNULA,OXY,ADULT,SUPERSOFT,W/7'TUB,UC: Brand: MEDLINE

## (undated) DEVICE — 3M™ STERI-DRAPE™ INSTRUMENT POUCH 1018: Brand: STERI-DRAPE™

## (undated) DEVICE — SUT MONOCRYL PLS ANTIB UND 3/0  PS1 27IN

## (undated) DEVICE — SYR LUERLOK 30CC

## (undated) DEVICE — DRAPE, LAVH, STERILE: Brand: MEDLINE

## (undated) DEVICE — GLV SURG DERMASSURE GRN LF PF SZ 6.5

## (undated) DEVICE — ANTIBACTERIAL UNDYED BRAIDED (POLYGLACTIN 910), SYNTHETIC ABSORBABLE SUTURE: Brand: COATED VICRYL

## (undated) DEVICE — DRAPE,UNDERBUTTOCKS,STERILE: Brand: MEDLINE

## (undated) DEVICE — AIRWY 90MM NO9

## (undated) DEVICE — RICH MAJOR PROCEDURE: Brand: MEDLINE INDUSTRIES, INC.

## (undated) DEVICE — COVER,LIGHT HANDLE,FLX,1/PK: Brand: MEDLINE INDUSTRIES, INC.

## (undated) DEVICE — SUT VIC 3/0 SH 27IN J416H

## (undated) DEVICE — STRIP,CLOSURE,WOUND,MEDI-STRIP,1/2X4: Brand: MEDLINE

## (undated) DEVICE — NDL HYPO ECLPS SFTY 22G 1 1/2IN

## (undated) DEVICE — GLV SURG SENSICARE MICRO PF LF 6 STRL

## (undated) DEVICE — GW INQWIRE FC PTFE STD J/1.5 .035 260

## (undated) DEVICE — ADAPT ST INFUS ADMIN SYR 70IN

## (undated) DEVICE — BNDR ABD PREMIUM/UNIV 10IN 27TO48IN

## (undated) DEVICE — SUT VICYL PLS CTD ANTIB BR 1 27IN VIL

## (undated) DEVICE — SPNG GZ WOVN 4X4IN 12PLY 10/BX STRL

## (undated) DEVICE — CUFF SCD HEMOFORCE SEQ CALF STD MD

## (undated) DEVICE — SUT VIC 12X27 D8116 BX/12

## (undated) DEVICE — DGW .035 FC J3MM 260CM TEF: Brand: EMERALD